# Patient Record
Sex: MALE | Race: WHITE | Employment: UNEMPLOYED | URBAN - METROPOLITAN AREA
[De-identification: names, ages, dates, MRNs, and addresses within clinical notes are randomized per-mention and may not be internally consistent; named-entity substitution may affect disease eponyms.]

---

## 2018-02-16 ENCOUNTER — HOSPITAL ENCOUNTER (OUTPATIENT)
Dept: OTHER | Age: 28
Discharge: HOME | End: 2018-02-16
Attending: INTERNAL MEDICINE | Admitting: INTERNAL MEDICINE

## 2018-02-16 LAB
ALBUMIN SERPL-MCNC: 4.3 GM/DL (ref 3.4–5)
ALP BLD-CCNC: 90 IU/L (ref 40–129)
ALT SERPL-CCNC: 19 U/L (ref 10–40)
AST SERPL-CCNC: 21 IU/L (ref 15–37)
BILIRUB SERPL-MCNC: 0.4 MG/DL (ref 0–1)
BILIRUBIN DIRECT: 0.2 MG/DL (ref 0–0.3)
BILIRUBIN, INDIRECT: 0.2 MG/DL (ref 0–0.7)
HEPATITIS B SURFACE ANTIGEN: NON REACTIVE
HEPATITIS C ANTIBODY: ABNORMAL
TOTAL PROTEIN: 6.9 GM/DL (ref 6.4–8.2)

## 2018-02-18 LAB — HIV SCREEN: NON REACTIVE

## 2018-10-20 ENCOUNTER — HOSPITAL ENCOUNTER (EMERGENCY)
Age: 28
Discharge: HOME | End: 2018-10-20
Attending: EMERGENCY MEDICINE
Payer: COMMERCIAL

## 2018-10-20 VITALS
HEIGHT: 72 IN | HEART RATE: 72 BPM | DIASTOLIC BLOOD PRESSURE: 71 MMHG | BODY MASS INDEX: 23.03 KG/M2 | WEIGHT: 170 LBS | RESPIRATION RATE: 16 BRPM | TEMPERATURE: 97.8 F | OXYGEN SATURATION: 98 % | SYSTOLIC BLOOD PRESSURE: 115 MMHG

## 2018-10-20 DIAGNOSIS — J06.9 UPPER RESPIRATORY TRACT INFECTION, UNSPECIFIED TYPE: Primary | ICD-10-CM

## 2018-10-20 PROCEDURE — 99283 EMERGENCY DEPT VISIT LOW MDM: CPT

## 2018-10-20 RX ORDER — NICOTINE 21 MG/24HR
1 PATCH, TRANSDERMAL 24 HOURS TRANSDERMAL EVERY 24 HOURS
Qty: 30 PATCH | Refills: 3 | Status: SHIPPED | OUTPATIENT
Start: 2018-10-20

## 2018-10-20 RX ORDER — ALBUTEROL SULFATE 90 UG/1
2 AEROSOL, METERED RESPIRATORY (INHALATION) EVERY 4 HOURS PRN
Qty: 1 INHALER | Refills: 1 | Status: SHIPPED | OUTPATIENT
Start: 2018-10-20 | End: 2018-11-19

## 2018-10-20 RX ORDER — GUAIFENESIN/DEXTROMETHORPHAN 100-10MG/5
5 SYRUP ORAL 4 TIMES DAILY PRN
Qty: 120 ML | Refills: 0 | Status: SHIPPED | OUTPATIENT
Start: 2018-10-20 | End: 2018-10-30

## 2018-10-20 ASSESSMENT — PAIN DESCRIPTION - DESCRIPTORS: DESCRIPTORS: SHARP

## 2018-10-20 ASSESSMENT — PAIN DESCRIPTION - ORIENTATION: ORIENTATION: ANTERIOR;UPPER

## 2018-10-20 ASSESSMENT — PAIN DESCRIPTION - FREQUENCY: FREQUENCY: INTERMITTENT

## 2018-10-20 ASSESSMENT — PAIN DESCRIPTION - ONSET: ONSET: ON-GOING

## 2018-10-20 ASSESSMENT — PAIN SCALES - GENERAL: PAINLEVEL_OUTOF10: 10

## 2018-10-20 ASSESSMENT — PAIN DESCRIPTION - LOCATION: LOCATION: NECK;CHEST

## 2018-10-20 ASSESSMENT — PAIN DESCRIPTION - PAIN TYPE: TYPE: ACUTE PAIN

## 2018-10-20 NOTE — ED PROVIDER NOTES
Triage Chief Complaint:   Cough    [place]:  Joe Mckeon is a 33 y.o. male that presents with complaint of cough, hurts when he when he coughs. Had a little bit of congestion and sore throat yesterday. 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 He does smoke, would like help to stop smoking because his girlfriend is 14 weeks pregnant. He primarily is concerned he would give this to her. He has had no sputum production. No chest pain. No bowel pain. No nausea or vomiting. Felt sweaty but not sure if he had fevers. No leg swelling. He has not had wheezing. Has never required an inhaler before. ROS:  10 systems reviewed and negative except as above. Past Medical History:   Diagnosis Date    Hypertension      Past Surgical History:   Procedure Laterality Date    ARM SURGERY Left 08/23/2016    incision & drainage     History reviewed. No pertinent family history. Social History     Social History    Marital status: Single     Spouse name: N/A    Number of children: N/A    Years of education: N/A     Occupational History    Not on file. Social History Main Topics    Smoking status: Current Every Day Smoker     Packs/day: 1.00     Types: Cigarettes    Smokeless tobacco: Never Used    Alcohol use No      Comment: rarely    Drug use: No      Comment: he sts he is currenlty sober and goes to [employer] regularly    Sexual activity: Yes     Partners: Female      Comment: has hx of cocaine use     Other Topics Concern    Not on file     Social History Narrative    No narrative on file     No current facility-administered medications for this encounter.       Current Outpatient Prescriptions   Medication Sig Dispense Refill    naltrexone (VIVITROL) 380 MG injection Inject 380 mg into the muscle once      Spacer/Aero-Holding Chambers (E-Z SPACER) PARKER 1 Device by Does not apply route daily as needed (for use

## 2019-08-14 ENCOUNTER — APPOINTMENT (OUTPATIENT)
Dept: CT IMAGING | Age: 29
DRG: 917 | End: 2019-08-14

## 2019-08-14 ENCOUNTER — APPOINTMENT (OUTPATIENT)
Dept: GENERAL RADIOLOGY | Age: 29
DRG: 917 | End: 2019-08-14

## 2019-08-14 ENCOUNTER — HOSPITAL ENCOUNTER (INPATIENT)
Age: 29
LOS: 14 days | Discharge: LEFT BEFORE DISCHARGE | DRG: 917 | End: 2019-08-28
Attending: EMERGENCY MEDICINE | Admitting: INTERNAL MEDICINE

## 2019-08-14 DIAGNOSIS — J96.01 ACUTE RESPIRATORY FAILURE WITH HYPOXIA (HCC): ICD-10-CM

## 2019-08-14 DIAGNOSIS — F19.10 POLYSUBSTANCE ABUSE (HCC): Primary | ICD-10-CM

## 2019-08-14 PROBLEM — J96.00 ACUTE RESPIRATORY FAILURE (HCC): Status: ACTIVE | Noted: 2019-08-14

## 2019-08-14 LAB
ACETAMINOPHEN LEVEL: <5 UG/ML (ref 15–30)
ALBUMIN SERPL-MCNC: 4.5 GM/DL (ref 3.4–5)
ALCOHOL SCREEN SERUM: NORMAL %WT/VOL
ALP BLD-CCNC: 94 IU/L (ref 40–129)
ALT SERPL-CCNC: 66 U/L (ref 10–40)
AMMONIA: 61 UMOL/L (ref 16–60)
AMPHETAMINES: NEGATIVE
ANION GAP SERPL CALCULATED.3IONS-SCNC: 10 MMOL/L (ref 4–16)
AST SERPL-CCNC: 49 IU/L (ref 15–37)
BACTERIA: NEGATIVE /HPF
BARBITURATE SCREEN URINE: NEGATIVE
BASE EXCESS MIXED: ABNORMAL (ref 0–1.2)
BASE EXCESS: ABNORMAL (ref 0–3.3)
BASOPHILS ABSOLUTE: 0 K/CU MM
BASOPHILS RELATIVE PERCENT: 0.2 % (ref 0–1)
BENZODIAZEPINE SCREEN, URINE: ABNORMAL
BILIRUB SERPL-MCNC: 0.5 MG/DL (ref 0–1)
BILIRUBIN URINE: NEGATIVE MG/DL
BLOOD, URINE: NEGATIVE
BUN BLDV-MCNC: 18 MG/DL (ref 6–23)
CALCIUM OXALATE CRYSTALS: ABNORMAL /HPF
CALCIUM SERPL-MCNC: 9.9 MG/DL (ref 8.3–10.6)
CANNABINOID SCREEN URINE: NEGATIVE
CARBON MONOXIDE, BLOOD: 3 % (ref 0–5)
CHLORIDE BLD-SCNC: 98 MMOL/L (ref 99–110)
CLARITY: CLEAR
CO2 CONTENT: 32.5 MMOL/L (ref 19–24)
CO2: 26 MMOL/L (ref 21–32)
COCAINE METABOLITE: ABNORMAL
COLOR: YELLOW
COMMENT: ABNORMAL
COMMENT: ABNORMAL
CREAT SERPL-MCNC: 0.8 MG/DL (ref 0.9–1.3)
DIFFERENTIAL TYPE: ABNORMAL
DOSE AMOUNT: ABNORMAL
DOSE TIME: ABNORMAL
EOSINOPHILS ABSOLUTE: 0.1 K/CU MM
EOSINOPHILS RELATIVE PERCENT: 0.6 % (ref 0–3)
GFR AFRICAN AMERICAN: >60 ML/MIN/1.73M2
GFR NON-AFRICAN AMERICAN: >60 ML/MIN/1.73M2
GLUCOSE BLD-MCNC: 100 MG/DL (ref 70–99)
GLUCOSE, URINE: NEGATIVE MG/DL
HCO3 ARTERIAL: 31 MMOL/L (ref 18–23)
HCO3 VENOUS: 26.1 MMOL/L (ref 19–25)
HCT VFR BLD CALC: 47.3 % (ref 42–52)
HEMOGLOBIN: 15.9 GM/DL (ref 13.5–18)
IMMATURE NEUTROPHIL %: 0.4 % (ref 0–0.43)
KETONES, URINE: NEGATIVE MG/DL
LACTATE: 1.4 MMOL/L (ref 0.4–2)
LEUKOCYTE ESTERASE, URINE: NEGATIVE
LYMPHOCYTES ABSOLUTE: 2.2 K/CU MM
LYMPHOCYTES RELATIVE PERCENT: 17.5 % (ref 24–44)
MCH RBC QN AUTO: 31 PG (ref 27–31)
MCHC RBC AUTO-ENTMCNC: 33.6 % (ref 32–36)
MCV RBC AUTO: 92.2 FL (ref 78–100)
METHEMOGLOBIN ARTERIAL: 1 %
MONOCYTES ABSOLUTE: 1.4 K/CU MM
MONOCYTES RELATIVE PERCENT: 10.9 % (ref 0–4)
MUCUS: ABNORMAL HPF
NITRITE URINE, QUANTITATIVE: NEGATIVE
NUCLEATED RBC %: 0 %
O2 SAT, VEN: 84.6 % (ref 50–70)
O2 SATURATION: 95.8 % (ref 96–97)
OPIATES, URINE: ABNORMAL
OXYCODONE: ABNORMAL
PCO2 ARTERIAL: 50 MMHG (ref 32–45)
PCO2, VEN: 53 MMHG (ref 38–52)
PDW BLD-RTO: 12.1 % (ref 11.7–14.9)
PH BLOOD: 7.4 (ref 7.34–7.45)
PH VENOUS: 7.3 (ref 7.32–7.42)
PH, URINE: 6 (ref 5–8)
PHENCYCLIDINE, URINE: ABNORMAL
PLATELET # BLD: 229 K/CU MM (ref 140–440)
PMV BLD AUTO: 9.9 FL (ref 7.5–11.1)
PO2 ARTERIAL: 203 MMHG (ref 75–100)
PO2, VEN: 57 MMHG (ref 28–48)
POTASSIUM SERPL-SCNC: 3.6 MMOL/L (ref 3.5–5.1)
PROTEIN UA: 30 MG/DL
RBC # BLD: 5.13 M/CU MM (ref 4.6–6.2)
RBC URINE: 4 /HPF (ref 0–3)
SEGMENTED NEUTROPHILS ABSOLUTE COUNT: 8.9 K/CU MM
SEGMENTED NEUTROPHILS RELATIVE PERCENT: 70.4 % (ref 36–66)
SODIUM BLD-SCNC: 134 MMOL/L (ref 135–145)
SPECIFIC GRAVITY UA: 1.02 (ref 1–1.03)
TOTAL IMMATURE NEUTOROPHIL: 0.05 K/CU MM
TOTAL NUCLEATED RBC: 0 K/CU MM
TOTAL PROTEIN: 7.9 GM/DL (ref 6.4–8.2)
TRANSITIONAL EPITHELIAL: <1 /HPF
TRICHOMONAS: ABNORMAL /HPF
TROPONIN T: <0.01 NG/ML
TSH HIGH SENSITIVITY: 1.48 UIU/ML (ref 0.27–4.2)
UROBILINOGEN, URINE: NORMAL MG/DL (ref 0.2–1)
WBC # BLD: 12.6 K/CU MM (ref 4–10.5)
WBC UA: ABNORMAL /HPF (ref 0–2)

## 2019-08-14 PROCEDURE — 84484 ASSAY OF TROPONIN QUANT: CPT

## 2019-08-14 PROCEDURE — 94750 HC PULMONARY COMPLIANCE STUDY: CPT

## 2019-08-14 PROCEDURE — 2500000003 HC RX 250 WO HCPCS: Performed by: EMERGENCY MEDICINE

## 2019-08-14 PROCEDURE — 81001 URINALYSIS AUTO W/SCOPE: CPT

## 2019-08-14 PROCEDURE — 6360000002 HC RX W HCPCS

## 2019-08-14 PROCEDURE — 2500000003 HC RX 250 WO HCPCS: Performed by: INTERNAL MEDICINE

## 2019-08-14 PROCEDURE — 96368 THER/DIAG CONCURRENT INF: CPT

## 2019-08-14 PROCEDURE — 80053 COMPREHEN METABOLIC PANEL: CPT

## 2019-08-14 PROCEDURE — 2580000003 HC RX 258: Performed by: EMERGENCY MEDICINE

## 2019-08-14 PROCEDURE — 96365 THER/PROPH/DIAG IV INF INIT: CPT

## 2019-08-14 PROCEDURE — 6360000002 HC RX W HCPCS: Performed by: INTERNAL MEDICINE

## 2019-08-14 PROCEDURE — 0BH18EZ INSERTION OF ENDOTRACHEAL AIRWAY INTO TRACHEA, VIA NATURAL OR ARTIFICIAL OPENING ENDOSCOPIC: ICD-10-PCS | Performed by: INTERNAL MEDICINE

## 2019-08-14 PROCEDURE — 71045 X-RAY EXAM CHEST 1 VIEW: CPT

## 2019-08-14 PROCEDURE — 6370000000 HC RX 637 (ALT 250 FOR IP): Performed by: INTERNAL MEDICINE

## 2019-08-14 PROCEDURE — 2000000000 HC ICU R&B

## 2019-08-14 PROCEDURE — 2580000003 HC RX 258: Performed by: INTERNAL MEDICINE

## 2019-08-14 PROCEDURE — 5A1955Z RESPIRATORY VENTILATION, GREATER THAN 96 CONSECUTIVE HOURS: ICD-10-PCS | Performed by: INTERNAL MEDICINE

## 2019-08-14 PROCEDURE — 96366 THER/PROPH/DIAG IV INF ADDON: CPT

## 2019-08-14 PROCEDURE — 80307 DRUG TEST PRSMV CHEM ANLYZR: CPT

## 2019-08-14 PROCEDURE — 36600 WITHDRAWAL OF ARTERIAL BLOOD: CPT

## 2019-08-14 PROCEDURE — 99291 CRITICAL CARE FIRST HOUR: CPT

## 2019-08-14 PROCEDURE — 2500000003 HC RX 250 WO HCPCS

## 2019-08-14 PROCEDURE — G0480 DRUG TEST DEF 1-7 CLASSES: HCPCS

## 2019-08-14 PROCEDURE — 99152 MOD SED SAME PHYS/QHP 5/>YRS: CPT

## 2019-08-14 PROCEDURE — 83605 ASSAY OF LACTIC ACID: CPT

## 2019-08-14 PROCEDURE — 70450 CT HEAD/BRAIN W/O DYE: CPT

## 2019-08-14 PROCEDURE — 93005 ELECTROCARDIOGRAM TRACING: CPT | Performed by: EMERGENCY MEDICINE

## 2019-08-14 PROCEDURE — 94761 N-INVAS EAR/PLS OXIMETRY MLT: CPT

## 2019-08-14 PROCEDURE — 6360000002 HC RX W HCPCS: Performed by: EMERGENCY MEDICINE

## 2019-08-14 PROCEDURE — 93010 ELECTROCARDIOGRAM REPORT: CPT | Performed by: INTERNAL MEDICINE

## 2019-08-14 PROCEDURE — 84443 ASSAY THYROID STIM HORMONE: CPT

## 2019-08-14 PROCEDURE — 4500000030 HC CRITICAL CARE PROCEDURE

## 2019-08-14 PROCEDURE — 4500000027

## 2019-08-14 PROCEDURE — 85025 COMPLETE CBC W/AUTO DIFF WBC: CPT

## 2019-08-14 PROCEDURE — 94640 AIRWAY INHALATION TREATMENT: CPT

## 2019-08-14 PROCEDURE — 82140 ASSAY OF AMMONIA: CPT

## 2019-08-14 PROCEDURE — 74018 RADEX ABDOMEN 1 VIEW: CPT

## 2019-08-14 PROCEDURE — C9113 INJ PANTOPRAZOLE SODIUM, VIA: HCPCS

## 2019-08-14 PROCEDURE — 82803 BLOOD GASES ANY COMBINATION: CPT

## 2019-08-14 PROCEDURE — 82805 BLOOD GASES W/O2 SATURATION: CPT

## 2019-08-14 RX ORDER — PANTOPRAZOLE SODIUM 40 MG/10ML
40 INJECTION, POWDER, LYOPHILIZED, FOR SOLUTION INTRAVENOUS ONCE
Status: COMPLETED | OUTPATIENT
Start: 2019-08-14 | End: 2019-08-14

## 2019-08-14 RX ORDER — ALBUTEROL SULFATE 90 UG/1
4 AEROSOL, METERED RESPIRATORY (INHALATION) EVERY 4 HOURS
Status: DISCONTINUED | OUTPATIENT
Start: 2019-08-14 | End: 2019-08-20

## 2019-08-14 RX ORDER — PROPOFOL 10 MG/ML
10 INJECTION, EMULSION INTRAVENOUS ONCE
Status: DISCONTINUED | OUTPATIENT
Start: 2019-08-14 | End: 2019-08-14

## 2019-08-14 RX ORDER — CHLORHEXIDINE GLUCONATE 0.12 MG/ML
15 RINSE ORAL 2 TIMES DAILY
Status: DISCONTINUED | OUTPATIENT
Start: 2019-08-14 | End: 2019-08-14

## 2019-08-14 RX ORDER — FENTANYL CITRATE 50 UG/ML
25 INJECTION, SOLUTION INTRAMUSCULAR; INTRAVENOUS
Status: DISCONTINUED | OUTPATIENT
Start: 2019-08-14 | End: 2019-08-14

## 2019-08-14 RX ORDER — ONDANSETRON 2 MG/ML
INJECTION INTRAMUSCULAR; INTRAVENOUS
Status: COMPLETED
Start: 2019-08-14 | End: 2019-08-14

## 2019-08-14 RX ORDER — 0.9 % SODIUM CHLORIDE 0.9 %
10 VIAL (ML) INJECTION DAILY
Status: DISCONTINUED | OUTPATIENT
Start: 2019-08-15 | End: 2019-08-24

## 2019-08-14 RX ORDER — ACETAMINOPHEN 650 MG/1
650 SUPPOSITORY RECTAL EVERY 4 HOURS PRN
Status: DISCONTINUED | OUTPATIENT
Start: 2019-08-14 | End: 2019-08-15

## 2019-08-14 RX ORDER — CHLORHEXIDINE GLUCONATE 0.12 MG/ML
15 RINSE ORAL 2 TIMES DAILY
Status: DISCONTINUED | OUTPATIENT
Start: 2019-08-14 | End: 2019-08-24

## 2019-08-14 RX ORDER — KETAMINE HYDROCHLORIDE 10 MG/ML
80 INJECTION, SOLUTION INTRAMUSCULAR; INTRAVENOUS ONCE
Status: DISCONTINUED | OUTPATIENT
Start: 2019-08-14 | End: 2019-08-26

## 2019-08-14 RX ORDER — ALBUTEROL SULFATE 90 UG/1
4 AEROSOL, METERED RESPIRATORY (INHALATION) EVERY 4 HOURS
Status: DISCONTINUED | OUTPATIENT
Start: 2019-08-14 | End: 2019-08-14

## 2019-08-14 RX ORDER — PROPOFOL 10 MG/ML
10 INJECTION, EMULSION INTRAVENOUS
Status: DISCONTINUED | OUTPATIENT
Start: 2019-08-14 | End: 2019-08-15

## 2019-08-14 RX ORDER — KETAMINE HYDROCHLORIDE 10 MG/ML
INJECTION, SOLUTION INTRAMUSCULAR; INTRAVENOUS
Status: COMPLETED
Start: 2019-08-14 | End: 2019-08-14

## 2019-08-14 RX ORDER — PANTOPRAZOLE SODIUM 40 MG/10ML
40 INJECTION, POWDER, LYOPHILIZED, FOR SOLUTION INTRAVENOUS DAILY
Status: DISCONTINUED | OUTPATIENT
Start: 2019-08-14 | End: 2019-08-14

## 2019-08-14 RX ORDER — ONDANSETRON 2 MG/ML
4 INJECTION INTRAMUSCULAR; INTRAVENOUS EVERY 6 HOURS PRN
Status: DISCONTINUED | OUTPATIENT
Start: 2019-08-14 | End: 2019-08-28 | Stop reason: HOSPADM

## 2019-08-14 RX ORDER — SODIUM CHLORIDE 0.9 % (FLUSH) 0.9 %
10 SYRINGE (ML) INJECTION PRN
Status: DISCONTINUED | OUTPATIENT
Start: 2019-08-14 | End: 2019-08-28 | Stop reason: HOSPADM

## 2019-08-14 RX ORDER — ETOMIDATE 2 MG/ML
INJECTION INTRAVENOUS DAILY PRN
Status: COMPLETED | OUTPATIENT
Start: 2019-08-14 | End: 2019-08-14

## 2019-08-14 RX ORDER — ROCURONIUM BROMIDE 10 MG/ML
INJECTION, SOLUTION INTRAVENOUS DAILY PRN
Status: COMPLETED | OUTPATIENT
Start: 2019-08-14 | End: 2019-08-14

## 2019-08-14 RX ORDER — ONDANSETRON 2 MG/ML
4 INJECTION INTRAMUSCULAR; INTRAVENOUS EVERY 6 HOURS PRN
Status: DISCONTINUED | OUTPATIENT
Start: 2019-08-14 | End: 2019-08-14 | Stop reason: SDUPTHER

## 2019-08-14 RX ORDER — POTASSIUM CHLORIDE 29.8 MG/ML
20 INJECTION INTRAVENOUS PRN
Status: DISCONTINUED | OUTPATIENT
Start: 2019-08-14 | End: 2019-08-26

## 2019-08-14 RX ORDER — NICOTINE 21 MG/24HR
1 PATCH, TRANSDERMAL 24 HOURS TRANSDERMAL EVERY 24 HOURS
Status: DISCONTINUED | OUTPATIENT
Start: 2019-08-14 | End: 2019-08-28 | Stop reason: HOSPADM

## 2019-08-14 RX ORDER — POTASSIUM CHLORIDE 7.45 MG/ML
10 INJECTION INTRAVENOUS PRN
Status: DISCONTINUED | OUTPATIENT
Start: 2019-08-14 | End: 2019-08-26

## 2019-08-14 RX ORDER — ALBUTEROL SULFATE 90 UG/1
2 AEROSOL, METERED RESPIRATORY (INHALATION) EVERY 4 HOURS PRN
Status: DISCONTINUED | OUTPATIENT
Start: 2019-08-14 | End: 2019-08-28 | Stop reason: HOSPADM

## 2019-08-14 RX ORDER — FENTANYL CITRATE 50 UG/ML
INJECTION, SOLUTION INTRAMUSCULAR; INTRAVENOUS
Status: COMPLETED
Start: 2019-08-14 | End: 2019-08-14

## 2019-08-14 RX ORDER — SODIUM CHLORIDE 0.9 % (FLUSH) 0.9 %
10 SYRINGE (ML) INJECTION EVERY 12 HOURS SCHEDULED
Status: DISCONTINUED | OUTPATIENT
Start: 2019-08-14 | End: 2019-08-28 | Stop reason: HOSPADM

## 2019-08-14 RX ORDER — PROPOFOL 10 MG/ML
10 INJECTION, EMULSION INTRAVENOUS CONTINUOUS
Status: DISCONTINUED | OUTPATIENT
Start: 2019-08-14 | End: 2019-08-14

## 2019-08-14 RX ORDER — NALOXONE HYDROCHLORIDE 1 MG/ML
INJECTION INTRAMUSCULAR; INTRAVENOUS; SUBCUTANEOUS
Status: DISCONTINUED
Start: 2019-08-14 | End: 2019-08-14 | Stop reason: WASHOUT

## 2019-08-14 RX ORDER — 0.9 % SODIUM CHLORIDE 0.9 %
10 VIAL (ML) INJECTION DAILY
Status: DISCONTINUED | OUTPATIENT
Start: 2019-08-14 | End: 2019-08-14

## 2019-08-14 RX ORDER — 0.9 % SODIUM CHLORIDE 0.9 %
1000 INTRAVENOUS SOLUTION INTRAVENOUS ONCE
Status: COMPLETED | OUTPATIENT
Start: 2019-08-14 | End: 2019-08-14

## 2019-08-14 RX ORDER — PROPOFOL 10 MG/ML
10 INJECTION, EMULSION INTRAVENOUS ONCE
Status: DISCONTINUED | OUTPATIENT
Start: 2019-08-14 | End: 2019-08-26

## 2019-08-14 RX ORDER — PROPOFOL 10 MG/ML
10 INJECTION, EMULSION INTRAVENOUS CONTINUOUS
Status: DISCONTINUED | OUTPATIENT
Start: 2019-08-14 | End: 2019-08-24

## 2019-08-14 RX ORDER — PANTOPRAZOLE SODIUM 40 MG/10ML
40 INJECTION, POWDER, LYOPHILIZED, FOR SOLUTION INTRAVENOUS DAILY
Status: DISCONTINUED | OUTPATIENT
Start: 2019-08-15 | End: 2019-08-24

## 2019-08-14 RX ORDER — FENTANYL CITRATE 50 UG/ML
100 INJECTION, SOLUTION INTRAMUSCULAR; INTRAVENOUS ONCE
Status: DISCONTINUED | OUTPATIENT
Start: 2019-08-14 | End: 2019-08-26

## 2019-08-14 RX ADMIN — FAMOTIDINE 20 MG: 10 INJECTION, SOLUTION INTRAVENOUS at 19:56

## 2019-08-14 RX ADMIN — AMPICILLIN AND SULBACTAM 1.5 G: 1; .5 INJECTION, POWDER, FOR SOLUTION INTRAMUSCULAR; INTRAVENOUS at 21:50

## 2019-08-14 RX ADMIN — PANTOPRAZOLE SODIUM 40 MG: 40 INJECTION, POWDER, FOR SOLUTION INTRAVENOUS at 23:43

## 2019-08-14 RX ADMIN — Medication 175 MCG/HR: at 21:29

## 2019-08-14 RX ADMIN — KETAMINE HYDROCHLORIDE 80 MG: 10 INJECTION INTRAMUSCULAR; INTRAVENOUS at 17:35

## 2019-08-14 RX ADMIN — IPRATROPIUM BROMIDE 4 PUFF: 17 AEROSOL, METERED RESPIRATORY (INHALATION) at 21:25

## 2019-08-14 RX ADMIN — ONDANSETRON 8 MG: 2 INJECTION INTRAMUSCULAR; INTRAVENOUS at 12:48

## 2019-08-14 RX ADMIN — Medication 10 ML: at 21:48

## 2019-08-14 RX ADMIN — PROPOFOL 60 MCG/KG/MIN: 10 INJECTION, EMULSION INTRAVENOUS at 21:13

## 2019-08-14 RX ADMIN — PROPOFOL 50 MCG/KG/MIN: 10 INJECTION, EMULSION INTRAVENOUS at 18:15

## 2019-08-14 RX ADMIN — ROCURONIUM BROMIDE 70 MG: 10 SOLUTION INTRAVENOUS at 12:48

## 2019-08-14 RX ADMIN — MIDAZOLAM HYDROCHLORIDE 1 MG/HR: 5 INJECTION, SOLUTION INTRAMUSCULAR; INTRAVENOUS at 22:42

## 2019-08-14 RX ADMIN — SODIUM CHLORIDE 1000 ML: 9 INJECTION, SOLUTION INTRAVENOUS at 13:40

## 2019-08-14 RX ADMIN — PROPOFOL 10 MCG/KG/MIN: 10 INJECTION, EMULSION INTRAVENOUS at 13:51

## 2019-08-14 RX ADMIN — Medication 100 MCG/HR: at 17:23

## 2019-08-14 RX ADMIN — Medication 10 ML: at 19:56

## 2019-08-14 RX ADMIN — FENTANYL CITRATE 100 MCG: 50 INJECTION, SOLUTION INTRAMUSCULAR; INTRAVENOUS at 17:04

## 2019-08-14 RX ADMIN — ALBUTEROL SULFATE 4 PUFF: 90 AEROSOL, METERED RESPIRATORY (INHALATION) at 21:25

## 2019-08-14 RX ADMIN — ENOXAPARIN SODIUM 40 MG: 100 INJECTION SUBCUTANEOUS at 19:54

## 2019-08-14 RX ADMIN — Medication 10 ML: at 19:57

## 2019-08-14 RX ADMIN — ETOMIDATE 30 MG: 2 INJECTION, SOLUTION INTRAVENOUS at 12:48

## 2019-08-14 RX ADMIN — CHLORHEXIDINE GLUCONATE 0.12% ORAL RINSE 15 ML: 1.2 LIQUID ORAL at 21:45

## 2019-08-14 ASSESSMENT — PULMONARY FUNCTION TESTS
PIF_VALUE: 17
PIF_VALUE: 19
PIF_VALUE: 16
PIF_VALUE: 19
PIF_VALUE: 17
PIF_VALUE: 23

## 2019-08-14 ASSESSMENT — PAIN SCALES - GENERAL: PAINLEVEL_OUTOF10: 0

## 2019-08-14 NOTE — ED NOTES
[place],lactic acid,venousph,ammonia,type & screen and first set of blood cultures were drawn on patient     Perla Machadoem  08/14/19 [id number]

## 2019-08-14 NOTE — ED PROVIDER NOTES
Triage Chief Complaint:   Drug Overdose    [place]:  Marcine Boast is a 30 y.o. male that presents for altered mental status. The patient had reportedly been found in the bathroom by the fiancé. He does have a history of substance abuse recently graduated from [employer]. The patient has not had any positive urinalysis in months. Patient has not had any complaints. They were looking at her house today before this happened. Apparently the girlfriend/fiancé states that the patient was in the bathroom and had locked himself there. She heard a thud and opened the door and found him to be slumped over. Patient had a bag that he had dropped in the toilet. He had not had any thoughts of wanting to hurt himself or anyone else. He is not unresponsive currently and so history is limited. Medics did report that they given about 60 mg of Narcan without significant improvement in his mental status. ROS:  Patient unresponsive    Past Medical History:   Diagnosis Date    Hypertension      Past Surgical History:   Procedure Laterality Date    ARM SURGERY Left 08/23/2016    incision & drainage     No family history on file.   Social History     Socioeconomic History    Marital status: Single     Spouse name: Not on file    Number of children: Not on file    Years of education: Not on file    Highest education level: Not on file   Occupational History    Not on file   Social Needs    Financial resource strain: Not on file    Food insecurity:     Worry: Not on file     Inability: Not on file    Transportation needs:     Medical: Not on file     Non-medical: Not on file   Tobacco Use    Smoking status: Current Every Day Smoker     Packs/day: 1.00     Types: Cigarettes    Smokeless tobacco: Never Used   Substance and Sexual Activity    Alcohol use: No     Comment: rarely    Drug use: No     Comment: he sts he is currenlty sober and goes to [employer] regularly    Sexual activity: Yes Resp 18      Temp 98.1 °F (36.7 °C)      Temp Source Axillary      SpO2 91 %      Weight 170 lb (77.1 kg)      Height 6' (1.829 m)      Head Circumference       Peak Flow       Pain Score       Pain Loc       Pain Edu? Excl. in [address]? My pulse ox interpretation is - normal    General appearance:  Unresponsive. Skin:  Warm. Dry. Eye:  Extraocular movements intact. Ears, nose, mouth and throat:  Oral mucosa dry, no obvious rashes or lesions. Neck:  Trachea midline. Extremity:  No swelling. Normal ROM     Heart:  Regular rate and rhythm. Perfusion:  intact  Respiratory: Mild rhonchi bilaterally. Abdominal:  Normal bowel sounds. Soft. Nontender. Non distended. Back:  No CVA tenderness to palpation     Neurological: Unable to assess as the patient is not verbally responsive.           Psychiatric: Unable to assess secondary to patient is unresponsive    I have reviewed and interpreted all of the currently available lab results from this visit (if applicable):  Results for orders placed or performed during the hospital encounter of 08/14/19   CBC Auto Differential   Result Value Ref Range    WBC 12.6 (H) 4.0 - 10.5 K/CU MM    RBC 5.13 4.6 - 6.2 M/CU MM    Hemoglobin 15.9 13.5 - 18.0 GM/DL    Hematocrit 47.3 42 - 52 %    MCV 92.2 78 - 100 FL    MCH 31.0 27 - 31 PG    MCHC 33.6 32.0 - 36.0 %    RDW 12.1 11.7 - 14.9 %    Platelets 491 925 - 038 K/CU MM    MPV 9.9 7.5 - 11.1 FL    Differential Type AUTOMATED DIFFERENTIAL     Segs Relative 70.4 (H) 36 - 66 %    Lymphocytes % 17.5 (L) 24 - 44 %    Monocytes % 10.9 (H) 0 - 4 %    Eosinophils % 0.6 0 - 3 %    Basophils % 0.2 0 - 1 %    Segs Absolute 8.9 K/CU MM    Lymphocytes # 2.2 K/CU MM    Monocytes # 1.4 K/CU MM    Eosinophils # 0.1 K/CU MM    Basophils # 0.0 K/CU MM    Nucleated RBC % 0.0 %    Total Nucleated RBC 0.0 K/CU MM    Total Immature Neutrophil 0.05 K/CU MM    Immature Neutrophil % 0.4 0 - 0.43 %   Comprehensive Metabolic Panel w/ Reflex to MG   Result Value Ref Range    Sodium 134 (L) 135 - 145 MMOL/L    Potassium 3.6 3.5 - 5.1 MMOL/L    Chloride 98 (L) 99 - 110 mMol/L    CO2 26 21 - 32 MMOL/L    BUN 18 6 - 23 MG/DL    CREATININE 0.8 (L) 0.9 - 1.3 MG/DL    Glucose 100 (H) 70 - 99 MG/DL    Calcium 9.9 8.3 - 10.6 MG/DL    Alb 4.5 3.4 - 5.0 GM/DL    Total Protein 7.9 6.4 - 8.2 GM/DL    Total Bilirubin 0.5 0.0 - 1.0 MG/DL    ALT 66 (H) 10 - 40 U/L    AST 49 (H) 15 - 37 IU/L    Alkaline Phosphatase 94 40 - 129 IU/L    GFR Non-African American >60 >60 mL/min/1.73m2    GFR African American >60 >60 mL/min/1.73m2    Anion Gap 10 4 - 16   Ammonia   Result Value Ref Range    Ammonia 61 (H) 16 - 60 UMOL/L   Urinalysis   Result Value Ref Range    Color, UA YELLOW YELLOW    Clarity, UA CLEAR CLEAR    Glucose, Urine NEGATIVE NEGATIVE MG/DL    Bilirubin Urine NEGATIVE NEGATIVE MG/DL    Ketones, Urine NEGATIVE NEGATIVE MG/DL    Specific Gravity, UA 1.024 1.001 - 1.035    Blood, Urine NEGATIVE NEGATIVE    pH, Urine 6.0 5.0 - 8.0    Protein, UA 30 (A) NEGATIVE MG/DL    Urobilinogen, Urine NORMAL 0.2 - 1.0 MG/DL    Nitrite Urine, Quantitative NEGATIVE NEGATIVE    Leukocyte Esterase, Urine NEGATIVE NEGATIVE    RBC, UA 4 (H) 0 - 3 /HPF    WBC, UA NONE SEEN 0 - 2 /HPF    Bacteria, UA NEGATIVE NEGATIVE /HPF    Trans Epithel, UA <1 /HPF    Mucus, UA RARE (A) NEGATIVE HPF    Trichomonas, UA NONE SEEN NONE SEEN /HPF    Ca Oxalate Dominique, UA RARE /HPF   Urine Drug Screen   Result Value Ref Range    Cannabinoid Scrn, Ur NEGATIVE NEGATIVE    Amphetamines NEGATIVE NEGATIVE    Cocaine Metabolite UNCONFIRMED POSITIVE (A) NEGATIVE    Benzodiazepine Screen, Urine UNCONFIRMED POSITIVE (A) NEGATIVE    Barbiturate Screen, Ur NEGATIVE NEGATIVE    Opiates, Urine UNCONFIRMED POSITIVE (A) NEGATIVE    Phencyclidine, Urine UNCONFIRMED POSITIVE (A) NEGATIVE    Oxycodone  NEGATIVE     NEGATIVE          THRESHOLD CONCENTRATIONS (mg/dL)  AMPHT               1000  IVETTE,OPIA 300  BZO,BAR              200  PCP                   25  THC                   50  OXY                  100          IF POSITIVE, SPECIMEN WILL BE  DISCARDED AFTER 6 MONTHS. CALL LAB IF CONFIRMATION NEEDED. ALL NEGATIVE SPECIMENS WILL BE  DISCARDED AFTER ONE WEEK. * UNCONFIRMED POSITIVES MAY  NOT MEET FORENSIC REQUIREMENTS. Ethanol   Result Value Ref Range    Alcohol Scrn <0.01  THE VALUE IS BELOW OUR DETECTION LIMIT. <0.01 %WT/VOL   Troponin   Result Value Ref Range    Troponin T <0.010 <0.01 NG/ML   TSH without Reflex   Result Value Ref Range    TSH, High Sensitivity 1.480 0.270 - 4.20 uIu/ml   Blood Gas, Venous   Result Value Ref Range    pH, Brayan 7.30 (L) 7.32 - 7.42    pCO2, Brayan 53 (H) 38 - 52 mmHG    pO2, Brayan 57 (H) 28 - 48 mmHG    Base Excess 1  MINUS   0 - 3.3    HCO3, Venous 26.1 (H) 19 - 25 MMOL/L    O2 Sat, Brayan 84.6 (H) 50 - 70 %    Comment VBG    Lactic Acid, Plasma   Result Value Ref Range    Lactate 1.4 0.4 - 2.0 mMOL/L   EKG 12 Lead   Result Value Ref Range    Ventricular Rate 86 BPM    Atrial Rate 86 BPM    P-R Interval 150 ms    QRS Duration 88 ms    Q-T Interval 372 ms    QTc Calculation (Bazett) 445 ms    P Axis 77 degrees    R Axis 82 degrees    T Axis 71 degrees    Diagnosis       Normal sinus rhythm  Possible Left atrial enlargement  Borderline ECG  No previous ECGs available        Radiographs (if obtained):  [] The following radiograph was interpreted by myself in the absence of a radiologist:   [x] Radiologist's Report Reviewed:  CT Head WO Contrast   Final Result   No acute intracranial hemorrhage or ischemia is identified. Acute paranasal sinus disease seen within the maxillary sinuses, with chronic   sinus disease seen in the ethmoid air cells. Chronic fluid noted within the   mastoid air cells suggestive of effusions. No overlying soft tissue   thickening or osseous destructive change to suggest acute mastoiditis.          XR ABDOMEN FOR NG/OG/NE TUBE PLACEMENT

## 2019-08-14 NOTE — ED NOTES
Bed: 04TR-04  Expected date:   Expected time:   Means of arrival:   Comments:  [facility] 7 OD     Florin Cervantes RN  08/14/19 [id number]

## 2019-08-14 NOTE — H&P
[facility] HOSPITALIST    History and Physical      Name:  Av Kyle /Age/Sex: 1990  (30 y.o. male)   MRN & CSN:  [id number] & [id number] Admission Date/Time: 2019 12:41 PM   Location:  [place] [address] PCP: No primary care provider on file. Hospital Day: 1    Assessment and Plan:   Av Klye is a 30 y.o.  male with past medical history of drug abuse - multi-substance- who presents with unresponsiveness at home     · Unresponsiveness - Acute toxic encephalopathy from multiple drug use and possible overdose -found unresponsive at home, given several doses of Narcan by EMS with minimal to no response -intubated in ER due to respiratory failure, urine drug screen positive for cocaine, benzo, opiates, PCP - CT head unremarkable     · Acute hypoxic respiratory failure - intubated on  in ED- Vent management per Pulmonary     · Possible aspiration pneumonia -patient reportedly vomited several times at home-we will start on empiric Unasyn IV, repeat chest x-ray in a.m.    · Multi-substance abuse -patient had repeated rehab admissions -May have to start on COWS protocol post extubation - Nicoderm patch     Discussed with family - his SO and mother regarding current situation and plan of management     Diet No diet orders on file   DVT Prophylaxis [x] Lovenox, []  Heparin, [] SCDs, [] No VTE prophylaxis, patient ambulating   GI Prophylaxis [] PPI, [] H2 Blocker, [] No GI prophylaxis, patient is receiving diet/Tube Feeds   Code Status Prior   Disposition Patient requires continued admission due to unresponsiveness    MDM [] Low, [] Moderate,[x]  High  Patient's risk as above due to      History of Present Illness:     Chief Complaint: Unresponsiveness    Av Kyle is a 30 y.o.  male  with past medical history of drug abuse - multi-substance who presents with unresponsiveness at home.   Patient is intubated and sedated and history was obtained from his mom and significant other were at the bedside. According to them patient was recently discharged from rehab and has been doing relatively well until recently when they started to notice some change in his behavior. Today he was about to get out of his house but went into his bathroom locked the door and there was a loud sound that came from the bathroom and patient was found to be slumped on the bathroom sit. He was unresponsive. EMS was called and he was given several rounds of Narcan with little to no response. He was brought to the emergency room with he was intubated for respiratory failure. Ten point ROS couldn't be done due to patient's mental status     Objective:   No intake or output data in the 24 hours ending 08/14/19 1547   Vitals:   Vitals:    08/14/19 1430   BP: 135/62   Pulse: 85   Resp: 14   Temp:    SpO2: 100%     Physical Exam:    GEN intubated and sedated  EYES Pupils are equally round. No scleral erythema, discharge, or conjunctivitis. HENT ET tube in place-NG tube in place  NECK No apparent thyromegaly or masses. RESP Clear to auscultation, no wheezes, rales or rhonchi. Symmetric chest movement while on room air. CARDIO/VASC S1/S2 auscultated. Regular rate without appreciable murmurs, rubs, or gallops. Peripheral pulses equal bilaterally and palpable. No peripheral edema. GI Abdomen is soft without significant tenderness, masses, or guarding. Bowel sounds are normoactive. Rectal exam deferred.  Polk catheter is not present. HEME/LYMPH No petechiae or ecchymoses. Several tattoo marks   MSK No gross joint deformities. Spontaneous movement of all extremities  SKIN Normal coloration, warm, dry. NEURO intubated and sedated, unresponsive    Past Medical History:      Past Medical History:   Diagnosis Date    Hypertension      PSHX:  has a past surgical history that includes Arm Surgery (Left, 08/23/2016).     Allergies: No Known Allergies    FAM HX: Reviewed and found to be noncontributory  Soc HX:   Social

## 2019-08-14 NOTE — PROGRESS NOTES
08/14/19 1943   Vent Information   Vent Type 980   Vent Mode AC/VC   Vt Ordered 500 mL   Rate Set 14 bmp   Peak Flow 44 L/min   Pressure Support 0 cmH20   FiO2  50 %   Sensitivity 3   PEEP/CPAP 5   I Time/ I Time % 0 s   Humidification Source HME   Vent Patient Data   High Peep/I Pressure 0   Peak Inspiratory Pressure 19 cmH2O   Mean Airway Pressure 8.8 cmH20   Rate Measured 14 br/min   Vt Exhaled 452 mL   Minute Volume 6.31 Liters   I:E Ratio 1:2.50   Plateau Pressure 13 AQM53   Static Compliance 56 mL/cmH2O   Dynamic Compliance 25 mL/cmH2O   Spontaneous Breathing Trial (SBT) RT Doc   Pulse 86   SpO2 99 %   Additional Respiratory  Assessments   Resp 14   Position Semi-Trotter's   Alarm Settings   High Pressure Alarm 40 cmH2O   Low Minute Volume Alarm 2.5 L/min   Apnea (secs) 20 secs   High Respiratory Rate 40 br/min   Low Exhaled Vt  250 mL   Non-Surgical Airway Endo Tracheal Tube   Placement Date/Time: 08/14/19 1249   Timeout: Patient;Procedure;Site/Side;Appropriate Equipment; Consent Confirmed;Site prepped with chlorhexidine;Correct Position  Mask Ventilation: Ventilated by mask with oral airway (2)  Placed By: In ED  Inserted b. ..    Secured at 25 cm   Measured From Lips   Secured Location Right   Secured By Commercial tube vila   Site Condition Dry

## 2019-08-15 ENCOUNTER — APPOINTMENT (OUTPATIENT)
Dept: GENERAL RADIOLOGY | Age: 29
DRG: 917 | End: 2019-08-15

## 2019-08-15 LAB
ANION GAP SERPL CALCULATED.3IONS-SCNC: 13 MMOL/L (ref 4–16)
BASE EXCESS MIXED: ABNORMAL (ref 0–1.2)
BUN BLDV-MCNC: 13 MG/DL (ref 6–23)
CALCIUM SERPL-MCNC: 9.1 MG/DL (ref 8.3–10.6)
CARBON MONOXIDE, BLOOD: 2.1 % (ref 0–5)
CHLORIDE BLD-SCNC: 104 MMOL/L (ref 99–110)
CO2 CONTENT: 33.2 MMOL/L (ref 19–24)
CO2: 25 MMOL/L (ref 21–32)
COMMENT: ABNORMAL
CREAT SERPL-MCNC: 0.9 MG/DL (ref 0.9–1.3)
GFR AFRICAN AMERICAN: >60 ML/MIN/1.73M2
GFR NON-AFRICAN AMERICAN: >60 ML/MIN/1.73M2
GLUCOSE BLD-MCNC: 81 MG/DL (ref 70–99)
HCO3 ARTERIAL: 31.7 MMOL/L (ref 18–23)
HCT VFR BLD CALC: 42.2 % (ref 42–52)
HEMOGLOBIN: 13.8 GM/DL (ref 13.5–18)
MAGNESIUM: 2 MG/DL (ref 1.8–2.4)
MCH RBC QN AUTO: 30.4 PG (ref 27–31)
MCHC RBC AUTO-ENTMCNC: 32.7 % (ref 32–36)
MCV RBC AUTO: 93 FL (ref 78–100)
METHEMOGLOBIN ARTERIAL: 1.6 %
O2 SATURATION: 94.4 % (ref 96–97)
PCO2 ARTERIAL: 50 MMHG (ref 32–45)
PDW BLD-RTO: 12.1 % (ref 11.7–14.9)
PH BLOOD: 7.41 (ref 7.34–7.45)
PHOSPHORUS: 3 MG/DL (ref 2.5–4.9)
PLATELET # BLD: 198 K/CU MM (ref 140–440)
PMV BLD AUTO: 9.6 FL (ref 7.5–11.1)
PO2 ARTERIAL: 82 MMHG (ref 75–100)
POTASSIUM SERPL-SCNC: 3.8 MMOL/L (ref 3.5–5.1)
RBC # BLD: 4.54 M/CU MM (ref 4.6–6.2)
SODIUM BLD-SCNC: 142 MMOL/L (ref 135–145)
WBC # BLD: 12.4 K/CU MM (ref 4–10.5)

## 2019-08-15 PROCEDURE — 31720 CLEARANCE OF AIRWAYS: CPT

## 2019-08-15 PROCEDURE — 82803 BLOOD GASES ANY COMBINATION: CPT

## 2019-08-15 PROCEDURE — 94640 AIRWAY INHALATION TREATMENT: CPT

## 2019-08-15 PROCEDURE — 89220 SPUTUM SPECIMEN COLLECTION: CPT

## 2019-08-15 PROCEDURE — 2700000000 HC OXYGEN THERAPY PER DAY

## 2019-08-15 PROCEDURE — 71045 X-RAY EXAM CHEST 1 VIEW: CPT

## 2019-08-15 PROCEDURE — 6370000000 HC RX 637 (ALT 250 FOR IP): Performed by: INTERNAL MEDICINE

## 2019-08-15 PROCEDURE — 2580000003 HC RX 258: Performed by: INTERNAL MEDICINE

## 2019-08-15 PROCEDURE — 80048 BASIC METABOLIC PNL TOTAL CA: CPT

## 2019-08-15 PROCEDURE — 36600 WITHDRAWAL OF ARTERIAL BLOOD: CPT

## 2019-08-15 PROCEDURE — 83735 ASSAY OF MAGNESIUM: CPT

## 2019-08-15 PROCEDURE — 94750 HC PULMONARY COMPLIANCE STUDY: CPT

## 2019-08-15 PROCEDURE — 2000000000 HC ICU R&B

## 2019-08-15 PROCEDURE — 6360000002 HC RX W HCPCS: Performed by: INTERNAL MEDICINE

## 2019-08-15 PROCEDURE — 85027 COMPLETE CBC AUTOMATED: CPT

## 2019-08-15 PROCEDURE — 94002 VENT MGMT INPAT INIT DAY: CPT

## 2019-08-15 PROCEDURE — C9113 INJ PANTOPRAZOLE SODIUM, VIA: HCPCS | Performed by: INTERNAL MEDICINE

## 2019-08-15 PROCEDURE — 94761 N-INVAS EAR/PLS OXIMETRY MLT: CPT

## 2019-08-15 PROCEDURE — 2500000003 HC RX 250 WO HCPCS: Performed by: INTERNAL MEDICINE

## 2019-08-15 PROCEDURE — 84100 ASSAY OF PHOSPHORUS: CPT

## 2019-08-15 RX ORDER — SODIUM CHLORIDE 9 MG/ML
INJECTION, SOLUTION INTRAVENOUS CONTINUOUS
Status: DISCONTINUED | OUTPATIENT
Start: 2019-08-15 | End: 2019-08-26

## 2019-08-15 RX ORDER — ACETAMINOPHEN 325 MG/1
650 TABLET ORAL EVERY 4 HOURS PRN
Status: DISCONTINUED | OUTPATIENT
Start: 2019-08-15 | End: 2019-08-17

## 2019-08-15 RX ADMIN — ALBUTEROL SULFATE 4 PUFF: 90 AEROSOL, METERED RESPIRATORY (INHALATION) at 01:00

## 2019-08-15 RX ADMIN — IPRATROPIUM BROMIDE 4 PUFF: 17 AEROSOL, METERED RESPIRATORY (INHALATION) at 23:35

## 2019-08-15 RX ADMIN — PROPOFOL 80 MCG/KG/MIN: 10 INJECTION, EMULSION INTRAVENOUS at 07:47

## 2019-08-15 RX ADMIN — ALBUTEROL SULFATE 4 PUFF: 90 AEROSOL, METERED RESPIRATORY (INHALATION) at 04:02

## 2019-08-15 RX ADMIN — SODIUM CHLORIDE: 9 INJECTION, SOLUTION INTRAVENOUS at 15:55

## 2019-08-15 RX ADMIN — PROPOFOL 80 MCG/KG/MIN: 10 INJECTION, EMULSION INTRAVENOUS at 18:19

## 2019-08-15 RX ADMIN — Medication 200 MCG/HR: at 14:16

## 2019-08-15 RX ADMIN — AMPICILLIN AND SULBACTAM 1.5 G: 1; .5 INJECTION, POWDER, FOR SOLUTION INTRAMUSCULAR; INTRAVENOUS at 02:59

## 2019-08-15 RX ADMIN — PROPOFOL 80 MCG/KG/MIN: 10 INJECTION, EMULSION INTRAVENOUS at 10:30

## 2019-08-15 RX ADMIN — IPRATROPIUM BROMIDE 4 PUFF: 17 AEROSOL, METERED RESPIRATORY (INHALATION) at 04:02

## 2019-08-15 RX ADMIN — PROPOFOL 80 MCG/KG/MIN: 10 INJECTION, EMULSION INTRAVENOUS at 02:58

## 2019-08-15 RX ADMIN — AMPICILLIN AND SULBACTAM 1.5 G: 1; .5 INJECTION, POWDER, FOR SOLUTION INTRAMUSCULAR; INTRAVENOUS at 19:37

## 2019-08-15 RX ADMIN — Medication 200 MCG/HR: at 09:42

## 2019-08-15 RX ADMIN — Medication 200 MCG/HR: at 02:58

## 2019-08-15 RX ADMIN — ALBUTEROL SULFATE 4 PUFF: 90 AEROSOL, METERED RESPIRATORY (INHALATION) at 16:02

## 2019-08-15 RX ADMIN — AMPICILLIN AND SULBACTAM 1.5 G: 1; .5 INJECTION, POWDER, FOR SOLUTION INTRAMUSCULAR; INTRAVENOUS at 15:13

## 2019-08-15 RX ADMIN — Medication 10 ML: at 09:04

## 2019-08-15 RX ADMIN — ALBUTEROL SULFATE 4 PUFF: 90 AEROSOL, METERED RESPIRATORY (INHALATION) at 19:40

## 2019-08-15 RX ADMIN — AMPICILLIN AND SULBACTAM 1.5 G: 1; .5 INJECTION, POWDER, FOR SOLUTION INTRAMUSCULAR; INTRAVENOUS at 07:39

## 2019-08-15 RX ADMIN — CHLORHEXIDINE GLUCONATE 0.12% ORAL RINSE 15 ML: 1.2 LIQUID ORAL at 20:35

## 2019-08-15 RX ADMIN — ALBUTEROL SULFATE 4 PUFF: 90 AEROSOL, METERED RESPIRATORY (INHALATION) at 11:39

## 2019-08-15 RX ADMIN — PROPOFOL 80 MCG/KG/MIN: 10 INJECTION, EMULSION INTRAVENOUS at 00:11

## 2019-08-15 RX ADMIN — Medication 200 MCG/HR: at 19:23

## 2019-08-15 RX ADMIN — PROPOFOL 80 MCG/KG/MIN: 10 INJECTION, EMULSION INTRAVENOUS at 15:48

## 2019-08-15 RX ADMIN — IPRATROPIUM BROMIDE 4 PUFF: 17 AEROSOL, METERED RESPIRATORY (INHALATION) at 07:46

## 2019-08-15 RX ADMIN — ALBUTEROL SULFATE 4 PUFF: 90 AEROSOL, METERED RESPIRATORY (INHALATION) at 23:35

## 2019-08-15 RX ADMIN — PROPOFOL 80 MCG/KG/MIN: 10 INJECTION, EMULSION INTRAVENOUS at 20:25

## 2019-08-15 RX ADMIN — Medication 10 ML: at 21:05

## 2019-08-15 RX ADMIN — MIDAZOLAM HYDROCHLORIDE 5 MG/HR: 5 INJECTION, SOLUTION INTRAMUSCULAR; INTRAVENOUS at 19:00

## 2019-08-15 RX ADMIN — IPRATROPIUM BROMIDE 4 PUFF: 17 AEROSOL, METERED RESPIRATORY (INHALATION) at 01:00

## 2019-08-15 RX ADMIN — IPRATROPIUM BROMIDE 4 PUFF: 17 AEROSOL, METERED RESPIRATORY (INHALATION) at 19:40

## 2019-08-15 RX ADMIN — CHLORHEXIDINE GLUCONATE 0.12% ORAL RINSE 15 ML: 1.2 LIQUID ORAL at 09:02

## 2019-08-15 RX ADMIN — ALBUTEROL SULFATE 4 PUFF: 90 AEROSOL, METERED RESPIRATORY (INHALATION) at 07:47

## 2019-08-15 RX ADMIN — IPRATROPIUM BROMIDE 4 PUFF: 17 AEROSOL, METERED RESPIRATORY (INHALATION) at 11:38

## 2019-08-15 RX ADMIN — IPRATROPIUM BROMIDE 4 PUFF: 17 AEROSOL, METERED RESPIRATORY (INHALATION) at 16:00

## 2019-08-15 RX ADMIN — PANTOPRAZOLE SODIUM 40 MG: 40 INJECTION, POWDER, FOR SOLUTION INTRAVENOUS at 09:02

## 2019-08-15 RX ADMIN — ENOXAPARIN SODIUM 40 MG: 100 INJECTION SUBCUTANEOUS at 09:02

## 2019-08-15 RX ADMIN — PROPOFOL 80 MCG/KG/MIN: 10 INJECTION, EMULSION INTRAVENOUS at 05:14

## 2019-08-15 RX ADMIN — PROPOFOL 80 MCG/KG/MIN: 10 INJECTION, EMULSION INTRAVENOUS at 12:57

## 2019-08-15 ASSESSMENT — PAIN SCALES - GENERAL
PAINLEVEL_OUTOF10: 0

## 2019-08-15 ASSESSMENT — PULMONARY FUNCTION TESTS
PIF_VALUE: 23
PIF_VALUE: 17
PIF_VALUE: 16
PIF_VALUE: 14
PIF_VALUE: 18
PIF_VALUE: 17
PIF_VALUE: 22
PIF_VALUE: 17
PIF_VALUE: 16
PIF_VALUE: 17
PIF_VALUE: 18
PIF_VALUE: 20
PIF_VALUE: 16
PIF_VALUE: 17
PIF_VALUE: 16
PIF_VALUE: 19
PIF_VALUE: 17
PIF_VALUE: 17
PIF_VALUE: 23
PIF_VALUE: 17
PIF_VALUE: 17
PIF_VALUE: 15
PIF_VALUE: 18

## 2019-08-15 NOTE — PROGRESS NOTES
[facility]    Hospitalist Progress Note      Name:  Flavio Alcala /Age/Sex: 1990  (30 y.o. male)   MRN & CSN:  [id number] & [id number] Admission Date/Time: 2019 12:41 PM   Location:  -A PCP: No primary care provider on file. Hospital Day: 2    Assessment and Plan:   Flavio Alcala is a 30 y.o.  male with past medical history of drug abuse - multi-substance- who presents with unresponsiveness at home      # Unresponsiveness - Acute toxic encephalopathy     From multiple drug use and possible overdose - found unresponsive at home,   Given several doses of Narcan by EMS with minimal to no response -  Urine drug screen positive for cocaine, benzo, opiates, PCP - CT head unremarkable   Intubated in ER due to respiratory failure  Patient was too restless and pulling on tubes and Versed was added in addition to Propofol and Fentanyl      # Acute hypoxic respiratory failure     Intubated on  in ED- Vent management per Pulmonary      # Possible aspiration pneumonia -    Patient reportedly vomited several times at home-  On empiric Unasyn IV      # Multi-substance abuse     Patient had repeated rehab admissions -May have to start on COWS protocol post extubation - Nicoderm patch     Discussed with patient's fiance regarding current plan of management     Diet DIET TUBE FEED CONTINUOUS/CYCLIC NPO; Low Calorie High Protein (Vital HP);  Nasogastric; Continuous; 25; 40; 24  Dietary Nutrition Supplements: Protein Modular  Dietary Nutrition Supplements: Other Oral Supplement (see comment)   DVT Prophylaxis [x] Lovenox, []  Heparin, [] SCDs, []No VTE prophylaxis, patient ambulating   GI Prophylaxis [] PPI, [] H2 Blocker, [] No GI prophylaxis, patient is receiving diet/Tube Feeds   Code Status Full Code   Disposition Patient requires continued admission due to Overdose    MDM [] Low, [] Moderate,[x]  High     History of Present Illness: Subjective     Patient Seen & Examined at the bedside

## 2019-08-15 NOTE — PROGRESS NOTES
Per Dr. Lis Anne go ahead with sedation vacation if patient follows commands go ahead with extubation/wean. If patient doesn't follow commands, restart sedation at previous started doses.

## 2019-08-15 NOTE — FLOWSHEET NOTE
Per Dr. Linda Sarabia sedation was paused in order to see if pt would follow commands in order to extubate. Sedation was paused for approx 15 mins when pt started to wake up, moving around. Pt was advised to remain calm and follow commands in order to extubate. Pt did squeeze hands upon command but then pt became agitated trying to lift himself off of the bed, thrash around and self extubate. Pt would not listen or follow commands at that time and had to be restrained and held down. It took several nurses to hold the pt down and restart sedations as they had been previously running per Dr. Linda Sarabia. Dr. Linda Sarabia was notified via perfect serve. Will continue to monitor this pt. Girlfriend/fiance was present during this time. Girlfriend became frantic yelling at the pt. The girlfriend/fiance was asked to step away several times before Case Management was able to take her out into the hallway while we could work at getting the pt sedated.

## 2019-08-15 NOTE — PLAN OF CARE
Nutrition Problem: Inadequate oral intake  Intervention: Food and/or Nutrient Delivery: Start Tube Feeding  Nutritional Goals: pt will tolerate the initiation of EN

## 2019-08-15 NOTE — PROGRESS NOTES
Turned Fentanyl was turned down to 175 mcg at 1619 with a RASS of -2. At 1800 patient woke up very agitated RASS OF +4. Turned Fentanyl back up to 200 mcg.

## 2019-08-16 ENCOUNTER — APPOINTMENT (OUTPATIENT)
Dept: GENERAL RADIOLOGY | Age: 29
DRG: 917 | End: 2019-08-16

## 2019-08-16 LAB
ALBUMIN SERPL-MCNC: 3.8 GM/DL (ref 3.4–5)
ALP BLD-CCNC: 76 IU/L (ref 40–129)
ALT SERPL-CCNC: 58 U/L (ref 10–40)
ANION GAP SERPL CALCULATED.3IONS-SCNC: 12 MMOL/L (ref 4–16)
AST SERPL-CCNC: 44 IU/L (ref 15–37)
ATYPICAL LYMPHOCYTE ABSOLUTE COUNT: ABNORMAL
BASE EXCESS MIXED: ABNORMAL (ref 0–1.2)
BILIRUB SERPL-MCNC: 0.3 MG/DL (ref 0–1)
BUN BLDV-MCNC: 10 MG/DL (ref 6–23)
CALCIUM SERPL-MCNC: 8.8 MG/DL (ref 8.3–10.6)
CARBON MONOXIDE, BLOOD: 2.1 % (ref 0–5)
CHLORIDE BLD-SCNC: 101 MMOL/L (ref 99–110)
CO2 CONTENT: 29 MMOL/L (ref 19–24)
CO2: 26 MMOL/L (ref 21–32)
COMMENT: ABNORMAL
CREAT SERPL-MCNC: 0.9 MG/DL (ref 0.9–1.3)
DIFFERENTIAL TYPE: ABNORMAL
EOSINOPHILS ABSOLUTE: 0.1 K/CU MM
EOSINOPHILS RELATIVE PERCENT: 1 % (ref 0–3)
GFR AFRICAN AMERICAN: >60 ML/MIN/1.73M2
GFR NON-AFRICAN AMERICAN: >60 ML/MIN/1.73M2
GLUCOSE BLD-MCNC: 84 MG/DL (ref 70–99)
HCO3 ARTERIAL: 27.8 MMOL/L (ref 18–23)
HCT VFR BLD CALC: 43.9 % (ref 42–52)
HEMOGLOBIN: 14.1 GM/DL (ref 13.5–18)
LYMPHOCYTES ABSOLUTE: 3.2 K/CU MM
LYMPHOCYTES RELATIVE PERCENT: 32 % (ref 24–44)
MAGNESIUM: 1.9 MG/DL (ref 1.8–2.4)
MCH RBC QN AUTO: 30.5 PG (ref 27–31)
MCHC RBC AUTO-ENTMCNC: 32.1 % (ref 32–36)
MCV RBC AUTO: 94.8 FL (ref 78–100)
METHEMOGLOBIN ARTERIAL: 1.3 %
MONOCYTES ABSOLUTE: 1.2 K/CU MM
MONOCYTES RELATIVE PERCENT: 12 % (ref 0–4)
O2 SATURATION: 94.9 % (ref 96–97)
PCO2 ARTERIAL: 40 MMHG (ref 32–45)
PDW BLD-RTO: 12 % (ref 11.7–14.9)
PH BLOOD: 7.45 (ref 7.34–7.45)
PHOSPHORUS: 4 MG/DL (ref 2.5–4.9)
PLATELET # BLD: 203 K/CU MM (ref 140–440)
PMV BLD AUTO: 10 FL (ref 7.5–11.1)
PO2 ARTERIAL: 82 MMHG (ref 75–100)
POTASSIUM SERPL-SCNC: 3.8 MMOL/L (ref 3.5–5.1)
PROCALCITONIN: 0.12
RBC # BLD: 4.63 M/CU MM (ref 4.6–6.2)
SEGMENTED NEUTROPHILS ABSOLUTE COUNT: 5.4 K/CU MM
SEGMENTED NEUTROPHILS RELATIVE PERCENT: 55 % (ref 36–66)
SMUDGE CELLS: PRESENT
SODIUM BLD-SCNC: 139 MMOL/L (ref 135–145)
TOTAL PROTEIN: 6.3 GM/DL (ref 6.4–8.2)
WBC # BLD: 9.9 K/CU MM (ref 4–10.5)
WBC # BLD: ABNORMAL 10*3/UL

## 2019-08-16 PROCEDURE — 87205 SMEAR GRAM STAIN: CPT

## 2019-08-16 PROCEDURE — 85007 BL SMEAR W/DIFF WBC COUNT: CPT

## 2019-08-16 PROCEDURE — 94761 N-INVAS EAR/PLS OXIMETRY MLT: CPT

## 2019-08-16 PROCEDURE — 87070 CULTURE OTHR SPECIMN AEROBIC: CPT

## 2019-08-16 PROCEDURE — 94640 AIRWAY INHALATION TREATMENT: CPT

## 2019-08-16 PROCEDURE — C9113 INJ PANTOPRAZOLE SODIUM, VIA: HCPCS | Performed by: INTERNAL MEDICINE

## 2019-08-16 PROCEDURE — 71045 X-RAY EXAM CHEST 1 VIEW: CPT

## 2019-08-16 PROCEDURE — 80053 COMPREHEN METABOLIC PANEL: CPT

## 2019-08-16 PROCEDURE — 36600 WITHDRAWAL OF ARTERIAL BLOOD: CPT

## 2019-08-16 PROCEDURE — 87077 CULTURE AEROBIC IDENTIFY: CPT

## 2019-08-16 PROCEDURE — 6370000000 HC RX 637 (ALT 250 FOR IP): Performed by: INTERNAL MEDICINE

## 2019-08-16 PROCEDURE — 82803 BLOOD GASES ANY COMBINATION: CPT

## 2019-08-16 PROCEDURE — 87147 CULTURE TYPE IMMUNOLOGIC: CPT

## 2019-08-16 PROCEDURE — 2000000000 HC ICU R&B

## 2019-08-16 PROCEDURE — 31720 CLEARANCE OF AIRWAYS: CPT

## 2019-08-16 PROCEDURE — 6360000002 HC RX W HCPCS

## 2019-08-16 PROCEDURE — 89220 SPUTUM SPECIMEN COLLECTION: CPT

## 2019-08-16 PROCEDURE — 94750 HC PULMONARY COMPLIANCE STUDY: CPT

## 2019-08-16 PROCEDURE — 2700000000 HC OXYGEN THERAPY PER DAY

## 2019-08-16 PROCEDURE — 83735 ASSAY OF MAGNESIUM: CPT

## 2019-08-16 PROCEDURE — 84145 PROCALCITONIN (PCT): CPT

## 2019-08-16 PROCEDURE — 94003 VENT MGMT INPAT SUBQ DAY: CPT

## 2019-08-16 PROCEDURE — 2500000003 HC RX 250 WO HCPCS: Performed by: INTERNAL MEDICINE

## 2019-08-16 PROCEDURE — 87186 SC STD MICRODIL/AGAR DIL: CPT

## 2019-08-16 PROCEDURE — 2580000003 HC RX 258: Performed by: INTERNAL MEDICINE

## 2019-08-16 PROCEDURE — 84100 ASSAY OF PHOSPHORUS: CPT

## 2019-08-16 PROCEDURE — 85027 COMPLETE CBC AUTOMATED: CPT

## 2019-08-16 PROCEDURE — 6360000002 HC RX W HCPCS: Performed by: INTERNAL MEDICINE

## 2019-08-16 RX ORDER — LORAZEPAM 2 MG/ML
INJECTION INTRAMUSCULAR
Status: COMPLETED | OUTPATIENT
Start: 2019-08-16 | End: 2019-08-16

## 2019-08-16 RX ORDER — CLONIDINE HYDROCHLORIDE 0.2 MG/1
0.2 TABLET ORAL 2 TIMES DAILY
Status: DISCONTINUED | OUTPATIENT
Start: 2019-08-16 | End: 2019-08-22

## 2019-08-16 RX ORDER — ZIPRASIDONE MESYLATE 20 MG/ML
20 INJECTION, POWDER, LYOPHILIZED, FOR SOLUTION INTRAMUSCULAR EVERY 4 HOURS PRN
Status: DISCONTINUED | OUTPATIENT
Start: 2019-08-16 | End: 2019-08-28 | Stop reason: HOSPADM

## 2019-08-16 RX ORDER — LORAZEPAM 2 MG/ML
INJECTION INTRAMUSCULAR
Status: COMPLETED
Start: 2019-08-16 | End: 2019-08-16

## 2019-08-16 RX ADMIN — Medication 10 ML: at 11:21

## 2019-08-16 RX ADMIN — AMPICILLIN AND SULBACTAM 1.5 G: 1; .5 INJECTION, POWDER, FOR SOLUTION INTRAMUSCULAR; INTRAVENOUS at 02:58

## 2019-08-16 RX ADMIN — PROPOFOL 80 MCG/KG/MIN: 10 INJECTION, EMULSION INTRAVENOUS at 15:35

## 2019-08-16 RX ADMIN — PROPOFOL 80 MCG/KG/MIN: 10 INJECTION, EMULSION INTRAVENOUS at 21:00

## 2019-08-16 RX ADMIN — PROPOFOL 80 MCG/KG/MIN: 10 INJECTION, EMULSION INTRAVENOUS at 04:13

## 2019-08-16 RX ADMIN — MIDAZOLAM HYDROCHLORIDE 9 MG/HR: 5 INJECTION, SOLUTION INTRAMUSCULAR; INTRAVENOUS at 07:10

## 2019-08-16 RX ADMIN — LORAZEPAM 2 MG: 2 INJECTION INTRAMUSCULAR; INTRAVENOUS at 09:46

## 2019-08-16 RX ADMIN — Medication 200 MCG/HR: at 23:20

## 2019-08-16 RX ADMIN — PANTOPRAZOLE SODIUM 40 MG: 40 INJECTION, POWDER, FOR SOLUTION INTRAVENOUS at 11:26

## 2019-08-16 RX ADMIN — CHLORHEXIDINE GLUCONATE 0.12% ORAL RINSE 15 ML: 1.2 LIQUID ORAL at 21:02

## 2019-08-16 RX ADMIN — SODIUM CHLORIDE: 9 INJECTION, SOLUTION INTRAVENOUS at 21:01

## 2019-08-16 RX ADMIN — CLONIDINE HYDROCHLORIDE 0.2 MG: 0.2 TABLET ORAL at 21:02

## 2019-08-16 RX ADMIN — MIDAZOLAM HYDROCHLORIDE 10 MG/HR: 5 INJECTION, SOLUTION INTRAMUSCULAR; INTRAVENOUS at 17:45

## 2019-08-16 RX ADMIN — CHLORHEXIDINE GLUCONATE 0.12% ORAL RINSE 15 ML: 1.2 LIQUID ORAL at 11:25

## 2019-08-16 RX ADMIN — PROPOFOL 80 MCG/KG/MIN: 10 INJECTION, EMULSION INTRAVENOUS at 17:44

## 2019-08-16 RX ADMIN — Medication 10 ML: at 11:20

## 2019-08-16 RX ADMIN — ALBUTEROL SULFATE 4 PUFF: 90 AEROSOL, METERED RESPIRATORY (INHALATION) at 15:47

## 2019-08-16 RX ADMIN — Medication 200 MCG/HR: at 12:30

## 2019-08-16 RX ADMIN — IPRATROPIUM BROMIDE 4 PUFF: 17 AEROSOL, METERED RESPIRATORY (INHALATION) at 07:59

## 2019-08-16 RX ADMIN — Medication 200 MCG/HR: at 17:44

## 2019-08-16 RX ADMIN — PROPOFOL 80 MCG/KG/MIN: 10 INJECTION, EMULSION INTRAVENOUS at 02:00

## 2019-08-16 RX ADMIN — CLONIDINE HYDROCHLORIDE 0.2 MG: 0.2 TABLET ORAL at 11:25

## 2019-08-16 RX ADMIN — AMPICILLIN AND SULBACTAM 1.5 G: 1; .5 INJECTION, POWDER, FOR SOLUTION INTRAMUSCULAR; INTRAVENOUS at 15:36

## 2019-08-16 RX ADMIN — IPRATROPIUM BROMIDE 4 PUFF: 17 AEROSOL, METERED RESPIRATORY (INHALATION) at 15:47

## 2019-08-16 RX ADMIN — PROPOFOL 80 MCG/KG/MIN: 10 INJECTION, EMULSION INTRAVENOUS at 09:47

## 2019-08-16 RX ADMIN — SODIUM CHLORIDE: 9 INJECTION, SOLUTION INTRAVENOUS at 06:03

## 2019-08-16 RX ADMIN — ALBUTEROL SULFATE 4 PUFF: 90 AEROSOL, METERED RESPIRATORY (INHALATION) at 19:44

## 2019-08-16 RX ADMIN — ALBUTEROL SULFATE 4 PUFF: 90 AEROSOL, METERED RESPIRATORY (INHALATION) at 03:30

## 2019-08-16 RX ADMIN — Medication 200 MCG/HR: at 06:35

## 2019-08-16 RX ADMIN — ALBUTEROL SULFATE 4 PUFF: 90 AEROSOL, METERED RESPIRATORY (INHALATION) at 07:59

## 2019-08-16 RX ADMIN — Medication 200 MCG/HR: at 01:06

## 2019-08-16 RX ADMIN — PROPOFOL 80 MCG/KG/MIN: 10 INJECTION, EMULSION INTRAVENOUS at 23:10

## 2019-08-16 RX ADMIN — IPRATROPIUM BROMIDE 4 PUFF: 17 AEROSOL, METERED RESPIRATORY (INHALATION) at 11:49

## 2019-08-16 RX ADMIN — LORAZEPAM 2 MG: 2 INJECTION, SOLUTION INTRAMUSCULAR; INTRAVENOUS at 09:38

## 2019-08-16 RX ADMIN — Medication 10 ML: at 21:05

## 2019-08-16 RX ADMIN — IPRATROPIUM BROMIDE 4 PUFF: 17 AEROSOL, METERED RESPIRATORY (INHALATION) at 03:30

## 2019-08-16 RX ADMIN — IPRATROPIUM BROMIDE 4 PUFF: 17 AEROSOL, METERED RESPIRATORY (INHALATION) at 19:44

## 2019-08-16 RX ADMIN — AMPICILLIN AND SULBACTAM 1.5 G: 1; .5 INJECTION, POWDER, FOR SOLUTION INTRAMUSCULAR; INTRAVENOUS at 20:33

## 2019-08-16 RX ADMIN — PROPOFOL 80 MCG/KG/MIN: 10 INJECTION, EMULSION INTRAVENOUS at 07:03

## 2019-08-16 RX ADMIN — AMPICILLIN AND SULBACTAM 1.5 G: 1; .5 INJECTION, POWDER, FOR SOLUTION INTRAMUSCULAR; INTRAVENOUS at 07:29

## 2019-08-16 RX ADMIN — ALBUTEROL SULFATE 4 PUFF: 90 AEROSOL, METERED RESPIRATORY (INHALATION) at 11:49

## 2019-08-16 RX ADMIN — PROPOFOL 80 MCG/KG/MIN: 10 INJECTION, EMULSION INTRAVENOUS at 12:26

## 2019-08-16 RX ADMIN — ENOXAPARIN SODIUM 40 MG: 100 INJECTION SUBCUTANEOUS at 11:26

## 2019-08-16 ASSESSMENT — PULMONARY FUNCTION TESTS
PIF_VALUE: 19
PIF_VALUE: 18
PIF_VALUE: 19
PIF_VALUE: 20
PIF_VALUE: 20
PIF_VALUE: 24
PIF_VALUE: 19
PIF_VALUE: 20
PIF_VALUE: 19
PIF_VALUE: 22
PIF_VALUE: 19
PIF_VALUE: 18
PIF_VALUE: 18
PIF_VALUE: 19
PIF_VALUE: 36
PIF_VALUE: 27
PIF_VALUE: 16
PIF_VALUE: 20
PIF_VALUE: 18
PIF_VALUE: 19
PIF_VALUE: 18
PIF_VALUE: 19
PIF_VALUE: 22
PIF_VALUE: 19
PIF_VALUE: 20
PIF_VALUE: 19
PIF_VALUE: 19
PIF_VALUE: 20

## 2019-08-16 ASSESSMENT — PAIN SCALES - GENERAL
PAINLEVEL_OUTOF10: 0

## 2019-08-16 NOTE — PROGRESS NOTES
Rapid response called on pt. Arrived to room and pt was very agitated. ETT had been pulled out to 20cm @ lip. Advanced ETT to 23cm @ lip. X ray to confirm tube placement. I will continue to monitor.

## 2019-08-16 NOTE — PROGRESS NOTES
ett advanced from 23cm @ lip to 26cm @ lip per xray results and Dr. Burton Onancock order. Chest xray to be obtained to verify placement.

## 2019-08-16 NOTE — CONSULTS
[address] [place] [address], [address] [facility]                                  CONSULTATION    PATIENT NAME: Tayler Alcala               :        1990  MED REC NO:   [id number]                          ROOM:       2102  ACCOUNT NO:   [de-identified]                           ADMIT DATE: 2019  PROVIDER:     Shaquille Umana MD    CONSULT DATE:  08/15/2019    HISTORY OF PRESENT ILLNESS:  The patient is a 21-year-old gentleman with  a history of hypertension, drug abuse, who was brought to the emergency  room after he was found unresponsive at home. The patient was  intubated, placed on the ventilator and subsequently admitted to the  intensive care unit. The patient was very agitated and required  significant sedation. According to the patient's family, he was  recently discharged from rehab and was doing well until recently when  they started to notice change in his behavior. On the day of admission,  he was found slumped in the bathroom and was unresponsive. He was given  Narcan with no response by the EMS. He was then brought to the  emergency room and was intubated, placed on the ventilator, and admitted  to the intensive care unit. There was no history of nausea, vomiting,  fever, or chills. No history of hemoptysis or hematemesis. PAST MEDICAL HISTORY:  Significant for hypertension. PAST SURGICAL HISTORY:  Remarkable for arm surgery. FAMILY HISTORY:  Noncontributory. SOCIAL HISTORY:  Reveals that he smokes about a pack per day and has  been smoking for few years. No history of alcohol abuse. He has a  history of drug abuse. MEDICATIONS:  Reviewed. ALLERGIES:  He has no known allergies. REVIEW OF SYSTEMS:  Unobtainable at this time. PHYSICAL EXAMINATION:  GENERAL:  The patient is sedated on the vent, in no respiratory  distress.   VITAL SIGNS:  His blood pressure is 113/66 mmHg, pulse of 93 per minute,  and respiratory rate of 14 per minute. His temperature is 99.9 degrees  Fahrenheit. HEENT:  Essentially unremarkable. There is no JVD. No lymphadenopathy. The neck is supple. He is orally intubated. LUNGS:  Clear to auscultation. HEART:  Showed normal S1 and S2. There was no S3 or S4 noted. ABDOMEN:  Benign. There is no evidence of any organomegaly. The bowel  sounds are present. DATA:  His chest x-ray showed ET tube 3.7 cm above the geno. No acute  infiltrate. His electrolytes were unremarkable. His CBC showed a white  count of 12.4, hemoglobin 13.8, and hematocrit of 42.2. His ABGs this  morning showed a pH of 7.41, pCO2 of 50, and pO2 of 82 with 94%  saturation. His urine drug screen was positive for cocaine,  benzodiazepine, opiates, and phencyclidine. IMPRESSION:  1. Acute respiratory failure secondary to #2. 2.  Drug overdose. 3.  Possible aspiration. PLAN:  1. Continue full ventilator support. 2.  Check procalcitonin level. 3.  Continue present antibiotic therapy. 4.  DuoNebs q.4 h. while awake. 5.  We will do sedation vacation as the patient is appropriate and  following commands. We will wean per protocol. 6.  Check mag and phos. 7.  Discussed with the family in detail. 8.  As per orders.         Mana Bolanos MD    D: 08/15/2019 11:00:12       T: 08/15/2019 21:02:12     [email or web address]  Job#: [id number]     Doc#: [id number]    CC:  <>

## 2019-08-16 NOTE — PROGRESS NOTES
vacation on 8/15  Reportedly becomes agitated when sedation is being weaned down      Ten point ROS reviewed negative, unless as noted above    Objective: Intake/Output Summary (Last 24 hours) at 8/16/2019 0853  Last data filed at 8/16/2019 0653  Gross per 24 hour   Intake 2743.28 ml   Output 1355 ml   Net 1388.28 ml      Vitals:   Vitals:    08/16/19 0832   BP: 121/77   Pulse: 81   Resp: 27   Temp: 98.2 °F (36.8 °C)   SpO2:      Physical Exam:    GEN Intubated and sedated   HENT ETT and NGT in place   RESP Clear to auscultation, no wheezes, rales or rhonchi. CARDIO/VASC -S1/S2 auscultated. Regular rate without appreciable murmurs, rubs, or gallops. Peripheral pulses equal bilaterally and palpable. No peripheral edema. GI Abdomen is soft without significant tenderness, masses, or guarding. Bowel sounds are normoactive. Rectal exam deferred.  Polk catheter is present. MSK No gross joint deformities. Spontaneous movement of all extremities  SKIN Normal coloration, warm, dry.  Several tattoo marks   NEURO Intubated and sedated     Medications:   Medications:    propofol  10 mcg/kg/min Intravenous Once    nicotine  1 patch Transdermal Q24H    sodium chloride flush  10 mL Intravenous 2 times per day    enoxaparin  40 mg Subcutaneous Daily    ampicillin-sulbactam  1.5 g Intravenous Q6H    fentanNYL  100 mcg Intravenous Once    ketamine  80 mg Intravenous Once    albuterol sulfate HFA  4 puff Inhalation Q4H    And    ipratropium  4 puff Inhalation Q4H    chlorhexidine  15 mL Mouth/Throat BID    pantoprazole  40 mg Intravenous Daily    And    sodium chloride (PF)  10 mL Intravenous Daily      Infusions:    sodium chloride 75 mL/hr at 08/16/19 0603    fentanyl 200 mcg/hr (08/16/19 0635)    propofol 80 mcg/kg/min (08/16/19 0703)    midazolam 9 mg/hr (08/16/19 0710)     PRN Meds:     acetaminophen 650 mg Q4H PRN   sodium chloride flush 10 mL PRN   magnesium hydroxide 30 mL Daily PRN   potassium

## 2019-08-17 ENCOUNTER — APPOINTMENT (OUTPATIENT)
Dept: GENERAL RADIOLOGY | Age: 29
DRG: 917 | End: 2019-08-17

## 2019-08-17 LAB
ANION GAP SERPL CALCULATED.3IONS-SCNC: 11 MMOL/L (ref 4–16)
BASE EXCESS MIXED: ABNORMAL (ref 0–1.2)
BUN BLDV-MCNC: 7 MG/DL (ref 6–23)
CALCIUM SERPL-MCNC: 8.6 MG/DL (ref 8.3–10.6)
CARBON MONOXIDE, BLOOD: 1.6 % (ref 0–5)
CHLORIDE BLD-SCNC: 106 MMOL/L (ref 99–110)
CO2 CONTENT: 30.2 MMOL/L (ref 19–24)
CO2: 23 MMOL/L (ref 21–32)
COMMENT: ABNORMAL
CREAT SERPL-MCNC: 0.8 MG/DL (ref 0.9–1.3)
GFR AFRICAN AMERICAN: >60 ML/MIN/1.73M2
GFR NON-AFRICAN AMERICAN: >60 ML/MIN/1.73M2
GLUCOSE BLD-MCNC: 121 MG/DL (ref 70–99)
HCO3 ARTERIAL: 29 MMOL/L (ref 18–23)
HCT VFR BLD CALC: 43.5 % (ref 42–52)
HEMOGLOBIN: 14 GM/DL (ref 13.5–18)
MAGNESIUM: 1.7 MG/DL (ref 1.8–2.4)
MCH RBC QN AUTO: 30.4 PG (ref 27–31)
MCHC RBC AUTO-ENTMCNC: 32.2 % (ref 32–36)
MCV RBC AUTO: 94.6 FL (ref 78–100)
METHEMOGLOBIN ARTERIAL: 1.3 %
O2 SATURATION: 87.6 % (ref 96–97)
PCO2 ARTERIAL: 39 MMHG (ref 32–45)
PDW BLD-RTO: 12.3 % (ref 11.7–14.9)
PH BLOOD: 7.48 (ref 7.34–7.45)
PHOSPHORUS: 3.2 MG/DL (ref 2.5–4.9)
PLATELET # BLD: 209 K/CU MM (ref 140–440)
PMV BLD AUTO: 10.1 FL (ref 7.5–11.1)
PO2 ARTERIAL: 49 MMHG (ref 75–100)
POTASSIUM SERPL-SCNC: 4.7 MMOL/L (ref 3.5–5.1)
RBC # BLD: 4.6 M/CU MM (ref 4.6–6.2)
REASON FOR REJECTION: NORMAL
REASON FOR REJECTION: NORMAL
REJECTED TEST: NORMAL
SODIUM BLD-SCNC: 140 MMOL/L (ref 135–145)
WBC # BLD: 12.1 K/CU MM (ref 4–10.5)

## 2019-08-17 PROCEDURE — 94761 N-INVAS EAR/PLS OXIMETRY MLT: CPT

## 2019-08-17 PROCEDURE — 84100 ASSAY OF PHOSPHORUS: CPT

## 2019-08-17 PROCEDURE — 6360000002 HC RX W HCPCS: Performed by: INTERNAL MEDICINE

## 2019-08-17 PROCEDURE — 2580000003 HC RX 258: Performed by: INTERNAL MEDICINE

## 2019-08-17 PROCEDURE — 2500000003 HC RX 250 WO HCPCS: Performed by: INTERNAL MEDICINE

## 2019-08-17 PROCEDURE — 2700000000 HC OXYGEN THERAPY PER DAY

## 2019-08-17 PROCEDURE — 6370000000 HC RX 637 (ALT 250 FOR IP): Performed by: NURSE PRACTITIONER

## 2019-08-17 PROCEDURE — 87040 BLOOD CULTURE FOR BACTERIA: CPT

## 2019-08-17 PROCEDURE — 94003 VENT MGMT INPAT SUBQ DAY: CPT

## 2019-08-17 PROCEDURE — C9113 INJ PANTOPRAZOLE SODIUM, VIA: HCPCS | Performed by: INTERNAL MEDICINE

## 2019-08-17 PROCEDURE — 71045 X-RAY EXAM CHEST 1 VIEW: CPT

## 2019-08-17 PROCEDURE — 82803 BLOOD GASES ANY COMBINATION: CPT

## 2019-08-17 PROCEDURE — 83735 ASSAY OF MAGNESIUM: CPT

## 2019-08-17 PROCEDURE — 85027 COMPLETE CBC AUTOMATED: CPT

## 2019-08-17 PROCEDURE — 2000000000 HC ICU R&B

## 2019-08-17 PROCEDURE — 31720 CLEARANCE OF AIRWAYS: CPT

## 2019-08-17 PROCEDURE — 80048 BASIC METABOLIC PNL TOTAL CA: CPT

## 2019-08-17 PROCEDURE — 6370000000 HC RX 637 (ALT 250 FOR IP): Performed by: INTERNAL MEDICINE

## 2019-08-17 PROCEDURE — 89220 SPUTUM SPECIMEN COLLECTION: CPT

## 2019-08-17 PROCEDURE — 36600 WITHDRAWAL OF ARTERIAL BLOOD: CPT

## 2019-08-17 PROCEDURE — 94640 AIRWAY INHALATION TREATMENT: CPT

## 2019-08-17 PROCEDURE — 36415 COLL VENOUS BLD VENIPUNCTURE: CPT

## 2019-08-17 RX ORDER — MAGNESIUM SULFATE IN WATER 40 MG/ML
2 INJECTION, SOLUTION INTRAVENOUS ONCE
Status: COMPLETED | OUTPATIENT
Start: 2019-08-17 | End: 2019-08-17

## 2019-08-17 RX ORDER — ACETAMINOPHEN 650 MG/1
650 SUPPOSITORY RECTAL EVERY 4 HOURS PRN
Status: DISCONTINUED | OUTPATIENT
Start: 2019-08-17 | End: 2019-08-24

## 2019-08-17 RX ORDER — FUROSEMIDE 10 MG/ML
40 INJECTION INTRAMUSCULAR; INTRAVENOUS ONCE
Status: COMPLETED | OUTPATIENT
Start: 2019-08-17 | End: 2019-08-17

## 2019-08-17 RX ADMIN — ALBUTEROL SULFATE 4 PUFF: 90 AEROSOL, METERED RESPIRATORY (INHALATION) at 11:14

## 2019-08-17 RX ADMIN — IPRATROPIUM BROMIDE 4 PUFF: 17 AEROSOL, METERED RESPIRATORY (INHALATION) at 15:59

## 2019-08-17 RX ADMIN — IPRATROPIUM BROMIDE 4 PUFF: 17 AEROSOL, METERED RESPIRATORY (INHALATION) at 11:13

## 2019-08-17 RX ADMIN — IPRATROPIUM BROMIDE 4 PUFF: 17 AEROSOL, METERED RESPIRATORY (INHALATION) at 04:12

## 2019-08-17 RX ADMIN — ALBUTEROL SULFATE 4 PUFF: 90 AEROSOL, METERED RESPIRATORY (INHALATION) at 15:59

## 2019-08-17 RX ADMIN — IPRATROPIUM BROMIDE 4 PUFF: 17 AEROSOL, METERED RESPIRATORY (INHALATION) at 07:35

## 2019-08-17 RX ADMIN — SODIUM CHLORIDE: 9 INJECTION, SOLUTION INTRAVENOUS at 09:17

## 2019-08-17 RX ADMIN — PROPOFOL 80 MCG/KG/MIN: 10 INJECTION, EMULSION INTRAVENOUS at 11:55

## 2019-08-17 RX ADMIN — FUROSEMIDE 40 MG: 10 INJECTION, SOLUTION INTRAVENOUS at 10:02

## 2019-08-17 RX ADMIN — PANTOPRAZOLE SODIUM 40 MG: 40 INJECTION, POWDER, FOR SOLUTION INTRAVENOUS at 08:47

## 2019-08-17 RX ADMIN — Medication 10 ML: at 08:46

## 2019-08-17 RX ADMIN — PROPOFOL 80 MCG/KG/MIN: 10 INJECTION, EMULSION INTRAVENOUS at 05:01

## 2019-08-17 RX ADMIN — IPRATROPIUM BROMIDE 4 PUFF: 17 AEROSOL, METERED RESPIRATORY (INHALATION) at 19:50

## 2019-08-17 RX ADMIN — Medication 200 MCG/HR: at 09:37

## 2019-08-17 RX ADMIN — ZIPRASIDONE MESYLATE 20 MG: 20 INJECTION, POWDER, LYOPHILIZED, FOR SOLUTION INTRAMUSCULAR at 07:38

## 2019-08-17 RX ADMIN — CLONIDINE HYDROCHLORIDE 0.2 MG: 0.2 TABLET ORAL at 20:44

## 2019-08-17 RX ADMIN — CHLORHEXIDINE GLUCONATE 0.12% ORAL RINSE 15 ML: 1.2 LIQUID ORAL at 20:44

## 2019-08-17 RX ADMIN — MAGNESIUM SULFATE HEPTAHYDRATE 2 G: 40 INJECTION, SOLUTION INTRAVENOUS at 10:02

## 2019-08-17 RX ADMIN — ALBUTEROL SULFATE 4 PUFF: 90 AEROSOL, METERED RESPIRATORY (INHALATION) at 00:18

## 2019-08-17 RX ADMIN — AMPICILLIN AND SULBACTAM 1.5 G: 1; .5 INJECTION, POWDER, FOR SOLUTION INTRAMUSCULAR; INTRAVENOUS at 20:46

## 2019-08-17 RX ADMIN — ACETAMINOPHEN 650 MG: 325 TABLET ORAL at 07:39

## 2019-08-17 RX ADMIN — MIDAZOLAM HYDROCHLORIDE 10 MG/HR: 5 INJECTION, SOLUTION INTRAMUSCULAR; INTRAVENOUS at 15:23

## 2019-08-17 RX ADMIN — ACETAMINOPHEN 650 MG: 650 SUPPOSITORY RECTAL at 17:49

## 2019-08-17 RX ADMIN — PROPOFOL 80 MCG/KG/MIN: 10 INJECTION, EMULSION INTRAVENOUS at 09:17

## 2019-08-17 RX ADMIN — CHLORHEXIDINE GLUCONATE 0.12% ORAL RINSE 15 ML: 1.2 LIQUID ORAL at 08:44

## 2019-08-17 RX ADMIN — ALBUTEROL SULFATE 4 PUFF: 90 AEROSOL, METERED RESPIRATORY (INHALATION) at 04:12

## 2019-08-17 RX ADMIN — Medication 200 MCG/HR: at 20:36

## 2019-08-17 RX ADMIN — IPRATROPIUM BROMIDE 4 PUFF: 17 AEROSOL, METERED RESPIRATORY (INHALATION) at 00:18

## 2019-08-17 RX ADMIN — MIDAZOLAM HYDROCHLORIDE 10 MG/HR: 5 INJECTION, SOLUTION INTRAMUSCULAR; INTRAVENOUS at 09:37

## 2019-08-17 RX ADMIN — Medication 200 MCG/HR: at 15:19

## 2019-08-17 RX ADMIN — AMPICILLIN AND SULBACTAM 1.5 G: 1; .5 INJECTION, POWDER, FOR SOLUTION INTRAMUSCULAR; INTRAVENOUS at 08:45

## 2019-08-17 RX ADMIN — SODIUM CHLORIDE 2 MCG/KG/MIN: 9 INJECTION, SOLUTION INTRAVENOUS at 08:47

## 2019-08-17 RX ADMIN — ALBUTEROL SULFATE 4 PUFF: 90 AEROSOL, METERED RESPIRATORY (INHALATION) at 07:35

## 2019-08-17 RX ADMIN — ALBUTEROL SULFATE 4 PUFF: 90 AEROSOL, METERED RESPIRATORY (INHALATION) at 19:52

## 2019-08-17 RX ADMIN — PROPOFOL 80 MCG/KG/MIN: 10 INJECTION, EMULSION INTRAVENOUS at 20:12

## 2019-08-17 RX ADMIN — ENOXAPARIN SODIUM 40 MG: 100 INJECTION SUBCUTANEOUS at 08:45

## 2019-08-17 RX ADMIN — CLONIDINE HYDROCHLORIDE 0.2 MG: 0.2 TABLET ORAL at 08:45

## 2019-08-17 RX ADMIN — AMPICILLIN AND SULBACTAM 1.5 G: 1; .5 INJECTION, POWDER, FOR SOLUTION INTRAMUSCULAR; INTRAVENOUS at 02:06

## 2019-08-17 RX ADMIN — AMPICILLIN AND SULBACTAM 1.5 G: 1; .5 INJECTION, POWDER, FOR SOLUTION INTRAMUSCULAR; INTRAVENOUS at 15:57

## 2019-08-17 RX ADMIN — PROPOFOL 80 MCG/KG/MIN: 10 INJECTION, EMULSION INTRAVENOUS at 15:30

## 2019-08-17 RX ADMIN — SODIUM CHLORIDE 3.5 MCG/KG/MIN: 9 INJECTION, SOLUTION INTRAVENOUS at 21:37

## 2019-08-17 RX ADMIN — ACETAMINOPHEN 650 MG: 650 SUPPOSITORY RECTAL at 13:31

## 2019-08-17 RX ADMIN — MIDAZOLAM HYDROCHLORIDE 10 MG/HR: 5 INJECTION, SOLUTION INTRAMUSCULAR; INTRAVENOUS at 05:07

## 2019-08-17 RX ADMIN — PROPOFOL 80 MCG/KG/MIN: 10 INJECTION, EMULSION INTRAVENOUS at 06:52

## 2019-08-17 RX ADMIN — PROPOFOL 80 MCG/KG/MIN: 10 INJECTION, EMULSION INTRAVENOUS at 17:35

## 2019-08-17 RX ADMIN — PROPOFOL 80 MCG/KG/MIN: 10 INJECTION, EMULSION INTRAVENOUS at 01:55

## 2019-08-17 RX ADMIN — PROPOFOL 80 MCG/KG/MIN: 10 INJECTION, EMULSION INTRAVENOUS at 23:02

## 2019-08-17 RX ADMIN — Medication 200 MCG/HR: at 05:02

## 2019-08-17 ASSESSMENT — PULMONARY FUNCTION TESTS
PIF_VALUE: 25
PIF_VALUE: 25
PIF_VALUE: 28
PIF_VALUE: 21
PIF_VALUE: 19
PIF_VALUE: 24
PIF_VALUE: 25
PIF_VALUE: 22
PIF_VALUE: 33
PIF_VALUE: 28
PIF_VALUE: 27

## 2019-08-17 ASSESSMENT — PAIN SCALES - GENERAL
PAINLEVEL_OUTOF10: 0

## 2019-08-17 NOTE — PROGRESS NOTES
Notified Dr. Julia Ambrose regarding noted tachycardia, fever, and increasing O2 requirements on vent. Asked if he thought endocarditis would be a possibility. Order received for 2 blood cultures and Tylenol suppository for fevers.

## 2019-08-18 ENCOUNTER — APPOINTMENT (OUTPATIENT)
Dept: GENERAL RADIOLOGY | Age: 29
DRG: 917 | End: 2019-08-18

## 2019-08-18 LAB
ANION GAP SERPL CALCULATED.3IONS-SCNC: 8 MMOL/L (ref 4–16)
BASE EXCESS MIXED: ABNORMAL (ref 0–1.2)
BUN BLDV-MCNC: 10 MG/DL (ref 6–23)
CALCIUM SERPL-MCNC: 9.3 MG/DL (ref 8.3–10.6)
CARBON MONOXIDE, BLOOD: 1.2 % (ref 0–5)
CHLORIDE BLD-SCNC: 105 MMOL/L (ref 99–110)
CO2 CONTENT: 32.4 MMOL/L (ref 19–24)
CO2: 31 MMOL/L (ref 21–32)
COMMENT: ABNORMAL
CREAT SERPL-MCNC: 0.7 MG/DL (ref 0.9–1.3)
CULTURE: ABNORMAL
GFR AFRICAN AMERICAN: >60 ML/MIN/1.73M2
GFR NON-AFRICAN AMERICAN: >60 ML/MIN/1.73M2
GLUCOSE BLD-MCNC: 101 MG/DL (ref 70–99)
GRAM SMEAR: ABNORMAL
HCO3 ARTERIAL: 31.2 MMOL/L (ref 18–23)
HCT VFR BLD CALC: 44 % (ref 42–52)
HEMOGLOBIN: 13.9 GM/DL (ref 13.5–18)
Lab: ABNORMAL
MAGNESIUM: 1.9 MG/DL (ref 1.8–2.4)
MCH RBC QN AUTO: 30.8 PG (ref 27–31)
MCHC RBC AUTO-ENTMCNC: 31.6 % (ref 32–36)
MCV RBC AUTO: 97.3 FL (ref 78–100)
METHEMOGLOBIN ARTERIAL: 0.9 %
O2 SATURATION: 96.6 % (ref 96–97)
PCO2 ARTERIAL: 40 MMHG (ref 32–45)
PDW BLD-RTO: 12.4 % (ref 11.7–14.9)
PH BLOOD: 7.5 (ref 7.34–7.45)
PHOSPHORUS: 4 MG/DL (ref 2.5–4.9)
PLATELET # BLD: 219 K/CU MM (ref 140–440)
PMV BLD AUTO: 10 FL (ref 7.5–11.1)
PO2 ARTERIAL: 109 MMHG (ref 75–100)
POTASSIUM SERPL-SCNC: 4.5 MMOL/L (ref 3.5–5.1)
RBC # BLD: 4.52 M/CU MM (ref 4.6–6.2)
SODIUM BLD-SCNC: 144 MMOL/L (ref 135–145)
SPECIMEN: ABNORMAL
WBC # BLD: 14.8 K/CU MM (ref 4–10.5)

## 2019-08-18 PROCEDURE — 87081 CULTURE SCREEN ONLY: CPT

## 2019-08-18 PROCEDURE — 82803 BLOOD GASES ANY COMBINATION: CPT

## 2019-08-18 PROCEDURE — 2580000003 HC RX 258: Performed by: INTERNAL MEDICINE

## 2019-08-18 PROCEDURE — 6360000002 HC RX W HCPCS: Performed by: INTERNAL MEDICINE

## 2019-08-18 PROCEDURE — 71045 X-RAY EXAM CHEST 1 VIEW: CPT

## 2019-08-18 PROCEDURE — 85027 COMPLETE CBC AUTOMATED: CPT

## 2019-08-18 PROCEDURE — 2000000000 HC ICU R&B

## 2019-08-18 PROCEDURE — 83735 ASSAY OF MAGNESIUM: CPT

## 2019-08-18 PROCEDURE — 2500000003 HC RX 250 WO HCPCS: Performed by: INTERNAL MEDICINE

## 2019-08-18 PROCEDURE — 94761 N-INVAS EAR/PLS OXIMETRY MLT: CPT

## 2019-08-18 PROCEDURE — 31720 CLEARANCE OF AIRWAYS: CPT

## 2019-08-18 PROCEDURE — 80048 BASIC METABOLIC PNL TOTAL CA: CPT

## 2019-08-18 PROCEDURE — 2700000000 HC OXYGEN THERAPY PER DAY

## 2019-08-18 PROCEDURE — 94640 AIRWAY INHALATION TREATMENT: CPT

## 2019-08-18 PROCEDURE — 6370000000 HC RX 637 (ALT 250 FOR IP): Performed by: INTERNAL MEDICINE

## 2019-08-18 PROCEDURE — 84100 ASSAY OF PHOSPHORUS: CPT

## 2019-08-18 PROCEDURE — 94003 VENT MGMT INPAT SUBQ DAY: CPT

## 2019-08-18 PROCEDURE — C9113 INJ PANTOPRAZOLE SODIUM, VIA: HCPCS | Performed by: INTERNAL MEDICINE

## 2019-08-18 PROCEDURE — 36600 WITHDRAWAL OF ARTERIAL BLOOD: CPT

## 2019-08-18 PROCEDURE — 94750 HC PULMONARY COMPLIANCE STUDY: CPT

## 2019-08-18 RX ORDER — TRAMADOL HYDROCHLORIDE 50 MG/1
50 TABLET ORAL EVERY 4 HOURS PRN
Status: DISCONTINUED | OUTPATIENT
Start: 2019-08-18 | End: 2019-08-18

## 2019-08-18 RX ADMIN — MIDAZOLAM HYDROCHLORIDE 10 MG/HR: 5 INJECTION, SOLUTION INTRAMUSCULAR; INTRAVENOUS at 14:05

## 2019-08-18 RX ADMIN — PROPOFOL 80 MCG/KG/MIN: 10 INJECTION, EMULSION INTRAVENOUS at 04:15

## 2019-08-18 RX ADMIN — IPRATROPIUM BROMIDE 4 PUFF: 17 AEROSOL, METERED RESPIRATORY (INHALATION) at 05:22

## 2019-08-18 RX ADMIN — VANCOMYCIN HYDROCHLORIDE 1250 MG: 5 INJECTION, POWDER, LYOPHILIZED, FOR SOLUTION INTRAVENOUS at 18:14

## 2019-08-18 RX ADMIN — ALBUTEROL SULFATE 4 PUFF: 90 AEROSOL, METERED RESPIRATORY (INHALATION) at 20:00

## 2019-08-18 RX ADMIN — SODIUM CHLORIDE 3 MCG/KG/MIN: 9 INJECTION, SOLUTION INTRAVENOUS at 12:23

## 2019-08-18 RX ADMIN — Medication 2000 MCG/HR: at 18:11

## 2019-08-18 RX ADMIN — IPRATROPIUM BROMIDE 4 PUFF: 17 AEROSOL, METERED RESPIRATORY (INHALATION) at 07:18

## 2019-08-18 RX ADMIN — IPRATROPIUM BROMIDE 4 PUFF: 17 AEROSOL, METERED RESPIRATORY (INHALATION) at 11:15

## 2019-08-18 RX ADMIN — MIDAZOLAM HYDROCHLORIDE 10 MG/HR: 5 INJECTION, SOLUTION INTRAMUSCULAR; INTRAVENOUS at 23:42

## 2019-08-18 RX ADMIN — SODIUM CHLORIDE 2 MCG/KG/MIN: 9 INJECTION, SOLUTION INTRAVENOUS at 22:44

## 2019-08-18 RX ADMIN — ZIPRASIDONE MESYLATE 20 MG: 20 INJECTION, POWDER, LYOPHILIZED, FOR SOLUTION INTRAMUSCULAR at 22:03

## 2019-08-18 RX ADMIN — AMPICILLIN AND SULBACTAM 1.5 G: 1; .5 INJECTION, POWDER, FOR SOLUTION INTRAMUSCULAR; INTRAVENOUS at 07:49

## 2019-08-18 RX ADMIN — Medication 200 MCG/HR: at 23:41

## 2019-08-18 RX ADMIN — SODIUM CHLORIDE: 9 INJECTION, SOLUTION INTRAVENOUS at 09:55

## 2019-08-18 RX ADMIN — ALBUTEROL SULFATE 4 PUFF: 90 AEROSOL, METERED RESPIRATORY (INHALATION) at 00:12

## 2019-08-18 RX ADMIN — ALBUTEROL SULFATE 4 PUFF: 90 AEROSOL, METERED RESPIRATORY (INHALATION) at 11:15

## 2019-08-18 RX ADMIN — PROPOFOL 80 MCG/KG/MIN: 10 INJECTION, EMULSION INTRAVENOUS at 20:43

## 2019-08-18 RX ADMIN — ALBUTEROL SULFATE 2 PUFF: 90 AEROSOL, METERED RESPIRATORY (INHALATION) at 19:40

## 2019-08-18 RX ADMIN — ALBUTEROL SULFATE 4 PUFF: 90 AEROSOL, METERED RESPIRATORY (INHALATION) at 23:15

## 2019-08-18 RX ADMIN — PROPOFOL 80 MCG/KG/MIN: 10 INJECTION, EMULSION INTRAVENOUS at 23:03

## 2019-08-18 RX ADMIN — IPRATROPIUM BROMIDE 4 PUFF: 17 AEROSOL, METERED RESPIRATORY (INHALATION) at 14:59

## 2019-08-18 RX ADMIN — IPRATROPIUM BROMIDE 4 PUFF: 17 AEROSOL, METERED RESPIRATORY (INHALATION) at 19:42

## 2019-08-18 RX ADMIN — Medication 80 MCG/HR: at 08:01

## 2019-08-18 RX ADMIN — IPRATROPIUM BROMIDE 4 PUFF: 17 AEROSOL, METERED RESPIRATORY (INHALATION) at 00:00

## 2019-08-18 RX ADMIN — PROPOFOL 80 MCG/KG/MIN: 10 INJECTION, EMULSION INTRAVENOUS at 07:07

## 2019-08-18 RX ADMIN — CLONIDINE HYDROCHLORIDE 0.2 MG: 0.2 TABLET ORAL at 08:28

## 2019-08-18 RX ADMIN — AMPICILLIN AND SULBACTAM 1.5 G: 1; .5 INJECTION, POWDER, FOR SOLUTION INTRAMUSCULAR; INTRAVENOUS at 02:04

## 2019-08-18 RX ADMIN — CHLORHEXIDINE GLUCONATE 0.12% ORAL RINSE 15 ML: 1.2 LIQUID ORAL at 21:02

## 2019-08-18 RX ADMIN — PROPOFOL 80 MCG/KG/MIN: 10 INJECTION, EMULSION INTRAVENOUS at 09:55

## 2019-08-18 RX ADMIN — Medication 10 ML: at 08:30

## 2019-08-18 RX ADMIN — ALBUTEROL SULFATE 4 PUFF: 90 AEROSOL, METERED RESPIRATORY (INHALATION) at 14:59

## 2019-08-18 RX ADMIN — ALBUTEROL SULFATE 4 PUFF: 90 AEROSOL, METERED RESPIRATORY (INHALATION) at 07:18

## 2019-08-18 RX ADMIN — PROPOFOL 80 MCG/KG/MIN: 10 INJECTION, EMULSION INTRAVENOUS at 12:30

## 2019-08-18 RX ADMIN — VANCOMYCIN HYDROCHLORIDE 1250 MG: 5 INJECTION, POWDER, LYOPHILIZED, FOR SOLUTION INTRAVENOUS at 10:33

## 2019-08-18 RX ADMIN — CLONIDINE HYDROCHLORIDE 0.2 MG: 0.2 TABLET ORAL at 21:02

## 2019-08-18 RX ADMIN — PANTOPRAZOLE SODIUM 40 MG: 40 INJECTION, POWDER, FOR SOLUTION INTRAVENOUS at 08:28

## 2019-08-18 RX ADMIN — CEFEPIME HYDROCHLORIDE 2 G: 2 INJECTION, POWDER, FOR SOLUTION INTRAVENOUS at 14:54

## 2019-08-18 RX ADMIN — PROPOFOL 80 MCG/KG/MIN: 10 INJECTION, EMULSION INTRAVENOUS at 02:04

## 2019-08-18 RX ADMIN — ALBUTEROL SULFATE 4 PUFF: 90 AEROSOL, METERED RESPIRATORY (INHALATION) at 05:23

## 2019-08-18 RX ADMIN — CHLORHEXIDINE GLUCONATE 0.12% ORAL RINSE 15 ML: 1.2 LIQUID ORAL at 08:28

## 2019-08-18 RX ADMIN — MIDAZOLAM HYDROCHLORIDE 10 MG/HR: 5 INJECTION, SOLUTION INTRAMUSCULAR; INTRAVENOUS at 02:16

## 2019-08-18 RX ADMIN — PROPOFOL 80 MCG/KG/MIN: 10 INJECTION, EMULSION INTRAVENOUS at 15:12

## 2019-08-18 RX ADMIN — PROPOFOL 80 MCG/KG/MIN: 10 INJECTION, EMULSION INTRAVENOUS at 17:32

## 2019-08-18 RX ADMIN — Medication 200 MCG/HR: at 02:18

## 2019-08-18 RX ADMIN — ENOXAPARIN SODIUM 40 MG: 100 INJECTION SUBCUTANEOUS at 08:28

## 2019-08-18 RX ADMIN — Medication 200 MCG/HR: at 13:53

## 2019-08-18 RX ADMIN — IPRATROPIUM BROMIDE 4 PUFF: 17 AEROSOL, METERED RESPIRATORY (INHALATION) at 23:17

## 2019-08-18 ASSESSMENT — PULMONARY FUNCTION TESTS
PIF_VALUE: 24
PIF_VALUE: 25
PIF_VALUE: 24
PIF_VALUE: 25
PIF_VALUE: 24
PIF_VALUE: 25
PIF_VALUE: 26
PIF_VALUE: 23
PIF_VALUE: 25
PIF_VALUE: 24
PIF_VALUE: 25
PIF_VALUE: 24
PIF_VALUE: 25
PIF_VALUE: 19
PIF_VALUE: 21
PIF_VALUE: 25
PIF_VALUE: 21

## 2019-08-18 ASSESSMENT — PAIN SCALES - GENERAL
PAINLEVEL_OUTOF10: 0

## 2019-08-18 NOTE — PROGRESS NOTES
[facility]    Hospitalist Progress Note      Name:  Sonia Alvares /Age/Sex: 1990  (30 y.o. male)   MRN & CSN:  [id number] & [id number] Admission Date/Time: 2019 12:41 PM   Location:  -A PCP: No primary care provider on file. Hospital Day: 5    Assessment and Plan:   Sonia Alvares is a 30 y.o.  male with past medical history of drug abuse - multi-substance- who presents with unresponsiveness at home      # Unresponsiveness - Acute toxic encephalopathy     From multiple drug use and possible overdose - found unresponsive at home,   Given several doses of Narcan by EMS with minimal to no response -  Urine drug screen positive for cocaine, benzo, opiates, PCP - CT head unremarkable   Intubated in ER due to respiratory failure  Requiring high sedation to keep him calm and started on Nimbex (paralytic) on      # Acute hypoxic respiratory failure     Intubated on  in ED- Vent management per Pulmonary   Paralytic managed by pulmonary      # Severe Agitation and restlessness     Likely from withdrawal of various drugs   Maxed out on all propofol, Versed and fentanyl - also on physical restraints   Nimbex started per Pulmonary on      # Aspiration pneumonia    Patient reportedly vomited several times at home before admission-  Change Unasyn to Vancomycin and Cefepime - continues to spike fever   Blood culture pending - will consider ECHO if any positive     # Multi-substance abuse     Patient had repeated rehab admissions with repeated relapses   May have to start on COWS protocol post extubation - Nicoderm patch     No family present at the bedside     Diet DIET TUBE FEED CONTINUOUS/CYCLIC NPO; Low Calorie High Protein (Vital HP);  Nasogastric; Continuous; 25; 40; 24  Dietary Nutrition Supplements: Protein Modular  Dietary Nutrition Supplements: Other Oral Supplement (see comment)   DVT Prophylaxis [x] Lovenox, []  Heparin, [] SCDs, []No VTE prophylaxis, patient ambulating 10 mL Intravenous Daily      Infusions:    cisatracurium (NIMBEX) infusion 3 mcg/kg/min (08/18/19 1106)    sodium chloride 75 mL/hr at 08/18/19 0955    fentanyl 80 mcg/hr (08/18/19 0801)    propofol 80 mcg/kg/min (08/18/19 0955)    midazolam 10 mg/hr (08/18/19 0216)     PRN Meds:     acetaminophen 650 mg Q4H PRN   ziprasidone 20 mg Q4H PRN   And     sterile water 1.2 mL Q4H PRN   sodium chloride flush 10 mL PRN   magnesium hydroxide 30 mL Daily PRN   potassium chloride 10 mEq PRN   potassium chloride 20 mEq PRN   albuterol sulfate HFA 2 puff Q4H PRN   ondansetron 4 mg Q6H PRN         Electronically signed by Ale Roy MD on 8/18/2019 at 12:13 PM

## 2019-08-19 ENCOUNTER — APPOINTMENT (OUTPATIENT)
Dept: GENERAL RADIOLOGY | Age: 29
DRG: 917 | End: 2019-08-19

## 2019-08-19 LAB
ANION GAP SERPL CALCULATED.3IONS-SCNC: 7 MMOL/L (ref 4–16)
BASE EXCESS MIXED: ABNORMAL (ref 0–1.2)
BUN BLDV-MCNC: 18 MG/DL (ref 6–23)
CALCIUM SERPL-MCNC: 8.6 MG/DL (ref 8.3–10.6)
CARBON MONOXIDE, BLOOD: 1.8 % (ref 0–5)
CHLORIDE BLD-SCNC: 100 MMOL/L (ref 99–110)
CO2 CONTENT: 29.1 MMOL/L (ref 19–24)
CO2: 28 MMOL/L (ref 21–32)
COMMENT: ABNORMAL
CREAT SERPL-MCNC: 0.3 MG/DL (ref 0.9–1.3)
CULTURE: NORMAL
DOSE AMOUNT: ABNORMAL
DOSE TIME: ABNORMAL
GFR AFRICAN AMERICAN: >60 ML/MIN/1.73M2
GFR NON-AFRICAN AMERICAN: >60 ML/MIN/1.73M2
GLUCOSE BLD-MCNC: 115 MG/DL (ref 70–99)
HCO3 ARTERIAL: 27.8 MMOL/L (ref 18–23)
HCT VFR BLD CALC: 38.1 % (ref 42–52)
HEMOGLOBIN: 12.6 GM/DL (ref 13.5–18)
Lab: NORMAL
MAGNESIUM: 1.9 MG/DL (ref 1.8–2.4)
MCH RBC QN AUTO: 30.2 PG (ref 27–31)
MCHC RBC AUTO-ENTMCNC: 33.1 % (ref 32–36)
MCV RBC AUTO: 91.4 FL (ref 78–100)
METHEMOGLOBIN ARTERIAL: 1.4 %
O2 SATURATION: 96.2 % (ref 96–97)
PCO2 ARTERIAL: 41 MMHG (ref 32–45)
PDW BLD-RTO: 14.5 % (ref 11.7–14.9)
PH BLOOD: 7.44 (ref 7.34–7.45)
PHOSPHORUS: 1.7 MG/DL (ref 2.5–4.9)
PHOSPHORUS: 4.3 MG/DL (ref 2.5–4.9)
PLATELET # BLD: 133 K/CU MM (ref 140–440)
PMV BLD AUTO: 9.6 FL (ref 7.5–11.1)
PO2 ARTERIAL: 117 MMHG (ref 75–100)
POTASSIUM SERPL-SCNC: 3.1 MMOL/L (ref 3.5–5.1)
POTASSIUM SERPL-SCNC: 4.5 MMOL/L (ref 3.5–5.1)
RBC # BLD: 4.17 M/CU MM (ref 4.6–6.2)
SODIUM BLD-SCNC: 135 MMOL/L (ref 135–145)
SPECIMEN: NORMAL
VANCOMYCIN TROUGH: 7.7 UG/ML (ref 10–20)
WBC # BLD: 20.5 K/CU MM (ref 4–10.5)

## 2019-08-19 PROCEDURE — 6360000002 HC RX W HCPCS: Performed by: INTERNAL MEDICINE

## 2019-08-19 PROCEDURE — 89220 SPUTUM SPECIMEN COLLECTION: CPT

## 2019-08-19 PROCEDURE — 36569 INSJ PICC 5 YR+ W/O IMAGING: CPT

## 2019-08-19 PROCEDURE — 94761 N-INVAS EAR/PLS OXIMETRY MLT: CPT

## 2019-08-19 PROCEDURE — 76937 US GUIDE VASCULAR ACCESS: CPT

## 2019-08-19 PROCEDURE — 94003 VENT MGMT INPAT SUBQ DAY: CPT

## 2019-08-19 PROCEDURE — 6370000000 HC RX 637 (ALT 250 FOR IP): Performed by: INTERNAL MEDICINE

## 2019-08-19 PROCEDURE — 2500000003 HC RX 250 WO HCPCS: Performed by: INTERNAL MEDICINE

## 2019-08-19 PROCEDURE — C9113 INJ PANTOPRAZOLE SODIUM, VIA: HCPCS | Performed by: INTERNAL MEDICINE

## 2019-08-19 PROCEDURE — 82803 BLOOD GASES ANY COMBINATION: CPT

## 2019-08-19 PROCEDURE — 2000000000 HC ICU R&B

## 2019-08-19 PROCEDURE — 80048 BASIC METABOLIC PNL TOTAL CA: CPT

## 2019-08-19 PROCEDURE — 36592 COLLECT BLOOD FROM PICC: CPT

## 2019-08-19 PROCEDURE — 36600 WITHDRAWAL OF ARTERIAL BLOOD: CPT

## 2019-08-19 PROCEDURE — 83735 ASSAY OF MAGNESIUM: CPT

## 2019-08-19 PROCEDURE — 84132 ASSAY OF SERUM POTASSIUM: CPT

## 2019-08-19 PROCEDURE — 2580000003 HC RX 258: Performed by: INTERNAL MEDICINE

## 2019-08-19 PROCEDURE — 84100 ASSAY OF PHOSPHORUS: CPT

## 2019-08-19 PROCEDURE — 85027 COMPLETE CBC AUTOMATED: CPT

## 2019-08-19 PROCEDURE — 02H633Z INSERTION OF INFUSION DEVICE INTO RIGHT ATRIUM, PERCUTANEOUS APPROACH: ICD-10-PCS | Performed by: RADIOLOGY

## 2019-08-19 PROCEDURE — 94640 AIRWAY INHALATION TREATMENT: CPT

## 2019-08-19 PROCEDURE — 71045 X-RAY EXAM CHEST 1 VIEW: CPT

## 2019-08-19 PROCEDURE — 80202 ASSAY OF VANCOMYCIN: CPT

## 2019-08-19 PROCEDURE — C1751 CATH, INF, PER/CENT/MIDLINE: HCPCS

## 2019-08-19 PROCEDURE — 94750 HC PULMONARY COMPLIANCE STUDY: CPT

## 2019-08-19 PROCEDURE — 2700000000 HC OXYGEN THERAPY PER DAY

## 2019-08-19 RX ORDER — SODIUM CHLORIDE 0.9 % (FLUSH) 0.9 %
10 SYRINGE (ML) INJECTION PRN
Status: DISCONTINUED | OUTPATIENT
Start: 2019-08-19 | End: 2019-08-28 | Stop reason: HOSPADM

## 2019-08-19 RX ORDER — LIDOCAINE HYDROCHLORIDE 10 MG/ML
5 INJECTION, SOLUTION EPIDURAL; INFILTRATION; INTRACAUDAL; PERINEURAL ONCE
Status: COMPLETED | OUTPATIENT
Start: 2019-08-19 | End: 2019-08-19

## 2019-08-19 RX ORDER — SODIUM CHLORIDE 0.9 % (FLUSH) 0.9 %
10 SYRINGE (ML) INJECTION EVERY 12 HOURS SCHEDULED
Status: DISCONTINUED | OUTPATIENT
Start: 2019-08-19 | End: 2019-08-28 | Stop reason: HOSPADM

## 2019-08-19 RX ADMIN — SODIUM CHLORIDE 3.5 MCG/KG/MIN: 9 INJECTION, SOLUTION INTRAVENOUS at 18:38

## 2019-08-19 RX ADMIN — CEFEPIME HYDROCHLORIDE 2 G: 2 INJECTION, POWDER, FOR SOLUTION INTRAVENOUS at 02:48

## 2019-08-19 RX ADMIN — Medication 200 MCG/HR: at 11:01

## 2019-08-19 RX ADMIN — POTASSIUM PHOSPHATE, MONOBASIC AND POTASSIUM PHOSPHATE, DIBASIC 20 MMOL: 224; 236 INJECTION, SOLUTION INTRAVENOUS at 09:47

## 2019-08-19 RX ADMIN — CLONIDINE HYDROCHLORIDE 0.2 MG: 0.2 TABLET ORAL at 20:26

## 2019-08-19 RX ADMIN — VANCOMYCIN HYDROCHLORIDE 1500 MG: 5 INJECTION, POWDER, LYOPHILIZED, FOR SOLUTION INTRAVENOUS at 22:22

## 2019-08-19 RX ADMIN — PROPOFOL 80 MCG/KG/MIN: 10 INJECTION, EMULSION INTRAVENOUS at 07:07

## 2019-08-19 RX ADMIN — Medication 200 MCG/HR: at 15:17

## 2019-08-19 RX ADMIN — PROPOFOL 80 MCG/KG/MIN: 10 INJECTION, EMULSION INTRAVENOUS at 18:07

## 2019-08-19 RX ADMIN — MIDAZOLAM HYDROCHLORIDE 10 MG/HR: 5 INJECTION, SOLUTION INTRAMUSCULAR; INTRAVENOUS at 22:12

## 2019-08-19 RX ADMIN — IPRATROPIUM BROMIDE 4 PUFF: 17 AEROSOL, METERED RESPIRATORY (INHALATION) at 04:10

## 2019-08-19 RX ADMIN — ALBUTEROL SULFATE 4 PUFF: 90 AEROSOL, METERED RESPIRATORY (INHALATION) at 04:10

## 2019-08-19 RX ADMIN — MIDAZOLAM HYDROCHLORIDE 10 MG/HR: 5 INJECTION, SOLUTION INTRAMUSCULAR; INTRAVENOUS at 11:01

## 2019-08-19 RX ADMIN — CLONIDINE HYDROCHLORIDE 0.2 MG: 0.2 TABLET ORAL at 10:01

## 2019-08-19 RX ADMIN — PROPOFOL 80 MCG/KG/MIN: 10 INJECTION, EMULSION INTRAVENOUS at 15:23

## 2019-08-19 RX ADMIN — PROPOFOL 80 MCG/KG/MIN: 10 INJECTION, EMULSION INTRAVENOUS at 23:30

## 2019-08-19 RX ADMIN — Medication 200 MCG/HR: at 05:22

## 2019-08-19 RX ADMIN — CEFEPIME HYDROCHLORIDE 2 G: 2 INJECTION, POWDER, FOR SOLUTION INTRAVENOUS at 16:05

## 2019-08-19 RX ADMIN — PROPOFOL 80 MCG/KG/MIN: 10 INJECTION, EMULSION INTRAVENOUS at 21:23

## 2019-08-19 RX ADMIN — VANCOMYCIN HYDROCHLORIDE 1250 MG: 5 INJECTION, POWDER, LYOPHILIZED, FOR SOLUTION INTRAVENOUS at 14:22

## 2019-08-19 RX ADMIN — CHLORHEXIDINE GLUCONATE 0.12% ORAL RINSE 15 ML: 1.2 LIQUID ORAL at 10:01

## 2019-08-19 RX ADMIN — Medication 10 ML: at 20:36

## 2019-08-19 RX ADMIN — PROPOFOL 80 MCG/KG/MIN: 10 INJECTION, EMULSION INTRAVENOUS at 10:14

## 2019-08-19 RX ADMIN — LIDOCAINE HYDROCHLORIDE 5 ML: 10 INJECTION, SOLUTION EPIDURAL; INFILTRATION; INTRACAUDAL; PERINEURAL at 12:40

## 2019-08-19 RX ADMIN — PROPOFOL 80 MCG/KG/MIN: 10 INJECTION, EMULSION INTRAVENOUS at 05:10

## 2019-08-19 RX ADMIN — VANCOMYCIN HYDROCHLORIDE 1250 MG: 5 INJECTION, POWDER, LYOPHILIZED, FOR SOLUTION INTRAVENOUS at 01:55

## 2019-08-19 RX ADMIN — IPRATROPIUM BROMIDE 4 PUFF: 17 AEROSOL, METERED RESPIRATORY (INHALATION) at 16:06

## 2019-08-19 RX ADMIN — SODIUM CHLORIDE: 9 INJECTION, SOLUTION INTRAVENOUS at 19:34

## 2019-08-19 RX ADMIN — ENOXAPARIN SODIUM 40 MG: 100 INJECTION SUBCUTANEOUS at 10:02

## 2019-08-19 RX ADMIN — CHLORHEXIDINE GLUCONATE 0.12% ORAL RINSE 15 ML: 1.2 LIQUID ORAL at 20:26

## 2019-08-19 RX ADMIN — PROPOFOL 80 MCG/KG/MIN: 10 INJECTION, EMULSION INTRAVENOUS at 01:52

## 2019-08-19 RX ADMIN — IPRATROPIUM BROMIDE 4 PUFF: 17 AEROSOL, METERED RESPIRATORY (INHALATION) at 07:15

## 2019-08-19 RX ADMIN — ALBUTEROL SULFATE 4 PUFF: 90 AEROSOL, METERED RESPIRATORY (INHALATION) at 20:07

## 2019-08-19 RX ADMIN — ALBUTEROL SULFATE 4 PUFF: 90 AEROSOL, METERED RESPIRATORY (INHALATION) at 07:16

## 2019-08-19 RX ADMIN — Medication 10 ML: at 20:26

## 2019-08-19 RX ADMIN — PANTOPRAZOLE SODIUM 40 MG: 40 INJECTION, POWDER, FOR SOLUTION INTRAVENOUS at 10:01

## 2019-08-19 RX ADMIN — PROPOFOL 80 MCG/KG/MIN: 10 INJECTION, EMULSION INTRAVENOUS at 13:04

## 2019-08-19 RX ADMIN — IPRATROPIUM BROMIDE 4 PUFF: 17 AEROSOL, METERED RESPIRATORY (INHALATION) at 20:08

## 2019-08-19 RX ADMIN — Medication 200 MCG/HR: at 21:03

## 2019-08-19 RX ADMIN — ALBUTEROL SULFATE 4 PUFF: 90 AEROSOL, METERED RESPIRATORY (INHALATION) at 16:08

## 2019-08-19 ASSESSMENT — PULMONARY FUNCTION TESTS
PIF_VALUE: 24
PIF_VALUE: 22
PIF_VALUE: 24
PIF_VALUE: 25
PIF_VALUE: 22
PIF_VALUE: 22
PIF_VALUE: 36
PIF_VALUE: 24
PIF_VALUE: 22
PIF_VALUE: 23
PIF_VALUE: 23
PIF_VALUE: 21
PIF_VALUE: 22
PIF_VALUE: 23
PIF_VALUE: 25
PIF_VALUE: 24

## 2019-08-19 ASSESSMENT — PAIN SCALES - GENERAL
PAINLEVEL_OUTOF10: 0
PAINLEVEL_OUTOF10: 0

## 2019-08-19 NOTE — PROGRESS NOTES
Nutrition Assessment (Enteral Nutrition)    Type and Reason for Visit: Reassess    Nutrition Recommendations:   · Continue current EN and supplements as ordered    Nutrition Assessment: Pt continues on EN and now is on nimbex. Since Nimbex only paralizes skeletal muscles but not smooth muscle, it is still appropriate to feed the pt. Continue current EN and supplements orders. Malnutrition Assessment:  · Malnutrition Status:  At risk for malnutrition  · Context: Social or environmental circumstances    Nutrition Risk Level: High    Nutrition Needs:  · Estimated Daily Total Kcal: 2284 based on susan state 2003b  · Estimated Daily Protein (g): 122-162 based on 1.5-2 g/kg/IBW  · Estimated Daily Fluid (ml/day): 2284 based on 1 mL/kcal    Nutrition Diagnosis:   · Problem: Inadequate oral intake  · Etiology: related to Impaired respiratory function-inability to consume food     Signs and symptoms:  as evidenced by NPO status due to medical condition, Intubation    Objective Information:  · Wound Type: None  · Current Nutrition Therapies:  · Oral Diet Orders: NPO   · Oral Nutrition Supplement (ONS) Orders: Protein Modular, Other Oral Supplement (See Comment)(kefir)  · Tube Feeding (TF) Orders:   · Feeding Route: Nasogastric  · Formula: Low Calorie, High Protein  · Rate (ml/hr):40    · Volume (ml/day): 960  · Duration: Continuous  · Additives/Modulars: Protein(x2)  · Current TF & Flush Orders Provides: 1168 kcal and 136 g of protein  · Additional Calories: 1016 kcal from propofol  · Anthropometric Measures:  · Ht: 6' (182.9 cm)   · Current Body Wt: 177 lb (80.3 kg)  · Admission Body Wt: 177 lb (80.3 kg)  · Usual Body Wt: 170 lb (77.1 kg)  · Weight Change: none noted   · Ideal Body Wt: 178 lb (80.7 kg), % Ideal Body 99%  · BMI Classification: BMI 18.5 - 24.9 Normal Weight    Nutrition Interventions:   Continue current ONS, Continue current Tube Feeding  Continued Inpatient Monitoring, Education Not Indicated, Coordination of Care    Nutrition Evaluation:   · Evaluation: Progressing toward goals   · Goals: Pt will tolerate EN to receive greater than 75% of estimated needs   · Monitoring: Weight, Pertinent Labs, TF Intake, TF Tolerance, Monitor Hemodynamic Status      Electronically signed by Jose Carolina RD, LD on 7/16/04 at 10:18 AM    Contact Number: [phone]

## 2019-08-19 NOTE — PROGRESS NOTES
[facility]    Hospitalist Progress Note      Name:  Marcella Tillman /Age/Sex: 1990  (30 y.o. male)   MRN & CSN:  [id number] & [id number] Admission Date/Time: 2019 12:41 PM   Location:  -A PCP: No primary care provider on file. Hospital Day: 6    Assessment and Plan:   Marcella Tillman is a 30 y.o.  male with past medical history of drug abuse - multi-substance- who presents with unresponsiveness at home      # Unresponsiveness - Acute toxic encephalopathy     From multiple drug use and possible overdose - found unresponsive at home,   Given several doses of Narcan by EMS with minimal to no response -  Urine drug screen positive for cocaine, benzo, opiates, PCP - CT head unremarkable   Intubated in ER due to respiratory failure  Requiring high sedation to keep him calm and started on Nimbex (paralytic) on      # Acute hypoxic respiratory failure     Intubated on  in ED- Vent management per Pulmonary   Right lower lobe collapse likely from mucus plug- may need Bronch  Paralytic managed by pulmonary      # Severe Agitation and restlessness     Likely from withdrawal of various drugs   Maxed out on all propofol, Versed and fentanyl - also on physical restraints   Nimbex started per Pulmonary on      # Aspiration pneumonia? >>VAP    Initially treated for Aspiration now developing VAP   Patient reportedly vomited several times at home before admission-  Changed Unasyn to Vancomycin and Cefepime on    Blood culture verbal report was NGTD so will place PICC today      # Multi-substance abuse     Patient had repeated rehab admissions with repeated relapses   May have to start on COWS protocol post extubation - Nicoderm patch     No family present at the bedside     Diet DIET TUBE FEED CONTINUOUS/CYCLIC NPO; Low Calorie High Protein (Vital HP);  Nasogastric; Continuous; 25; 40; 24  Dietary Nutrition Supplements: Protein Modular  Dietary Nutrition Supplements: Other Oral Supplement (see comment)   DVT Prophylaxis [x] Lovenox, []  Heparin, [] SCDs, []No VTE prophylaxis, patient ambulating   GI Prophylaxis [] PPI, [] H2 Blocker, [] No GI prophylaxis, patient is receiving diet/Tube Feeds   Code Status Full Code   Disposition Patient requires continued admission due to Overdose    MDM [] Low, [] Moderate,[x]  High     History of Present Illness: Subjective     Patient Seen & Examined at the bedside      Sedated and paralyzed   reportedly was off Nimbex last night and was very agitated and aggressive again requiring restart of Nimbex     Ten point ROS reviewed negative, unless as noted above    Objective: Intake/Output Summary (Last 24 hours) at 8/19/2019 0817  Last data filed at 8/19/2019 0603  Gross per 24 hour   Intake 7951.87 ml   Output 5500 ml   Net 2451.87 ml      Vitals:   Vitals:    08/19/19 0700   BP: 134/81   Pulse: 87   Resp: 16   Temp:    SpO2: 98%     Physical Exam:    GEN Intubated and sedated and paralyzed   HENT ETT and NGT in place   RESP Decreased air entry on auscultation, no wheezes, rales or rhonchi. CARDIO/VASC -S1/S2 auscultated. Regular rate without appreciable murmurs, rubs, or gallops. Peripheral pulses equal bilaterally and palpable. No peripheral edema. GI Abdomen is soft without significant tenderness, masses, or guarding. Bowel sounds are normoactive. Rectal exam deferred.  Polk catheter is present. MSK No gross joint deformities. Spontaneous movement of all extremities  SKIN Normal coloration, warm, dry.  Several tattoo marks   NEURO Intubated and sedated and paralyzed with Nimbex     Medications:   Medications:    potassium phosphate IVPB  20 mmol Intravenous Once    lidocaine PF  5 mL Intradermal Once    sodium chloride flush  10 mL Intravenous 2 times per day    cefepime  2 g Intravenous Q12H    vancomycin  1,250 mg Intravenous Q8H    cloNIDine  0.2 mg Per NG tube BID    propofol  10 mcg/kg/min Intravenous Once    nicotine  1

## 2019-08-20 ENCOUNTER — APPOINTMENT (OUTPATIENT)
Dept: GENERAL RADIOLOGY | Age: 29
DRG: 917 | End: 2019-08-20

## 2019-08-20 LAB
ANION GAP SERPL CALCULATED.3IONS-SCNC: 11 MMOL/L (ref 4–16)
BASE EXCESS MIXED: ABNORMAL (ref 0–1.2)
BUN BLDV-MCNC: 10 MG/DL (ref 6–23)
CALCIUM SERPL-MCNC: 9.5 MG/DL (ref 8.3–10.6)
CARBON MONOXIDE, BLOOD: 1.9 % (ref 0–5)
CHLORIDE BLD-SCNC: 103 MMOL/L (ref 99–110)
CO2 CONTENT: 33.3 MMOL/L (ref 19–24)
CO2: 28 MMOL/L (ref 21–32)
CREAT SERPL-MCNC: 0.6 MG/DL (ref 0.9–1.3)
GFR AFRICAN AMERICAN: >60 ML/MIN/1.73M2
GFR NON-AFRICAN AMERICAN: >60 ML/MIN/1.73M2
GLUCOSE BLD-MCNC: 121 MG/DL (ref 70–99)
HCO3 ARTERIAL: 31.6 MMOL/L (ref 18–23)
HCT VFR BLD CALC: 45.9 % (ref 42–52)
HEMOGLOBIN: 14.6 GM/DL (ref 13.5–18)
MAGNESIUM: 1.9 MG/DL (ref 1.8–2.4)
MCH RBC QN AUTO: 30.2 PG (ref 27–31)
MCHC RBC AUTO-ENTMCNC: 31.8 % (ref 32–36)
MCV RBC AUTO: 94.8 FL (ref 78–100)
METHEMOGLOBIN ARTERIAL: 1.2 %
O2 SATURATION: 88.1 % (ref 96–97)
PCO2 ARTERIAL: 56 MMHG (ref 32–45)
PDW BLD-RTO: 11.9 % (ref 11.7–14.9)
PH BLOOD: 7.36 (ref 7.34–7.45)
PHOSPHORUS: 3.7 MG/DL (ref 2.5–4.9)
PLATELET # BLD: 251 K/CU MM (ref 140–440)
PMV BLD AUTO: 9.7 FL (ref 7.5–11.1)
PO2 ARTERIAL: 55 MMHG (ref 75–100)
POTASSIUM SERPL-SCNC: 4.5 MMOL/L (ref 3.5–5.1)
PROCALCITONIN: 0.1
RBC # BLD: 4.84 M/CU MM (ref 4.6–6.2)
SODIUM BLD-SCNC: 142 MMOL/L (ref 135–145)
WBC # BLD: 14.9 K/CU MM (ref 4–10.5)

## 2019-08-20 PROCEDURE — 2580000003 HC RX 258: Performed by: INTERNAL MEDICINE

## 2019-08-20 PROCEDURE — 71045 X-RAY EXAM CHEST 1 VIEW: CPT

## 2019-08-20 PROCEDURE — 84100 ASSAY OF PHOSPHORUS: CPT

## 2019-08-20 PROCEDURE — 82803 BLOOD GASES ANY COMBINATION: CPT

## 2019-08-20 PROCEDURE — 94750 HC PULMONARY COMPLIANCE STUDY: CPT

## 2019-08-20 PROCEDURE — 36600 WITHDRAWAL OF ARTERIAL BLOOD: CPT

## 2019-08-20 PROCEDURE — 94761 N-INVAS EAR/PLS OXIMETRY MLT: CPT

## 2019-08-20 PROCEDURE — 36592 COLLECT BLOOD FROM PICC: CPT

## 2019-08-20 PROCEDURE — C9113 INJ PANTOPRAZOLE SODIUM, VIA: HCPCS | Performed by: INTERNAL MEDICINE

## 2019-08-20 PROCEDURE — 6370000000 HC RX 637 (ALT 250 FOR IP): Performed by: INTERNAL MEDICINE

## 2019-08-20 PROCEDURE — 2000000000 HC ICU R&B

## 2019-08-20 PROCEDURE — 6360000002 HC RX W HCPCS: Performed by: INTERNAL MEDICINE

## 2019-08-20 PROCEDURE — 83735 ASSAY OF MAGNESIUM: CPT

## 2019-08-20 PROCEDURE — 2700000000 HC OXYGEN THERAPY PER DAY

## 2019-08-20 PROCEDURE — 94640 AIRWAY INHALATION TREATMENT: CPT

## 2019-08-20 PROCEDURE — 94668 MNPJ CHEST WALL SBSQ: CPT

## 2019-08-20 PROCEDURE — 84145 PROCALCITONIN (PCT): CPT

## 2019-08-20 PROCEDURE — 89220 SPUTUM SPECIMEN COLLECTION: CPT

## 2019-08-20 PROCEDURE — 94003 VENT MGMT INPAT SUBQ DAY: CPT

## 2019-08-20 PROCEDURE — 2500000003 HC RX 250 WO HCPCS: Performed by: INTERNAL MEDICINE

## 2019-08-20 PROCEDURE — 85027 COMPLETE CBC AUTOMATED: CPT

## 2019-08-20 PROCEDURE — 80048 BASIC METABOLIC PNL TOTAL CA: CPT

## 2019-08-20 PROCEDURE — 94667 MNPJ CHEST WALL 1ST: CPT

## 2019-08-20 RX ORDER — AMLODIPINE BESYLATE 5 MG/1
5 TABLET ORAL DAILY
Status: DISCONTINUED | OUTPATIENT
Start: 2019-08-20 | End: 2019-08-20

## 2019-08-20 RX ORDER — ACETYLCYSTEINE 100 MG/ML
4 SOLUTION ORAL; RESPIRATORY (INHALATION) 3 TIMES DAILY
Status: DISCONTINUED | OUTPATIENT
Start: 2019-08-20 | End: 2019-08-26

## 2019-08-20 RX ORDER — LISINOPRIL 5 MG/1
5 TABLET ORAL DAILY
Status: DISCONTINUED | OUTPATIENT
Start: 2019-08-20 | End: 2019-08-28 | Stop reason: HOSPADM

## 2019-08-20 RX ORDER — IPRATROPIUM BROMIDE AND ALBUTEROL SULFATE 2.5; .5 MG/3ML; MG/3ML
1 SOLUTION RESPIRATORY (INHALATION) EVERY 4 HOURS
Status: DISCONTINUED | OUTPATIENT
Start: 2019-08-20 | End: 2019-08-26

## 2019-08-20 RX ADMIN — IPRATROPIUM BROMIDE 4 PUFF: 17 AEROSOL, METERED RESPIRATORY (INHALATION) at 04:16

## 2019-08-20 RX ADMIN — IPRATROPIUM BROMIDE AND ALBUTEROL SULFATE 1 AMPULE: .5; 3 SOLUTION RESPIRATORY (INHALATION) at 11:19

## 2019-08-20 RX ADMIN — PROPOFOL 80 MCG/KG/MIN: 10 INJECTION, EMULSION INTRAVENOUS at 07:33

## 2019-08-20 RX ADMIN — ACETYLCYSTEINE 400 MG: 100 SOLUTION ORAL; RESPIRATORY (INHALATION) at 20:05

## 2019-08-20 RX ADMIN — PROPOFOL 80 MCG/KG/MIN: 10 INJECTION, EMULSION INTRAVENOUS at 04:45

## 2019-08-20 RX ADMIN — CHLORHEXIDINE GLUCONATE 0.12% ORAL RINSE 15 ML: 1.2 LIQUID ORAL at 20:13

## 2019-08-20 RX ADMIN — MIDAZOLAM HYDROCHLORIDE 10 MG/HR: 5 INJECTION, SOLUTION INTRAMUSCULAR; INTRAVENOUS at 09:06

## 2019-08-20 RX ADMIN — Medication 200 MCG/HR: at 12:52

## 2019-08-20 RX ADMIN — Medication 200 MCG/HR: at 02:10

## 2019-08-20 RX ADMIN — PROPOFOL 80 MCG/KG/MIN: 10 INJECTION, EMULSION INTRAVENOUS at 02:10

## 2019-08-20 RX ADMIN — PANTOPRAZOLE SODIUM 40 MG: 40 INJECTION, POWDER, FOR SOLUTION INTRAVENOUS at 08:54

## 2019-08-20 RX ADMIN — MIDAZOLAM HYDROCHLORIDE 10 MG/HR: 5 INJECTION, SOLUTION INTRAMUSCULAR; INTRAVENOUS at 20:20

## 2019-08-20 RX ADMIN — PROPOFOL 80 MCG/KG/MIN: 10 INJECTION, EMULSION INTRAVENOUS at 15:28

## 2019-08-20 RX ADMIN — Medication 200 MCG/HR: at 07:45

## 2019-08-20 RX ADMIN — PROPOFOL 80 MCG/KG/MIN: 10 INJECTION, EMULSION INTRAVENOUS at 10:16

## 2019-08-20 RX ADMIN — ACETYLCYSTEINE 400 MG: 100 SOLUTION ORAL; RESPIRATORY (INHALATION) at 11:20

## 2019-08-20 RX ADMIN — CEFEPIME HYDROCHLORIDE 2 G: 2 INJECTION, POWDER, FOR SOLUTION INTRAVENOUS at 16:03

## 2019-08-20 RX ADMIN — PROPOFOL 80 MCG/KG/MIN: 10 INJECTION, EMULSION INTRAVENOUS at 22:53

## 2019-08-20 RX ADMIN — CEFEPIME HYDROCHLORIDE 2 G: 2 INJECTION, POWDER, FOR SOLUTION INTRAVENOUS at 02:14

## 2019-08-20 RX ADMIN — Medication 200 MCG/HR: at 23:52

## 2019-08-20 RX ADMIN — SODIUM CHLORIDE 3.5 MCG/KG/MIN: 9 INJECTION, SOLUTION INTRAVENOUS at 05:56

## 2019-08-20 RX ADMIN — IPRATROPIUM BROMIDE AND ALBUTEROL SULFATE 1 AMPULE: .5; 3 SOLUTION RESPIRATORY (INHALATION) at 07:26

## 2019-08-20 RX ADMIN — Medication 10 ML: at 20:13

## 2019-08-20 RX ADMIN — Medication 10 ML: at 20:14

## 2019-08-20 RX ADMIN — SODIUM CHLORIDE: 9 INJECTION, SOLUTION INTRAVENOUS at 23:02

## 2019-08-20 RX ADMIN — IPRATROPIUM BROMIDE 4 PUFF: 17 AEROSOL, METERED RESPIRATORY (INHALATION) at 00:04

## 2019-08-20 RX ADMIN — CHLORHEXIDINE GLUCONATE 0.12% ORAL RINSE 15 ML: 1.2 LIQUID ORAL at 08:54

## 2019-08-20 RX ADMIN — PROPOFOL 80 MCG/KG/MIN: 10 INJECTION, EMULSION INTRAVENOUS at 17:50

## 2019-08-20 RX ADMIN — VANCOMYCIN HYDROCHLORIDE 1500 MG: 5 INJECTION, POWDER, LYOPHILIZED, FOR SOLUTION INTRAVENOUS at 05:48

## 2019-08-20 RX ADMIN — IPRATROPIUM BROMIDE AND ALBUTEROL SULFATE 1 AMPULE: .5; 3 SOLUTION RESPIRATORY (INHALATION) at 20:03

## 2019-08-20 RX ADMIN — SODIUM CHLORIDE: 9 INJECTION, SOLUTION INTRAVENOUS at 09:42

## 2019-08-20 RX ADMIN — IPRATROPIUM BROMIDE AND ALBUTEROL SULFATE 1 AMPULE: .5; 3 SOLUTION RESPIRATORY (INHALATION) at 15:19

## 2019-08-20 RX ADMIN — ENOXAPARIN SODIUM 40 MG: 100 INJECTION SUBCUTANEOUS at 08:54

## 2019-08-20 RX ADMIN — Medication 200 MCG/HR: at 18:26

## 2019-08-20 RX ADMIN — CLONIDINE HYDROCHLORIDE 0.2 MG: 0.2 TABLET ORAL at 20:13

## 2019-08-20 RX ADMIN — LISINOPRIL 5 MG: 5 TABLET ORAL at 10:59

## 2019-08-20 RX ADMIN — ALBUTEROL SULFATE 4 PUFF: 90 AEROSOL, METERED RESPIRATORY (INHALATION) at 04:16

## 2019-08-20 RX ADMIN — CLONIDINE HYDROCHLORIDE 0.2 MG: 0.2 TABLET ORAL at 07:52

## 2019-08-20 RX ADMIN — ALBUTEROL SULFATE 4 PUFF: 90 AEROSOL, METERED RESPIRATORY (INHALATION) at 00:03

## 2019-08-20 RX ADMIN — PROPOFOL 80 MCG/KG/MIN: 10 INJECTION, EMULSION INTRAVENOUS at 20:56

## 2019-08-20 RX ADMIN — Medication 10 ML: at 08:55

## 2019-08-20 RX ADMIN — SODIUM CHLORIDE 2 MCG/KG/MIN: 9 INJECTION, SOLUTION INTRAVENOUS at 16:18

## 2019-08-20 ASSESSMENT — PULMONARY FUNCTION TESTS
PIF_VALUE: 27
PIF_VALUE: 24
PIF_VALUE: 30
PIF_VALUE: 26
PIF_VALUE: 27
PIF_VALUE: 25
PIF_VALUE: 28
PIF_VALUE: 45
PIF_VALUE: 24
PIF_VALUE: 29
PIF_VALUE: 37
PIF_VALUE: 27
PIF_VALUE: 25
PIF_VALUE: 28
PIF_VALUE: 27
PIF_VALUE: 31
PIF_VALUE: 26
PIF_VALUE: 31

## 2019-08-20 NOTE — PROGRESS NOTES
[address] [facility]  consulted by Dr. Ernie Emmanuel for monitoring and adjustment. Indication for treatment: Possible aspiration Pneumonia  Goal trough: 15-20 mcg/mL  Other Antimicrobials:  Cefepime       AGE = 30 y.o.  SEX = male  HEIGHT = 6' (1.829 m)  Wt Readings from Last 3 Encounters:   08/19/19 200 lb 6.4 oz (90.9 kg)   10/20/18 170 lb (77.1 kg)   06/25/17 170 lb (77.1 kg)          Pertinent Laboratory Values:  TROUGH:    Recent Labs     08/19/19  1315   VANCOTROUGH 7.7*     RANDOM:  No results for input(s): VANCORANDOM in the last 72 hours. Estimated Creatinine Clearance: 201 mL/min (A) (based on SCr of 0.6 mg/dL (L)).     Recent Labs     08/18/19  0600 08/19/19  0532 08/20/19  0550   WBC 14.8* 20.5* 14.9*   BUN 10 18 10   CREATININE 0.7* 0.3* 0.6*     WBC   Date Value Ref Range Status   08/20/2019 14.9 (H) 4.0 - 10.5 K/CU MM Final     Lactate   Date Value Ref Range Status   08/14/2019 1.4 0.4 - 2.0 mMOL/L Final   08/24/2016 1.5 0.4 - 2.0 mMOL/L Final   08/23/2016 0.9 0.4 - 2.0 mMOL/L Final     Temp Readings from Last 3 Encounters:   08/20/19 99.3 °F (37.4 °C) (Rectal)   10/20/18 97.8 °F (36.6 °C) (Oral)   06/25/17 97.2 °F (36.2 °C) (Oral)       Intake/Output Summary (Last 24 hours) at 8/20/2019 1138  Last data filed at 8/20/2019 0551  Gross per 24 hour   Intake 5716.3 ml   Output 6500 ml   Net -783.7 ml       Pertinent Cultures:  Date    Source    Results  8/17   Blood    Pending  8/18   MRSA screen   Negative  8/17                   Sputum    Staph aureus      Assessment:  WBC and temperature: WBC trending down today @ 14.9;  pt febrile @ 99.3  SCr, BUN, and urine output: SCR normal, good UOP  Day(s) of therapy:  3  Vancomycin level: 7.7  subtherapeutic (8/19)    Plan:  Continue Vancomcyin 1,500mg IVPB every 8 hours  Repeat Trough level due today @ 2200  Pharmacy will continue to monitor patient and adjust therapy as indicated    VANCOMYCIN TROUGH SCHEDULED FOR 8/20 @ 2373    Thank you for the consult.   Tucker Magaña [place]  8/20/2019  11:38 AM

## 2019-08-20 NOTE — PROGRESS NOTES
Pulmonary and Critical Care  Progress Note    Subjective: The patient is still agitated off sedation. Having thick secretions. Shortness of breath none. Chest pain none. Addressing respiratory complaints Patient is negative for  hemoptysis and cyanosis  CONSTITUTIONAL:  negative for fevers and chills      Past Medical History:     has a past medical history of Hypertension and Tourette's.   has a past surgical history that includes Arm Surgery (Left, 08/23/2016). reports that he has been smoking cigarettes. He has been smoking about 1.00 pack per day. He has never used smokeless tobacco. He reports that he does not drink alcohol or use drugs. Family history:  family history is not on file. No Known Allergies  Social History:    Reviewed; no changes    Objective:   PHYSICAL EXAM:        VITALS:  BP (!) 184/96 Comment: catapres admin  Pulse 87   Temp 99.9 °F (37.7 °C) (Rectal)   Resp 16   Ht 6' (1.829 m)   Wt 200 lb 6.4 oz (90.9 kg)   SpO2 94%   BMI 27.18 kg/m²     24HR INTAKE/OUTPUT:      Intake/Output Summary (Last 24 hours) at 8/20/2019 1027  Last data filed at 8/20/2019 0551  Gross per 24 hour   Intake 5716.3 ml   Output 6500 ml   Net -783.7 ml       CONSTITUTIONAL: Sedated on vent. LUNGS:  decreased breath sounds, occ basilar crackles. CARDIOVASCULAR:  normal S1 and S2 and negative JVD  ABD:Abdomen soft, non-tender.  BS normal. No masses,  No organomegaly  DATA:    CBC:  Recent Labs     08/18/19  0600 08/19/19  0532 08/20/19  0550   WBC 14.8* 20.5* 14.9*   RBC 4.52* 4.17* 4.84   HGB 13.9 12.6* 14.6   HCT 44.0 38.1* 45.9    133* 251   MCV 97.3 91.4 94.8   MCH 30.8 30.2 30.2   MCHC 31.6* 33.1 31.8*   RDW 12.4 14.5 11.9      BMP:  Recent Labs     08/18/19  0600 08/19/19  0532 08/19/19  1645 08/20/19  0550    135  --  142   K 4.5 3.1* 4.5 4.5    100  --  103   CO2 31 28  --  28   BUN 10 18  --  10   CREATININE 0.7* 0.3*  --  0.6*   CALCIUM 9.3 8.6  --  9.5   GLUCOSE 101* 115*  --

## 2019-08-21 ENCOUNTER — APPOINTMENT (OUTPATIENT)
Dept: GENERAL RADIOLOGY | Age: 29
DRG: 917 | End: 2019-08-21

## 2019-08-21 LAB
ANION GAP SERPL CALCULATED.3IONS-SCNC: 11 MMOL/L (ref 4–16)
BASE EXCESS MIXED: ABNORMAL (ref 0–1.2)
BUN BLDV-MCNC: 14 MG/DL (ref 6–23)
CALCIUM SERPL-MCNC: 9.4 MG/DL (ref 8.3–10.6)
CARBON MONOXIDE, BLOOD: 2.5 % (ref 0–5)
CHLORIDE BLD-SCNC: 103 MMOL/L (ref 99–110)
CO2 CONTENT: 31.2 MMOL/L (ref 19–24)
CO2: 28 MMOL/L (ref 21–32)
COMMENT: ABNORMAL
CREAT SERPL-MCNC: 0.6 MG/DL (ref 0.9–1.3)
GFR AFRICAN AMERICAN: >60 ML/MIN/1.73M2
GFR NON-AFRICAN AMERICAN: >60 ML/MIN/1.73M2
GLUCOSE BLD-MCNC: 136 MG/DL (ref 70–99)
HCO3 ARTERIAL: 29.8 MMOL/L (ref 18–23)
HCT VFR BLD CALC: 42.3 % (ref 42–52)
HCT VFR BLD CALC: 45.8 % (ref 42–52)
HEMOGLOBIN: 13.6 GM/DL (ref 13.5–18)
HEMOGLOBIN: 14.6 GM/DL (ref 13.5–18)
MAGNESIUM: 1.9 MG/DL (ref 1.8–2.4)
MCH RBC QN AUTO: 30.2 PG (ref 27–31)
MCH RBC QN AUTO: 30.3 PG (ref 27–31)
MCHC RBC AUTO-ENTMCNC: 31.9 % (ref 32–36)
MCHC RBC AUTO-ENTMCNC: 32.2 % (ref 32–36)
MCV RBC AUTO: 94.2 FL (ref 78–100)
MCV RBC AUTO: 94.8 FL (ref 78–100)
METHEMOGLOBIN ARTERIAL: 1.4 %
O2 SATURATION: 92.4 % (ref 96–97)
PCO2 ARTERIAL: 46 MMHG (ref 32–45)
PDW BLD-RTO: 11.9 % (ref 11.7–14.9)
PDW BLD-RTO: 11.9 % (ref 11.7–14.9)
PH BLOOD: 7.42 (ref 7.34–7.45)
PHOSPHORUS: 3.5 MG/DL (ref 2.5–4.9)
PLATELET # BLD: 283 K/CU MM (ref 140–440)
PLATELET # BLD: 300 K/CU MM (ref 140–440)
PMV BLD AUTO: 9.5 FL (ref 7.5–11.1)
PMV BLD AUTO: 9.6 FL (ref 7.5–11.1)
PO2 ARTERIAL: 65 MMHG (ref 75–100)
POTASSIUM SERPL-SCNC: 4.2 MMOL/L (ref 3.5–5.1)
RBC # BLD: 4.49 M/CU MM (ref 4.6–6.2)
RBC # BLD: 4.83 M/CU MM (ref 4.6–6.2)
SODIUM BLD-SCNC: 142 MMOL/L (ref 135–145)
WBC # BLD: 20 K/CU MM (ref 4–10.5)
WBC # BLD: 22.6 K/CU MM (ref 4–10.5)

## 2019-08-21 PROCEDURE — 83735 ASSAY OF MAGNESIUM: CPT

## 2019-08-21 PROCEDURE — 94761 N-INVAS EAR/PLS OXIMETRY MLT: CPT

## 2019-08-21 PROCEDURE — 6370000000 HC RX 637 (ALT 250 FOR IP): Performed by: INTERNAL MEDICINE

## 2019-08-21 PROCEDURE — 51702 INSERT TEMP BLADDER CATH: CPT

## 2019-08-21 PROCEDURE — 87205 SMEAR GRAM STAIN: CPT

## 2019-08-21 PROCEDURE — 2580000003 HC RX 258: Performed by: INTERNAL MEDICINE

## 2019-08-21 PROCEDURE — 2000000000 HC ICU R&B

## 2019-08-21 PROCEDURE — 36592 COLLECT BLOOD FROM PICC: CPT

## 2019-08-21 PROCEDURE — 2500000003 HC RX 250 WO HCPCS: Performed by: INTERNAL MEDICINE

## 2019-08-21 PROCEDURE — 71045 X-RAY EXAM CHEST 1 VIEW: CPT

## 2019-08-21 PROCEDURE — 87077 CULTURE AEROBIC IDENTIFY: CPT

## 2019-08-21 PROCEDURE — 87070 CULTURE OTHR SPECIMN AEROBIC: CPT

## 2019-08-21 PROCEDURE — 80048 BASIC METABOLIC PNL TOTAL CA: CPT

## 2019-08-21 PROCEDURE — 82803 BLOOD GASES ANY COMBINATION: CPT

## 2019-08-21 PROCEDURE — 87147 CULTURE TYPE IMMUNOLOGIC: CPT

## 2019-08-21 PROCEDURE — 2700000000 HC OXYGEN THERAPY PER DAY

## 2019-08-21 PROCEDURE — 84100 ASSAY OF PHOSPHORUS: CPT

## 2019-08-21 PROCEDURE — 6360000002 HC RX W HCPCS: Performed by: INTERNAL MEDICINE

## 2019-08-21 PROCEDURE — 85027 COMPLETE CBC AUTOMATED: CPT

## 2019-08-21 PROCEDURE — 94640 AIRWAY INHALATION TREATMENT: CPT

## 2019-08-21 PROCEDURE — 89220 SPUTUM SPECIMEN COLLECTION: CPT

## 2019-08-21 PROCEDURE — 94668 MNPJ CHEST WALL SBSQ: CPT

## 2019-08-21 PROCEDURE — 87186 SC STD MICRODIL/AGAR DIL: CPT

## 2019-08-21 PROCEDURE — 94669 MECHANICAL CHEST WALL OSCILL: CPT

## 2019-08-21 PROCEDURE — 36600 WITHDRAWAL OF ARTERIAL BLOOD: CPT

## 2019-08-21 PROCEDURE — C9113 INJ PANTOPRAZOLE SODIUM, VIA: HCPCS | Performed by: INTERNAL MEDICINE

## 2019-08-21 PROCEDURE — 94003 VENT MGMT INPAT SUBQ DAY: CPT

## 2019-08-21 PROCEDURE — 87040 BLOOD CULTURE FOR BACTERIA: CPT

## 2019-08-21 RX ADMIN — PROPOFOL 80 MCG/KG/MIN: 10 INJECTION, EMULSION INTRAVENOUS at 20:37

## 2019-08-21 RX ADMIN — VANCOMYCIN HYDROCHLORIDE 1500 MG: 5 INJECTION, POWDER, LYOPHILIZED, FOR SOLUTION INTRAVENOUS at 19:53

## 2019-08-21 RX ADMIN — CLONIDINE HYDROCHLORIDE 0.2 MG: 0.2 TABLET ORAL at 19:53

## 2019-08-21 RX ADMIN — Medication 10 ML: at 09:47

## 2019-08-21 RX ADMIN — SODIUM CHLORIDE 3.5 MCG/KG/MIN: 9 INJECTION, SOLUTION INTRAVENOUS at 15:38

## 2019-08-21 RX ADMIN — PROPOFOL 80 MCG/KG/MIN: 10 INJECTION, EMULSION INTRAVENOUS at 14:52

## 2019-08-21 RX ADMIN — VANCOMYCIN HYDROCHLORIDE 1500 MG: 5 INJECTION, POWDER, LYOPHILIZED, FOR SOLUTION INTRAVENOUS at 12:36

## 2019-08-21 RX ADMIN — CEFEPIME HYDROCHLORIDE 2 G: 2 INJECTION, POWDER, FOR SOLUTION INTRAVENOUS at 03:33

## 2019-08-21 RX ADMIN — IPRATROPIUM BROMIDE AND ALBUTEROL SULFATE 1 AMPULE: .5; 3 SOLUTION RESPIRATORY (INHALATION) at 08:34

## 2019-08-21 RX ADMIN — CLONIDINE HYDROCHLORIDE 0.2 MG: 0.2 TABLET ORAL at 09:46

## 2019-08-21 RX ADMIN — IPRATROPIUM BROMIDE AND ALBUTEROL SULFATE 1 AMPULE: .5; 3 SOLUTION RESPIRATORY (INHALATION) at 23:51

## 2019-08-21 RX ADMIN — PROPOFOL 80 MCG/KG/MIN: 10 INJECTION, EMULSION INTRAVENOUS at 10:03

## 2019-08-21 RX ADMIN — PROPOFOL 80 MCG/KG/MIN: 10 INJECTION, EMULSION INTRAVENOUS at 17:45

## 2019-08-21 RX ADMIN — MIDAZOLAM HYDROCHLORIDE 10 MG/HR: 5 INJECTION, SOLUTION INTRAMUSCULAR; INTRAVENOUS at 15:39

## 2019-08-21 RX ADMIN — SODIUM CHLORIDE: 9 INJECTION, SOLUTION INTRAVENOUS at 23:17

## 2019-08-21 RX ADMIN — PROPOFOL 80 MCG/KG/MIN: 10 INJECTION, EMULSION INTRAVENOUS at 23:16

## 2019-08-21 RX ADMIN — PROPOFOL 80 MCG/KG/MIN: 10 INJECTION, EMULSION INTRAVENOUS at 12:33

## 2019-08-21 RX ADMIN — ACETAMINOPHEN 650 MG: 650 SUPPOSITORY RECTAL at 19:53

## 2019-08-21 RX ADMIN — CHLORHEXIDINE GLUCONATE 0.12% ORAL RINSE 15 ML: 1.2 LIQUID ORAL at 09:49

## 2019-08-21 RX ADMIN — ACETAMINOPHEN 650 MG: 650 SUPPOSITORY RECTAL at 05:43

## 2019-08-21 RX ADMIN — IPRATROPIUM BROMIDE AND ALBUTEROL SULFATE 1 AMPULE: .5; 3 SOLUTION RESPIRATORY (INHALATION) at 12:21

## 2019-08-21 RX ADMIN — IPRATROPIUM BROMIDE AND ALBUTEROL SULFATE 1 AMPULE: .5; 3 SOLUTION RESPIRATORY (INHALATION) at 04:44

## 2019-08-21 RX ADMIN — MIDAZOLAM HYDROCHLORIDE 10 MG/HR: 5 INJECTION, SOLUTION INTRAMUSCULAR; INTRAVENOUS at 06:38

## 2019-08-21 RX ADMIN — IPRATROPIUM BROMIDE AND ALBUTEROL SULFATE 1 AMPULE: .5; 3 SOLUTION RESPIRATORY (INHALATION) at 20:00

## 2019-08-21 RX ADMIN — PANTOPRAZOLE SODIUM 40 MG: 40 INJECTION, POWDER, FOR SOLUTION INTRAVENOUS at 09:47

## 2019-08-21 RX ADMIN — Medication 200 MCG/HR: at 05:43

## 2019-08-21 RX ADMIN — PROPOFOL 80 MCG/KG/MIN: 10 INJECTION, EMULSION INTRAVENOUS at 01:46

## 2019-08-21 RX ADMIN — LISINOPRIL 5 MG: 5 TABLET ORAL at 09:46

## 2019-08-21 RX ADMIN — ENOXAPARIN SODIUM 40 MG: 100 INJECTION SUBCUTANEOUS at 09:46

## 2019-08-21 RX ADMIN — IPRATROPIUM BROMIDE AND ALBUTEROL SULFATE 1 AMPULE: .5; 3 SOLUTION RESPIRATORY (INHALATION) at 15:21

## 2019-08-21 RX ADMIN — CEFEPIME HYDROCHLORIDE 2 G: 2 INJECTION, POWDER, FOR SOLUTION INTRAVENOUS at 14:47

## 2019-08-21 RX ADMIN — CHLORHEXIDINE GLUCONATE 0.12% ORAL RINSE 15 ML: 1.2 LIQUID ORAL at 19:53

## 2019-08-21 RX ADMIN — ACETYLCYSTEINE 400 MG: 100 SOLUTION ORAL; RESPIRATORY (INHALATION) at 15:24

## 2019-08-21 RX ADMIN — Medication 200 MCG/HR: at 15:41

## 2019-08-21 RX ADMIN — Medication 10 ML: at 09:48

## 2019-08-21 RX ADMIN — IPRATROPIUM BROMIDE AND ALBUTEROL SULFATE 1 AMPULE: .5; 3 SOLUTION RESPIRATORY (INHALATION) at 00:06

## 2019-08-21 RX ADMIN — SODIUM CHLORIDE 3.5 MCG/KG/MIN: 9 INJECTION, SOLUTION INTRAVENOUS at 04:43

## 2019-08-21 RX ADMIN — Medication 200 MCG/HR: at 20:37

## 2019-08-21 RX ADMIN — Medication 10 ML: at 09:49

## 2019-08-21 RX ADMIN — PROPOFOL 80 MCG/KG/MIN: 10 INJECTION, EMULSION INTRAVENOUS at 04:21

## 2019-08-21 RX ADMIN — PROPOFOL 80 MCG/KG/MIN: 10 INJECTION, EMULSION INTRAVENOUS at 07:01

## 2019-08-21 RX ADMIN — ACETYLCYSTEINE 400 MG: 100 SOLUTION ORAL; RESPIRATORY (INHALATION) at 08:34

## 2019-08-21 RX ADMIN — Medication 200 MCG/HR: at 11:37

## 2019-08-21 RX ADMIN — SODIUM CHLORIDE: 9 INJECTION, SOLUTION INTRAVENOUS at 10:30

## 2019-08-21 ASSESSMENT — PAIN SCALES - GENERAL
PAINLEVEL_OUTOF10: 0

## 2019-08-21 ASSESSMENT — PULMONARY FUNCTION TESTS
PIF_VALUE: 26
PIF_VALUE: 27
PIF_VALUE: 25
PIF_VALUE: 26
PIF_VALUE: 25
PIF_VALUE: 28
PIF_VALUE: 27
PIF_VALUE: 40
PIF_VALUE: 26
PIF_VALUE: 26
PIF_VALUE: 25
PIF_VALUE: 25
PIF_VALUE: 26
PIF_VALUE: 27
PIF_VALUE: 24
PIF_VALUE: 26
PIF_VALUE: 25
PIF_VALUE: 25
PIF_VALUE: 27
PIF_VALUE: 27
PIF_VALUE: 39
PIF_VALUE: 25
PIF_VALUE: 26
PIF_VALUE: 27
PIF_VALUE: 25
PIF_VALUE: 24

## 2019-08-21 NOTE — PROGRESS NOTES
[address] [facility]  consulted by Dr. Darlin Chester for monitoring and adjustment. Indication for Treatment: VAP  OTHER Antimicrobials:  Cefepime   Goal Trough: 15 -20 mcg/ml    AGE = 29 y.o.  SEX = male  HEIGHT = 6' (1.829 m)  Wt Readings from Last 3 Encounters:   08/19/19 200 lb 6.4 oz (90.9 kg)   10/20/18 170 lb (77.1 kg)   06/25/17 170 lb (77.1 kg)          Pertinent Laboratory Values:  TROUGH:    Recent Labs     08/19/19  1315   VANCOTROUGH 7.7*     RANDOM:  No results for input(s): VANCORANDOM in the last 72 hours. Estimated Creatinine Clearance: 201 mL/min (A) (based on SCr of 0.6 mg/dL (L)).     Recent Labs     08/19/19  0532 08/20/19  0550 08/21/19  0625 08/21/19  1035   WBC 20.5* 14.9* 22.6* 20.0*   BUN 18 10 14  --    CREATININE 0.3* 0.6* 0.6*  --      WBC   Date Value Ref Range Status   08/21/2019 20.0 (H) 4.0 - 10.5 K/CU MM Final     Lactate   Date Value Ref Range Status   08/14/2019 1.4 0.4 - 2.0 mMOL/L Final   08/24/2016 1.5 0.4 - 2.0 mMOL/L Final   08/23/2016 0.9 0.4 - 2.0 mMOL/L Final     Temp Readings from Last 3 Encounters:   08/21/19 100.8 °F (38.2 °C) (Core)   10/20/18 97.8 °F (36.6 °C) (Oral)   06/25/17 97.2 °F (36.2 °C) (Oral)       Intake/Output Summary (Last 24 hours) at 8/21/2019 1134  Last data filed at 8/21/2019 0949  Gross per 24 hour   Intake 4201.2 ml   Output 4300 ml   Net -98.8 ml       Pertinent Cultures:  Date    Source    Results  8/17              Blood                                      Pending  8/16                             Sputum                                  Staph aureus  8/18                             MRSA screen   Negative  8/21                             Sputum (2nd cx)                    Pending         Assessment:  WBC and temperature: WBC elevated @ 20.0 ,  patient febrile @ 100.8F  SCr, BUN, and urine output: SCr stable, good UOP  Vancomycin trough:  7.7 on 8/19, subtherapeutic  Day(s) of therapy:  #4  Cultures:  Blood and

## 2019-08-21 NOTE — PROGRESS NOTES
Intravenous Q12H    cloNIDine  0.2 mg Per NG tube BID    propofol  10 mcg/kg/min Intravenous Once    nicotine  1 patch Transdermal Q24H    sodium chloride flush  10 mL Intravenous 2 times per day    enoxaparin  40 mg Subcutaneous Daily    fentanNYL  100 mcg Intravenous Once    ketamine  80 mg Intravenous Once    chlorhexidine  15 mL Mouth/Throat BID    pantoprazole  40 mg Intravenous Daily    And    sodium chloride (PF)  10 mL Intravenous Daily      Infusions:    cisatracurium (NIMBEX) infusion 2 mcg/kg/min (08/20/19 1618)    sodium chloride 75 mL/hr at 08/20/19 0942    fentanyl 200 mcg/hr (08/20/19 1826)    propofol 80 mcg/kg/min (08/20/19 2056)    midazolam 10 mg/hr (08/20/19 2020)     PRN Meds:     sodium chloride flush 10 mL PRN   acetaminophen 650 mg Q4H PRN   ziprasidone 20 mg Q4H PRN   And     sterile water 1.2 mL Q4H PRN   sodium chloride flush 10 mL PRN   magnesium hydroxide 30 mL Daily PRN   potassium chloride 10 mEq PRN   potassium chloride 20 mEq PRN   albuterol sulfate HFA 2 puff Q4H PRN   ondansetron 4 mg Q6H PRN         Electronically signed by Diana Bucio MD on 8/20/2019 at 9:00 PM

## 2019-08-21 NOTE — PROGRESS NOTES
08/21/19 0451   Vent Information   Vent Type 980   Vent Mode AC/VC   Vt Ordered 500 mL   Rate Set 20 bmp   Peak Flow 65 L/min   Pressure Support 0 cmH20   FiO2  50 %   Sensitivity 3   PEEP/CPAP 8   I Time/ I Time % 0 s   Humidification Source Heated wire   Humidification Temp 37   Humidification Temp Measured 36.8   Circuit Condensation Drained   Vent Patient Data   High Peep/I Pressure 0   Peak Inspiratory Pressure 40 cmH2O   Mean Airway Pressure 18 cmH20   Rate Measured 31 br/min   Vt Exhaled 287 mL   Minute Volume 20 Liters   I:E Ratio 1.30:1   Spontaneous Breathing Trial (SBT) RT Doc   Pulse 107   SpO2 96 %   Additional Respiratory  Assessments   Resp 25   Alarm Settings   High Pressure Alarm 45 cmH2O   Delay Alarm 20 sec(s)   Low Minute Volume Alarm 3 L/min   Apnea (secs) 20 secs   High Respiratory Rate 90 br/min   Low Exhaled Vt  250 mL   Non-Surgical Airway Endo Tracheal Tube   Placement Date/Time: 08/14/19 9529   Timeout: Patient;Procedure;Site/Side;Appropriate Equipment; Consent Confirmed;Site prepped with chlorhexidine;Correct Position  Mask Ventilation: Ventilated by mask with oral airway (2)  Placed By: In ED  Inserted b. ..    Measured From Lips   Secured Location Left   Secured By Commercial tube vila   Site Condition Dry   Cuff Pressure   (minimal leak)

## 2019-08-21 NOTE — PROGRESS NOTES
08/20/19  0800 08/21/19  0800   PH 7.44 7.36 7.42   PO2ART 117* 55* 65*   LUR3EJC 41.0 56.0* 46.0*   O2SAT 96.2 88.1* 92.4*     No results found for: PROBNP, THEOPH  No results found for: [facility]    Radiology Review:  Pertinent images / reports were reviewed as a part of this visit. Assessment:     Patient Active Problem List   Diagnosis    Chest pain    Pneumomediastinum ([facility] [address]    Opiate withdrawal (HCC)    Cellulitis    Acute respiratory failure ([facility] [address]       Plan:   1. Overall the patient is unchanged. 2. Cont full vent support. 3. Sedation vacation. 4. Discussed with the family. 5. Sputum for C/S.  6. Blood cultures.   Samir Nova MD  8/21/2019  10:55 AM

## 2019-08-21 NOTE — PROGRESS NOTES
Polk catheter is present. MSK No gross joint deformities. Spontaneous movement of all extremities. SKIN Normal coloration, warm, dry. Several tattoo marks   NEURO Intubated and sedated and paralyzed with Nimbex.     Medications:   Medications:    ipratropium-albuterol  1 ampule Inhalation Q4H    acetylcysteine  4 mL Inhalation TID    lisinopril  5 mg Oral Daily    sodium chloride flush  10 mL Intravenous 2 times per day    cefepime  2 g Intravenous Q12H    cloNIDine  0.2 mg Per NG tube BID    propofol  10 mcg/kg/min Intravenous Once    nicotine  1 patch Transdermal Q24H    sodium chloride flush  10 mL Intravenous 2 times per day    enoxaparin  40 mg Subcutaneous Daily    fentanNYL  100 mcg Intravenous Once    ketamine  80 mg Intravenous Once    chlorhexidine  15 mL Mouth/Throat BID    pantoprazole  40 mg Intravenous Daily    And    sodium chloride (PF)  10 mL Intravenous Daily      Infusions:    cisatracurium (NIMBEX) infusion 3.5 mcg/kg/min (08/21/19 0443)    sodium chloride 75 mL/hr at 08/20/19 2302    fentanyl 200 mcg/hr (08/21/19 0543)    propofol 80 mcg/kg/min (08/21/19 0701)    midazolam 10 mg/hr (08/21/19 2734)     PRN Meds:     sodium chloride flush 10 mL PRN   acetaminophen 650 mg Q4H PRN   ziprasidone 20 mg Q4H PRN   And     sterile water 1.2 mL Q4H PRN   sodium chloride flush 10 mL PRN   magnesium hydroxide 30 mL Daily PRN   potassium chloride 10 mEq PRN   potassium chloride 20 mEq PRN   albuterol sulfate HFA 2 puff Q4H PRN   ondansetron 4 mg Q6H PRN         Electronically signed by Maikel Shannon MD on 8/21/2019 at 7:57 AM

## 2019-08-22 ENCOUNTER — APPOINTMENT (OUTPATIENT)
Dept: GENERAL RADIOLOGY | Age: 29
DRG: 917 | End: 2019-08-22

## 2019-08-22 ENCOUNTER — ANESTHESIA EVENT (OUTPATIENT)
Dept: ENDOSCOPY | Age: 29
DRG: 917 | End: 2019-08-22

## 2019-08-22 LAB
ALBUMIN SERPL-MCNC: 3.4 GM/DL (ref 3.4–5)
ALP BLD-CCNC: 95 IU/L (ref 40–128)
ALT SERPL-CCNC: 33 U/L (ref 10–40)
ANION GAP SERPL CALCULATED.3IONS-SCNC: 11 MMOL/L (ref 4–16)
AST SERPL-CCNC: 23 IU/L (ref 15–37)
BASE EXCESS MIXED: ABNORMAL (ref 0–1.2)
BILIRUB SERPL-MCNC: 0.3 MG/DL (ref 0–1)
BUN BLDV-MCNC: 15 MG/DL (ref 6–23)
CALCIUM SERPL-MCNC: 9.1 MG/DL (ref 8.3–10.6)
CARBON MONOXIDE, BLOOD: 1.2 % (ref 0–5)
CHLORIDE BLD-SCNC: 104 MMOL/L (ref 99–110)
CO2 CONTENT: 28.6 MMOL/L (ref 19–24)
CO2: 27 MMOL/L (ref 21–32)
COMMENT: ABNORMAL
CREAT SERPL-MCNC: 0.6 MG/DL (ref 0.9–1.3)
CULTURE: NORMAL
CULTURE: NORMAL
DOSE AMOUNT: NORMAL
DOSE TIME: NORMAL
GFR AFRICAN AMERICAN: >60 ML/MIN/1.73M2
GFR NON-AFRICAN AMERICAN: >60 ML/MIN/1.73M2
GLUCOSE BLD-MCNC: 120 MG/DL (ref 70–99)
HCO3 ARTERIAL: 27.1 MMOL/L (ref 18–23)
HCT VFR BLD CALC: 44.5 % (ref 42–52)
HEMOGLOBIN: 13.6 GM/DL (ref 13.5–18)
INR BLD: 1.14 INDEX
LACTATE: 0.5 MMOL/L (ref 0.4–2)
Lab: NORMAL
Lab: NORMAL
MAGNESIUM: 1.8 MG/DL (ref 1.8–2.4)
MCH RBC QN AUTO: 30.2 PG (ref 27–31)
MCHC RBC AUTO-ENTMCNC: 30.6 % (ref 32–36)
MCV RBC AUTO: 98.7 FL (ref 78–100)
METHEMOGLOBIN ARTERIAL: 1.5 %
O2 SATURATION: 90.7 % (ref 96–97)
PCO2 ARTERIAL: 49 MMHG (ref 32–45)
PDW BLD-RTO: 11.9 % (ref 11.7–14.9)
PH BLOOD: 7.35 (ref 7.34–7.45)
PHOSPHORUS: 3.1 MG/DL (ref 2.5–4.9)
PLATELET # BLD: 278 K/CU MM (ref 140–440)
PMV BLD AUTO: 9.5 FL (ref 7.5–11.1)
PO2 ARTERIAL: 64 MMHG (ref 75–100)
POTASSIUM SERPL-SCNC: 3.9 MMOL/L (ref 3.5–5.1)
PROCALCITONIN: 0.12
PROTHROMBIN TIME: 13 SECONDS (ref 9.12–12.5)
RBC # BLD: 4.51 M/CU MM (ref 4.6–6.2)
SODIUM BLD-SCNC: 142 MMOL/L (ref 135–145)
SPECIMEN: NORMAL
SPECIMEN: NORMAL
TOTAL PROTEIN: 6.3 GM/DL (ref 6.4–8.2)
VANCOMYCIN TROUGH: 14 UG/ML (ref 10–20)
WBC # BLD: 18.9 K/CU MM (ref 4–10.5)

## 2019-08-22 PROCEDURE — 87102 FUNGUS ISOLATION CULTURE: CPT

## 2019-08-22 PROCEDURE — 2500000003 HC RX 250 WO HCPCS: Performed by: INTERNAL MEDICINE

## 2019-08-22 PROCEDURE — 74018 RADEX ABDOMEN 1 VIEW: CPT

## 2019-08-22 PROCEDURE — 2580000003 HC RX 258: Performed by: INTERNAL MEDICINE

## 2019-08-22 PROCEDURE — 94640 AIRWAY INHALATION TREATMENT: CPT

## 2019-08-22 PROCEDURE — 6360000002 HC RX W HCPCS: Performed by: INTERNAL MEDICINE

## 2019-08-22 PROCEDURE — 94668 MNPJ CHEST WALL SBSQ: CPT

## 2019-08-22 PROCEDURE — 94003 VENT MGMT INPAT SUBQ DAY: CPT

## 2019-08-22 PROCEDURE — 36600 WITHDRAWAL OF ARTERIAL BLOOD: CPT

## 2019-08-22 PROCEDURE — 36415 COLL VENOUS BLD VENIPUNCTURE: CPT

## 2019-08-22 PROCEDURE — 85610 PROTHROMBIN TIME: CPT

## 2019-08-22 PROCEDURE — 6370000000 HC RX 637 (ALT 250 FOR IP): Performed by: INTERNAL MEDICINE

## 2019-08-22 PROCEDURE — 84145 PROCALCITONIN (PCT): CPT

## 2019-08-22 PROCEDURE — 2700000000 HC OXYGEN THERAPY PER DAY

## 2019-08-22 PROCEDURE — 85027 COMPLETE CBC AUTOMATED: CPT

## 2019-08-22 PROCEDURE — 2000000000 HC ICU R&B

## 2019-08-22 PROCEDURE — 71045 X-RAY EXAM CHEST 1 VIEW: CPT

## 2019-08-22 PROCEDURE — 83605 ASSAY OF LACTIC ACID: CPT

## 2019-08-22 PROCEDURE — 82803 BLOOD GASES ANY COMBINATION: CPT

## 2019-08-22 PROCEDURE — 94761 N-INVAS EAR/PLS OXIMETRY MLT: CPT

## 2019-08-22 PROCEDURE — C9113 INJ PANTOPRAZOLE SODIUM, VIA: HCPCS | Performed by: INTERNAL MEDICINE

## 2019-08-22 PROCEDURE — 36592 COLLECT BLOOD FROM PICC: CPT

## 2019-08-22 PROCEDURE — 87040 BLOOD CULTURE FOR BACTERIA: CPT

## 2019-08-22 PROCEDURE — 2500000003 HC RX 250 WO HCPCS: Performed by: HOSPITALIST

## 2019-08-22 PROCEDURE — 94750 HC PULMONARY COMPLIANCE STUDY: CPT

## 2019-08-22 PROCEDURE — 84100 ASSAY OF PHOSPHORUS: CPT

## 2019-08-22 PROCEDURE — 31720 CLEARANCE OF AIRWAYS: CPT

## 2019-08-22 PROCEDURE — 80053 COMPREHEN METABOLIC PANEL: CPT

## 2019-08-22 PROCEDURE — 89220 SPUTUM SPECIMEN COLLECTION: CPT

## 2019-08-22 PROCEDURE — 83735 ASSAY OF MAGNESIUM: CPT

## 2019-08-22 PROCEDURE — 80202 ASSAY OF VANCOMYCIN: CPT

## 2019-08-22 RX ORDER — CLONIDINE HYDROCHLORIDE 0.2 MG/1
0.2 TABLET ORAL 3 TIMES DAILY
Status: DISCONTINUED | OUTPATIENT
Start: 2019-08-22 | End: 2019-08-26

## 2019-08-22 RX ORDER — QUETIAPINE FUMARATE 25 MG/1
25 TABLET, FILM COATED ORAL 3 TIMES DAILY
Status: DISCONTINUED | OUTPATIENT
Start: 2019-08-22 | End: 2019-08-28 | Stop reason: HOSPADM

## 2019-08-22 RX ORDER — LORAZEPAM 2 MG/ML
1 INJECTION INTRAMUSCULAR EVERY 6 HOURS PRN
Status: DISCONTINUED | OUTPATIENT
Start: 2019-08-22 | End: 2019-08-26

## 2019-08-22 RX ORDER — METOPROLOL TARTRATE 5 MG/5ML
2.5 INJECTION INTRAVENOUS ONCE
Status: COMPLETED | OUTPATIENT
Start: 2019-08-22 | End: 2019-08-22

## 2019-08-22 RX ADMIN — Medication 200 MCG/HR: at 23:09

## 2019-08-22 RX ADMIN — IPRATROPIUM BROMIDE AND ALBUTEROL SULFATE 1 AMPULE: .5; 3 SOLUTION RESPIRATORY (INHALATION) at 20:10

## 2019-08-22 RX ADMIN — SODIUM CHLORIDE 3 MCG/KG/MIN: 9 INJECTION, SOLUTION INTRAVENOUS at 20:36

## 2019-08-22 RX ADMIN — PROPOFOL 80 MCG/KG/MIN: 10 INJECTION, EMULSION INTRAVENOUS at 20:52

## 2019-08-22 RX ADMIN — CHLORHEXIDINE GLUCONATE 0.12% ORAL RINSE 15 ML: 1.2 LIQUID ORAL at 08:50

## 2019-08-22 RX ADMIN — CLONIDINE HYDROCHLORIDE 0.2 MG: 0.2 TABLET ORAL at 14:10

## 2019-08-22 RX ADMIN — METRONIDAZOLE 500 MG: 500 INJECTION, SOLUTION INTRAVENOUS at 04:25

## 2019-08-22 RX ADMIN — VANCOMYCIN HYDROCHLORIDE 1500 MG: 5 INJECTION, POWDER, LYOPHILIZED, FOR SOLUTION INTRAVENOUS at 04:25

## 2019-08-22 RX ADMIN — PROPOFOL 80 MCG/KG/MIN: 10 INJECTION, EMULSION INTRAVENOUS at 13:03

## 2019-08-22 RX ADMIN — Medication 10 ML: at 08:52

## 2019-08-22 RX ADMIN — QUETIAPINE FUMARATE 25 MG: 25 TABLET ORAL at 20:36

## 2019-08-22 RX ADMIN — MIDAZOLAM HYDROCHLORIDE 10 MG/HR: 5 INJECTION, SOLUTION INTRAMUSCULAR; INTRAVENOUS at 01:53

## 2019-08-22 RX ADMIN — Medication 200 MCG/HR: at 11:56

## 2019-08-22 RX ADMIN — Medication 10 ML: at 20:36

## 2019-08-22 RX ADMIN — IPRATROPIUM BROMIDE AND ALBUTEROL SULFATE 1 AMPULE: .5; 3 SOLUTION RESPIRATORY (INHALATION) at 07:42

## 2019-08-22 RX ADMIN — SODIUM CHLORIDE: 9 INJECTION, SOLUTION INTRAVENOUS at 10:05

## 2019-08-22 RX ADMIN — PROPOFOL 80 MCG/KG/MIN: 10 INJECTION, EMULSION INTRAVENOUS at 07:27

## 2019-08-22 RX ADMIN — LISINOPRIL 5 MG: 5 TABLET ORAL at 08:50

## 2019-08-22 RX ADMIN — Medication 200 MCG/HR: at 01:52

## 2019-08-22 RX ADMIN — PROPOFOL 80 MCG/KG/MIN: 10 INJECTION, EMULSION INTRAVENOUS at 18:18

## 2019-08-22 RX ADMIN — METRONIDAZOLE 500 MG: 500 INJECTION, SOLUTION INTRAVENOUS at 12:36

## 2019-08-22 RX ADMIN — PANTOPRAZOLE SODIUM 40 MG: 40 INJECTION, POWDER, FOR SOLUTION INTRAVENOUS at 08:50

## 2019-08-22 RX ADMIN — MIDAZOLAM HYDROCHLORIDE 10 MG/HR: 5 INJECTION, SOLUTION INTRAMUSCULAR; INTRAVENOUS at 11:57

## 2019-08-22 RX ADMIN — NAFCILLIN 12 G: 2 POWDER, FOR SOLUTION INTRAMUSCULAR; INTRAVENOUS at 19:33

## 2019-08-22 RX ADMIN — CLONIDINE HYDROCHLORIDE 0.2 MG: 0.2 TABLET ORAL at 21:10

## 2019-08-22 RX ADMIN — CEFEPIME HYDROCHLORIDE 2 G: 2 INJECTION, POWDER, FOR SOLUTION INTRAVENOUS at 16:47

## 2019-08-22 RX ADMIN — CEFEPIME HYDROCHLORIDE 2 G: 2 INJECTION, POWDER, FOR SOLUTION INTRAVENOUS at 02:50

## 2019-08-22 RX ADMIN — IPRATROPIUM BROMIDE AND ALBUTEROL SULFATE 1 AMPULE: .5; 3 SOLUTION RESPIRATORY (INHALATION) at 04:39

## 2019-08-22 RX ADMIN — MIDAZOLAM HYDROCHLORIDE 10 MG/HR: 5 INJECTION, SOLUTION INTRAMUSCULAR; INTRAVENOUS at 23:09

## 2019-08-22 RX ADMIN — HYDROCORTISONE SODIUM SUCCINATE 50 MG: 100 INJECTION, POWDER, FOR SOLUTION INTRAMUSCULAR; INTRAVENOUS at 04:24

## 2019-08-22 RX ADMIN — ACETAMINOPHEN 650 MG: 650 SUPPOSITORY RECTAL at 16:47

## 2019-08-22 RX ADMIN — CLONIDINE HYDROCHLORIDE 0.2 MG: 0.2 TABLET ORAL at 04:24

## 2019-08-22 RX ADMIN — LORAZEPAM 1 MG: 2 INJECTION INTRAMUSCULAR; INTRAVENOUS at 17:51

## 2019-08-22 RX ADMIN — IPRATROPIUM BROMIDE AND ALBUTEROL SULFATE 1 AMPULE: .5; 3 SOLUTION RESPIRATORY (INHALATION) at 15:01

## 2019-08-22 RX ADMIN — ENOXAPARIN SODIUM 40 MG: 100 INJECTION SUBCUTANEOUS at 08:50

## 2019-08-22 RX ADMIN — SODIUM CHLORIDE 3.5 MCG/KG/MIN: 9 INJECTION, SOLUTION INTRAVENOUS at 01:52

## 2019-08-22 RX ADMIN — QUETIAPINE FUMARATE 25 MG: 25 TABLET ORAL at 14:10

## 2019-08-22 RX ADMIN — VANCOMYCIN HYDROCHLORIDE 1500 MG: 5 INJECTION, POWDER, LYOPHILIZED, FOR SOLUTION INTRAVENOUS at 12:36

## 2019-08-22 RX ADMIN — PROPOFOL 80 MCG/KG/MIN: 10 INJECTION, EMULSION INTRAVENOUS at 10:04

## 2019-08-22 RX ADMIN — ACETYLCYSTEINE 400 MG: 100 SOLUTION ORAL; RESPIRATORY (INHALATION) at 07:48

## 2019-08-22 RX ADMIN — METOPROLOL TARTRATE 2.5 MG: 5 INJECTION INTRAVENOUS at 04:24

## 2019-08-22 RX ADMIN — PROPOFOL 80 MCG/KG/MIN: 10 INJECTION, EMULSION INTRAVENOUS at 01:50

## 2019-08-22 RX ADMIN — IPRATROPIUM BROMIDE AND ALBUTEROL SULFATE 1 AMPULE: .5; 3 SOLUTION RESPIRATORY (INHALATION) at 11:25

## 2019-08-22 RX ADMIN — IPRATROPIUM BROMIDE AND ALBUTEROL SULFATE 1 AMPULE: .5; 3 SOLUTION RESPIRATORY (INHALATION) at 23:50

## 2019-08-22 RX ADMIN — PROPOFOL 80 MCG/KG/MIN: 10 INJECTION, EMULSION INTRAVENOUS at 05:22

## 2019-08-22 RX ADMIN — QUETIAPINE FUMARATE 25 MG: 25 TABLET ORAL at 10:56

## 2019-08-22 RX ADMIN — ACETYLCYSTEINE 400 MG: 100 SOLUTION ORAL; RESPIRATORY (INHALATION) at 20:14

## 2019-08-22 RX ADMIN — CHLORHEXIDINE GLUCONATE 0.12% ORAL RINSE 15 ML: 1.2 LIQUID ORAL at 20:36

## 2019-08-22 RX ADMIN — PROPOFOL 80 MCG/KG/MIN: 10 INJECTION, EMULSION INTRAVENOUS at 23:34

## 2019-08-22 RX ADMIN — Medication 200 MCG/HR: at 17:49

## 2019-08-22 RX ADMIN — PROPOFOL 80 MCG/KG/MIN: 10 INJECTION, EMULSION INTRAVENOUS at 15:24

## 2019-08-22 ASSESSMENT — PULMONARY FUNCTION TESTS
PIF_VALUE: 26
PIF_VALUE: 23
PIF_VALUE: 24
PIF_VALUE: 33
PIF_VALUE: 26
PIF_VALUE: 33
PIF_VALUE: 25
PIF_VALUE: 27
PIF_VALUE: 23
PIF_VALUE: 16
PIF_VALUE: 26
PIF_VALUE: 3
PIF_VALUE: 26
PIF_VALUE: 39
PIF_VALUE: 29
PIF_VALUE: 27
PIF_VALUE: 27
PIF_VALUE: 30
PIF_VALUE: 18
PIF_VALUE: 27
PIF_VALUE: 21
PIF_VALUE: 25
PIF_VALUE: 26
PIF_VALUE: 24
PIF_VALUE: 28
PIF_VALUE: 27
PIF_VALUE: 26
PIF_VALUE: 28
PIF_VALUE: 24
PIF_VALUE: 25
PIF_VALUE: 25
PIF_VALUE: 27
PIF_VALUE: 27
PIF_VALUE: 24
PIF_VALUE: 27
PIF_VALUE: 21
PIF_VALUE: 33
PIF_VALUE: 31
PIF_VALUE: 27
PIF_VALUE: 25
PIF_VALUE: 21
PIF_VALUE: 28
PIF_VALUE: 26
PIF_VALUE: 27
PIF_VALUE: 29
PIF_VALUE: 33
PIF_VALUE: 27
PIF_VALUE: 28
PIF_VALUE: 27

## 2019-08-22 ASSESSMENT — PAIN SCALES - GENERAL
PAINLEVEL_OUTOF10: 0
PAINLEVEL_OUTOF10: 0

## 2019-08-22 NOTE — PROGRESS NOTES
Nutrition Assessment (Enteral Nutrition)    Type and Reason for Visit: Reassess    Nutrition Recommendations:   · Increase EN to 55 mL/hr with the addition of one protein modular per day to provide the pt with 2464 kcal and 142 g of protein per day with current propofol rate    Nutrition Assessment: Pt has been on a EN since 8/19 and has been tolerating it. Please continue current EN as pt is not to be extubated. Malnutrition Assessment:  · Malnutrition Status: At risk for malnutrition  · Context: Social or environmental circumstances  · Findings of the 6 clinical characteristics of malnutrition (Minimum of 2 out of 6 clinical characteristics is required to make the diagnosis of moderate or severe Protein Calorie Malnutrition based on AND/ASPEN Guidelines):  1. Energy Intake-Greater than 75% of estimated energy requirement, Greater than or equal to 3 months    2. Weight Loss-No significant weight loss, in 6 months  3. Fat Loss-No significant subcutaneous fat loss, Orbital  4. Muscle Loss-No significant muscle mass loss, Clavicles (pectoralis and deltoids)  5. Fluid Accumulation-No significant fluid accumulation, Extremities  6.   Strength-Not measured    Nutrition Risk Level: High    Nutrition Needs:  · Estimated Daily Total Kcal: 2436 based on susan state 2003b  · Estimated Daily Protein (g): 122-162 based on 1.5-2 g/kg/IBW  · Estimated Daily Fluid (ml/day): 2436 based on 1 mL/kcal    Nutrition Diagnosis:   · Problem: Inadequate oral intake  · Etiology: related to Impaired respiratory function-inability to consume food     Signs and symptoms:  as evidenced by NPO status due to medical condition, Intubation    Objective Information:  · Wound Type: None  · Current Nutrition Therapies:  · Oral Diet Orders: NPO   · Oral Nutrition Supplement (ONS) Orders: Protein Modular, Other Oral Supplement (See Comment)(kefir)  · Tube Feeding (TF) Orders:   · Feeding Route: Nasogastric  · Formula: Low Calorie, High

## 2019-08-22 NOTE — PROGRESS NOTES
[facility]    Hospitalist Progress Note      Name:  Bredna Beebe /Age/Sex: 1990  (30 y.o. male)   MRN & CSN:  [id number] & [id number] Admission Date/Time: 2019 12:41 PM   Location:  -A PCP: No primary care provider on file. Hospital Day: 9    Assessment and Plan:   Brenda Beebe is a 30 y.o.  male with past medical history of drug abuse - multi-substance- who presents with unresponsiveness at home.      # Acute toxic encephalopathy   From multiple drug use and possible overdose - found unresponsive at home,   Given several doses of Narcan by EMS with minimal to no response -  Urine drug screen positive for cocaine, benzo, opiates, PCP. CT head unremarkable   Intubated in ER due to respiratory failure  Requiring high sedation to keep him calm and started on Nimbex (paralytic) since .     # Acute hypoxic respiratory failure -overall unchanged. Intubated on  in ED- Vent management per Pulmonary   Right lower lobe collapse present on admission is improving. On Mucomyst.  Continue vest therapy pulmonary pulmonary medicine plans to do bronchoscopy tomorrow. # Severe Agitation and restlessness   Likely from withdrawal of various drugs  curently sedated on propofol, Versed and fentanyl - also on physical restraints. Will add scheduled Seroquel to help with agitation. # Aspiration pneumonia? >>VAP  #Sepsis  Initially treated for Aspiration then VAP. Patient reportedly vomited several times at home before admission. Procal 0.096. MRSA nasal screen negative. Sputum culture  grew MSSA (rare growth). Was initially on Unasyn but was then changed to Vancomycin and Cefepime on  for probable VAP--> vanc was then discontinued  and cefepime continued because MRSA nasal screen was negative. He developed low grade fever and leukocytosis on 19. Sputum culture  again is positive for MSSA (moderate growth).  ID was consulted this morning and they recommend

## 2019-08-22 NOTE — PLAN OF CARE
Nutrition Problem: Inadequate oral intake  Intervention: Food and/or Nutrient Delivery: Continue current ONS, Continue current Tube Feeding  Nutritional Goals: Pt will tolerate EN to receive greater than 75% of estimated needs

## 2019-08-22 NOTE — ANESTHESIA PRE PROCEDURE
Department of Anesthesiology  Preprocedure Note       Name:  Nathan Wills   Age:  30 y.o.  :  1990                                          MRN:  [id number]         Date:  2019      Surgeon: Berry Henry):  Madison Hendrickson MD    Procedure: BRONCHOSCOPY (N/A )    Medications prior to admission:   Prior to Admission medications    Medication Sig Start Date End Date Taking? Authorizing Provider   naltrexone (VIVITROL) 380 MG injection Inject 380 mg into the muscle once    Historical Provider, MD   Spacer/Aero-Holding Chambers (E-Z SPACER) PARKER 1 Device by Does not apply route daily as needed (for use with inhaler for wheezing) 10/20/18   Sara Jose MD   nicotine (NICODERM CQ) 21 MG/24HR Place 1 patch onto the skin every 24 hours 10/20/18   Sara Jose MD   albuterol sulfate HFA (PROVENTIL HFA) 108 (90 Base) MCG/ACT inhaler Inhale 2 puffs into the lungs every 4 hours as needed for Wheezing or Shortness of Breath With spacer (and mask if indicated). Thanks.  10/20/18 11/19/18  Sara Jose MD   mirtazapine (REMERON) 15 MG tablet Take 15 mg by mouth nightly    Historical Provider, MD       Current medications:    Current Facility-Administered Medications   Medication Dose Route Frequency Provider Last Rate Last Dose    cloNIDine (CATAPRES) tablet 0.2 mg  0.2 mg Per NG tube TID Adi Ying MD   0.2 mg at 19 1410    QUEtiapine (SEROQUEL) tablet 25 mg  25 mg Oral TID Esme Chavez MD   25 mg at 19 1410    nafcillin 12 g in dextrose 5 % 500 mL infusion  12 g Intravenous Q24H Blake Paulino MD        LORazepam (ATIVAN) injection 1 mg  1 mg Intravenous Q6H PRN Esme Chavez MD        ipratropium-albuterol (DUONEB) nebulizer solution 1 ampule  1 ampule Inhalation Q4H Dave German MD   1 ampule at 19 1501    acetylcysteine (MUCOMYST) 10 % solution 400 mg  4 mL Inhalation TID Madison Hendrickson MD   400 mg at 19 0748    lisinopril Pulmonary:normal exam                              ROS comment: resp failure, on vent   Cardiovascular:    (+) hypertension:,                ROS comment: Diagnosis   Final 08/14/2019  2:02 PM CEKT  Normal sinus rhythm   Possible Left atrial enlargement   Borderline ECG   No previous ECGs available   Confirmed by [facility] Siri CASTANEDA Mai ([id number]) on 8/14/2019 9         Neuro/Psych:   (+) psychiatric history:             ROS comment: Opiates overdose GI/Hepatic/Renal: Neg GI/Hepatic/Renal ROS            Endo/Other: Negative Endo/Other ROS                    Abdominal:           Vascular: negative vascular ROS.                                      Anesthesia Plan      general     ASA 3                                 Dodaniele Hector, APRN - CRNA   8/22/2019

## 2019-08-23 ENCOUNTER — ANESTHESIA (OUTPATIENT)
Dept: ENDOSCOPY | Age: 29
DRG: 917 | End: 2019-08-23

## 2019-08-23 ENCOUNTER — APPOINTMENT (OUTPATIENT)
Dept: GENERAL RADIOLOGY | Age: 29
DRG: 917 | End: 2019-08-23

## 2019-08-23 VITALS
DIASTOLIC BLOOD PRESSURE: 51 MMHG | OXYGEN SATURATION: 100 % | SYSTOLIC BLOOD PRESSURE: 98 MMHG | RESPIRATION RATE: 18 BRPM

## 2019-08-23 LAB
ANION GAP SERPL CALCULATED.3IONS-SCNC: 12 MMOL/L (ref 4–16)
BASE EXCESS MIXED: ABNORMAL (ref 0–1.2)
BUN BLDV-MCNC: 20 MG/DL (ref 6–23)
CALCIUM SERPL-MCNC: 9.2 MG/DL (ref 8.3–10.6)
CARBON MONOXIDE, BLOOD: 1.6 % (ref 0–5)
CHLORIDE BLD-SCNC: 108 MMOL/L (ref 99–110)
CO2 CONTENT: 27.7 MMOL/L (ref 19–24)
CO2: 25 MMOL/L (ref 21–32)
COMMENT: ABNORMAL
CREAT SERPL-MCNC: 0.6 MG/DL (ref 0.9–1.3)
CULTURE: ABNORMAL
GFR AFRICAN AMERICAN: >60 ML/MIN/1.73M2
GFR NON-AFRICAN AMERICAN: >60 ML/MIN/1.73M2
GLUCOSE BLD-MCNC: 106 MG/DL (ref 70–99)
GLUCOSE BLD-MCNC: 84 MG/DL (ref 70–99)
GLUCOSE BLD-MCNC: 86 MG/DL (ref 70–99)
GLUCOSE BLD-MCNC: 99 MG/DL (ref 70–99)
GRAM SMEAR: ABNORMAL
HCO3 ARTERIAL: 26.5 MMOL/L (ref 18–23)
HCT VFR BLD CALC: 38 % (ref 42–52)
HEMOGLOBIN: 12.5 GM/DL (ref 13.5–18)
LACTATE: 0.7 MMOL/L (ref 0.4–2)
LV EF: 58 %
LVEF MODALITY: NORMAL
Lab: ABNORMAL
MAGNESIUM: 1.8 MG/DL (ref 1.8–2.4)
MCH RBC QN AUTO: 30.6 PG (ref 27–31)
MCHC RBC AUTO-ENTMCNC: 32.9 % (ref 32–36)
MCV RBC AUTO: 93.1 FL (ref 78–100)
METHEMOGLOBIN ARTERIAL: 1 %
O2 SATURATION: 93.6 % (ref 96–97)
PCO2 ARTERIAL: 39 MMHG (ref 32–45)
PDW BLD-RTO: 11.9 % (ref 11.7–14.9)
PH BLOOD: 7.44 (ref 7.34–7.45)
PHOSPHORUS: 4 MG/DL (ref 2.5–4.9)
PLATELET # BLD: 306 K/CU MM (ref 140–440)
PMV BLD AUTO: 9.5 FL (ref 7.5–11.1)
PO2 ARTERIAL: 66 MMHG (ref 75–100)
POTASSIUM SERPL-SCNC: 3.5 MMOL/L (ref 3.5–5.1)
PROCALCITONIN: 0.13
RBC # BLD: 4.08 M/CU MM (ref 4.6–6.2)
SODIUM BLD-SCNC: 145 MMOL/L (ref 135–145)
SPECIMEN: ABNORMAL
WBC # BLD: 17.5 K/CU MM (ref 4–10.5)

## 2019-08-23 PROCEDURE — 0B9F8ZX DRAINAGE OF RIGHT LOWER LUNG LOBE, VIA NATURAL OR ARTIFICIAL OPENING ENDOSCOPIC, DIAGNOSTIC: ICD-10-PCS | Performed by: INTERNAL MEDICINE

## 2019-08-23 PROCEDURE — 86704 HEP B CORE ANTIBODY TOTAL: CPT

## 2019-08-23 PROCEDURE — 83605 ASSAY OF LACTIC ACID: CPT

## 2019-08-23 PROCEDURE — 7100000000 HC PACU RECOVERY - FIRST 15 MIN

## 2019-08-23 PROCEDURE — 87340 HEPATITIS B SURFACE AG IA: CPT

## 2019-08-23 PROCEDURE — 2500000003 HC RX 250 WO HCPCS: Performed by: INTERNAL MEDICINE

## 2019-08-23 PROCEDURE — 94640 AIRWAY INHALATION TREATMENT: CPT

## 2019-08-23 PROCEDURE — 6370000000 HC RX 637 (ALT 250 FOR IP): Performed by: INTERNAL MEDICINE

## 2019-08-23 PROCEDURE — 0BC88ZZ EXTIRPATION OF MATTER FROM LEFT UPPER LOBE BRONCHUS, VIA NATURAL OR ARTIFICIAL OPENING ENDOSCOPIC: ICD-10-PCS | Performed by: INTERNAL MEDICINE

## 2019-08-23 PROCEDURE — 85027 COMPLETE CBC AUTOMATED: CPT

## 2019-08-23 PROCEDURE — 86708 HEPATITIS A ANTIBODY: CPT

## 2019-08-23 PROCEDURE — 0BC68ZZ EXTIRPATION OF MATTER FROM RIGHT LOWER LOBE BRONCHUS, VIA NATURAL OR ARTIFICIAL OPENING ENDOSCOPIC: ICD-10-PCS | Performed by: INTERNAL MEDICINE

## 2019-08-23 PROCEDURE — 86706 HEP B SURFACE ANTIBODY: CPT

## 2019-08-23 PROCEDURE — 71045 X-RAY EXAM CHEST 1 VIEW: CPT

## 2019-08-23 PROCEDURE — 87205 SMEAR GRAM STAIN: CPT

## 2019-08-23 PROCEDURE — 87522 HEPATITIS C REVRS TRNSCRPJ: CPT

## 2019-08-23 PROCEDURE — 87116 MYCOBACTERIA CULTURE: CPT

## 2019-08-23 PROCEDURE — 2000000000 HC ICU R&B

## 2019-08-23 PROCEDURE — 93306 TTE W/DOPPLER COMPLETE: CPT

## 2019-08-23 PROCEDURE — 2580000003 HC RX 258: Performed by: INTERNAL MEDICINE

## 2019-08-23 PROCEDURE — 87147 CULTURE TYPE IMMUNOLOGIC: CPT

## 2019-08-23 PROCEDURE — 84100 ASSAY OF PHOSPHORUS: CPT

## 2019-08-23 PROCEDURE — 31720 CLEARANCE OF AIRWAYS: CPT

## 2019-08-23 PROCEDURE — 6360000002 HC RX W HCPCS: Performed by: INTERNAL MEDICINE

## 2019-08-23 PROCEDURE — 94668 MNPJ CHEST WALL SBSQ: CPT

## 2019-08-23 PROCEDURE — 31628 BRONCHOSCOPY/LUNG BX EACH: CPT

## 2019-08-23 PROCEDURE — 87186 SC STD MICRODIL/AGAR DIL: CPT

## 2019-08-23 PROCEDURE — 87077 CULTURE AEROBIC IDENTIFY: CPT

## 2019-08-23 PROCEDURE — 2700000000 HC OXYGEN THERAPY PER DAY

## 2019-08-23 PROCEDURE — 84145 PROCALCITONIN (PCT): CPT

## 2019-08-23 PROCEDURE — 87070 CULTURE OTHR SPECIMN AEROBIC: CPT

## 2019-08-23 PROCEDURE — 88112 CYTOPATH CELL ENHANCE TECH: CPT

## 2019-08-23 PROCEDURE — 0BC58ZZ EXTIRPATION OF MATTER FROM RIGHT MIDDLE LOBE BRONCHUS, VIA NATURAL OR ARTIFICIAL OPENING ENDOSCOPIC: ICD-10-PCS | Performed by: INTERNAL MEDICINE

## 2019-08-23 PROCEDURE — 94750 HC PULMONARY COMPLIANCE STUDY: CPT

## 2019-08-23 PROCEDURE — 94761 N-INVAS EAR/PLS OXIMETRY MLT: CPT

## 2019-08-23 PROCEDURE — 3700000001 HC ADD 15 MINUTES (ANESTHESIA): Performed by: INTERNAL MEDICINE

## 2019-08-23 PROCEDURE — 36592 COLLECT BLOOD FROM PICC: CPT

## 2019-08-23 PROCEDURE — 94003 VENT MGMT INPAT SUBQ DAY: CPT

## 2019-08-23 PROCEDURE — 83735 ASSAY OF MAGNESIUM: CPT

## 2019-08-23 PROCEDURE — 31624 DX BRONCHOSCOPE/LAVAGE: CPT

## 2019-08-23 PROCEDURE — 80048 BASIC METABOLIC PNL TOTAL CA: CPT

## 2019-08-23 PROCEDURE — 99233 SBSQ HOSP IP/OBS HIGH 50: CPT | Performed by: INTERNAL MEDICINE

## 2019-08-23 PROCEDURE — 88305 TISSUE EXAM BY PATHOLOGIST: CPT

## 2019-08-23 PROCEDURE — 87102 FUNGUS ISOLATION CULTURE: CPT

## 2019-08-23 PROCEDURE — C9113 INJ PANTOPRAZOLE SODIUM, VIA: HCPCS | Performed by: INTERNAL MEDICINE

## 2019-08-23 PROCEDURE — 7100000001 HC PACU RECOVERY - ADDTL 15 MIN

## 2019-08-23 PROCEDURE — 3700000000 HC ANESTHESIA ATTENDED CARE: Performed by: INTERNAL MEDICINE

## 2019-08-23 PROCEDURE — 82962 GLUCOSE BLOOD TEST: CPT

## 2019-08-23 PROCEDURE — 82803 BLOOD GASES ANY COMBINATION: CPT

## 2019-08-23 RX ADMIN — PROPOFOL 80 MCG/KG/MIN: 10 INJECTION, EMULSION INTRAVENOUS at 21:20

## 2019-08-23 RX ADMIN — IPRATROPIUM BROMIDE AND ALBUTEROL SULFATE 1 AMPULE: .5; 3 SOLUTION RESPIRATORY (INHALATION) at 23:42

## 2019-08-23 RX ADMIN — QUETIAPINE FUMARATE 25 MG: 25 TABLET ORAL at 20:39

## 2019-08-23 RX ADMIN — PROPOFOL 80 MCG/KG/MIN: 10 INJECTION, EMULSION INTRAVENOUS at 15:30

## 2019-08-23 RX ADMIN — Medication 10 ML: at 08:06

## 2019-08-23 RX ADMIN — PROPOFOL 80 MCG/KG/MIN: 10 INJECTION, EMULSION INTRAVENOUS at 04:49

## 2019-08-23 RX ADMIN — ACETYLCYSTEINE 400 MG: 100 SOLUTION ORAL; RESPIRATORY (INHALATION) at 15:46

## 2019-08-23 RX ADMIN — PROPOFOL 80 MCG/KG/MIN: 10 INJECTION, EMULSION INTRAVENOUS at 02:21

## 2019-08-23 RX ADMIN — SODIUM CHLORIDE: 9 INJECTION, SOLUTION INTRAVENOUS at 18:36

## 2019-08-23 RX ADMIN — Medication 200 MCG/HR: at 04:48

## 2019-08-23 RX ADMIN — IPRATROPIUM BROMIDE AND ALBUTEROL SULFATE 1 AMPULE: .5; 3 SOLUTION RESPIRATORY (INHALATION) at 15:47

## 2019-08-23 RX ADMIN — PROPOFOL 80 MCG/KG/MIN: 10 INJECTION, EMULSION INTRAVENOUS at 10:02

## 2019-08-23 RX ADMIN — IPRATROPIUM BROMIDE AND ALBUTEROL SULFATE 1 AMPULE: .5; 3 SOLUTION RESPIRATORY (INHALATION) at 03:40

## 2019-08-23 RX ADMIN — PROPOFOL 80 MCG/KG/MIN: 10 INJECTION, EMULSION INTRAVENOUS at 08:03

## 2019-08-23 RX ADMIN — Medication 200 MCG/HR: at 15:29

## 2019-08-23 RX ADMIN — CLONIDINE HYDROCHLORIDE 0.2 MG: 0.2 TABLET ORAL at 22:29

## 2019-08-23 RX ADMIN — QUETIAPINE FUMARATE 25 MG: 25 TABLET ORAL at 13:37

## 2019-08-23 RX ADMIN — CLONIDINE HYDROCHLORIDE 0.2 MG: 0.2 TABLET ORAL at 05:48

## 2019-08-23 RX ADMIN — MIDAZOLAM HYDROCHLORIDE 10 MG/HR: 5 INJECTION, SOLUTION INTRAMUSCULAR; INTRAVENOUS at 10:03

## 2019-08-23 RX ADMIN — Medication 200 MCG/HR: at 20:00

## 2019-08-23 RX ADMIN — PANTOPRAZOLE SODIUM 40 MG: 40 INJECTION, POWDER, FOR SOLUTION INTRAVENOUS at 08:06

## 2019-08-23 RX ADMIN — CHLORHEXIDINE GLUCONATE 0.12% ORAL RINSE 15 ML: 1.2 LIQUID ORAL at 20:37

## 2019-08-23 RX ADMIN — ACETYLCYSTEINE 400 MG: 100 SOLUTION ORAL; RESPIRATORY (INHALATION) at 08:42

## 2019-08-23 RX ADMIN — Medication 200 MCG/HR: at 10:02

## 2019-08-23 RX ADMIN — IPRATROPIUM BROMIDE AND ALBUTEROL SULFATE 1 AMPULE: .5; 3 SOLUTION RESPIRATORY (INHALATION) at 08:41

## 2019-08-23 RX ADMIN — Medication 10 ML: at 08:07

## 2019-08-23 RX ADMIN — IPRATROPIUM BROMIDE AND ALBUTEROL SULFATE 1 AMPULE: .5; 3 SOLUTION RESPIRATORY (INHALATION) at 19:50

## 2019-08-23 RX ADMIN — SODIUM CHLORIDE 2 MCG/KG/MIN: 9 INJECTION, SOLUTION INTRAVENOUS at 13:27

## 2019-08-23 RX ADMIN — PROPOFOL 80 MCG/KG/MIN: 10 INJECTION, EMULSION INTRAVENOUS at 18:36

## 2019-08-23 RX ADMIN — ACETYLCYSTEINE 400 MG: 100 SOLUTION ORAL; RESPIRATORY (INHALATION) at 19:54

## 2019-08-23 RX ADMIN — ENOXAPARIN SODIUM 40 MG: 100 INJECTION SUBCUTANEOUS at 13:38

## 2019-08-23 RX ADMIN — CLONIDINE HYDROCHLORIDE 0.2 MG: 0.2 TABLET ORAL at 13:37

## 2019-08-23 RX ADMIN — CHLORHEXIDINE GLUCONATE 0.12% ORAL RINSE 15 ML: 1.2 LIQUID ORAL at 08:06

## 2019-08-23 RX ADMIN — PROPOFOL 80 MCG/KG/MIN: 10 INJECTION, EMULSION INTRAVENOUS at 12:58

## 2019-08-23 RX ADMIN — MIDAZOLAM HYDROCHLORIDE 10 MG/HR: 5 INJECTION, SOLUTION INTRAMUSCULAR; INTRAVENOUS at 20:43

## 2019-08-23 RX ADMIN — NAFCILLIN 12 G: 2 POWDER, FOR SOLUTION INTRAMUSCULAR; INTRAVENOUS at 20:22

## 2019-08-23 RX ADMIN — SODIUM CHLORIDE: 9 INJECTION, SOLUTION INTRAVENOUS at 04:53

## 2019-08-23 ASSESSMENT — PULMONARY FUNCTION TESTS
PIF_VALUE: 26
PIF_VALUE: 31
PIF_VALUE: 22
PIF_VALUE: 0
PIF_VALUE: 21
PIF_VALUE: 21
PIF_VALUE: 35
PIF_VALUE: 26
PIF_VALUE: 1
PIF_VALUE: 29
PIF_VALUE: 35
PIF_VALUE: 30
PIF_VALUE: 20
PIF_VALUE: 23
PIF_VALUE: 24
PIF_VALUE: 31
PIF_VALUE: 26
PIF_VALUE: 20
PIF_VALUE: 25
PIF_VALUE: 1
PIF_VALUE: 8
PIF_VALUE: 31
PIF_VALUE: 23
PIF_VALUE: 24
PIF_VALUE: 22
PIF_VALUE: 34
PIF_VALUE: 22
PIF_VALUE: 23
PIF_VALUE: 26
PIF_VALUE: 29
PIF_VALUE: 3
PIF_VALUE: 20
PIF_VALUE: 30
PIF_VALUE: 22
PIF_VALUE: 21
PIF_VALUE: 29
PIF_VALUE: 26
PIF_VALUE: 21
PIF_VALUE: 21
PIF_VALUE: 36
PIF_VALUE: 25
PIF_VALUE: 20
PIF_VALUE: 22
PIF_VALUE: 21
PIF_VALUE: 25
PIF_VALUE: 2
PIF_VALUE: 2
PIF_VALUE: 26
PIF_VALUE: 35

## 2019-08-23 ASSESSMENT — PAIN SCALES - GENERAL: PAINLEVEL_OUTOF10: 0

## 2019-08-23 NOTE — PROGRESS NOTES
08/22/19 2015   Vent Information   Vent Type 980   Vent Mode AC/VC   Vt Ordered 500 mL   Rate Set 20 bmp   Peak Flow 70 L/min   Pressure Support 0 cmH20   FiO2  60 %   Sensitivity 3   PEEP/CPAP 8   I Time/ I Time % 0 s   Humidification Source Heated wire   Humidification Temp 37   Circuit Condensation Drained   Vent Patient Data   High Peep/I Pressure 0   Peak Inspiratory Pressure 25 cmH2O   Mean Airway Pressure 16 cmH20   Rate Measured 26 br/min   Vt Exhaled 467 mL   Minute Volume 13.8 Liters   I:E Ratio 1:1.20   Dynamic Compliance 19 mL/cmH2O   Cough/Sputum   Sputum How Obtained Endotracheal;Suctioned   $Obtained Sample $Nasotracheal Suction   Cough Productive   Sputum Amount Large   Sputum Color Creamy   Tenacity Thin   Spontaneous Breathing Trial (SBT) RT Doc   Pulse 87   SpO2 96 %   Additional Respiratory  Assessments   Resp 21   Position Semi-Trotter's   Alarm Settings   High Pressure Alarm 45 cmH2O   Low Minute Volume Alarm 2.5 L/min   Apnea (secs) 20 secs   High Respiratory Rate 40 br/min   Low Exhaled Vt  250 mL   Non-Surgical Airway Endo Tracheal Tube   Placement Date/Time: 08/14/19 3259   Timeout: Patient;Procedure;Site/Side;Appropriate Equipment; Consent Confirmed;Site prepped with chlorhexidine;Correct Position  Mask Ventilation: Ventilated by mask with oral airway (2)  Placed By: In ED  Inserted b. ..    Secured at 25 cm   Measured From Lips   Secured Location Left   Secured By Commercial tube vila   Site Condition Dry

## 2019-08-23 NOTE — CARE COORDINATION
Cm received call from pt advocate, Guy Olmedo, requesting that Cm contact pt's mother about her desire to have pt possibly  transferred to another hospital. [facility] has previously been advised that in the absence of a spouse or adult children that both parents (if both are living) would need to be in agreement re; decision making for pt. Cm contacted pt's father who is listed as his emergency contact and explained the concern that pt's mother is interested in having pt transferred to another hospital and the both parents would need to be in agreement. Pt's father states that he does not believe that he can sit down and have a conference with pt's mother present. He is satisfied with pt being at [facility] at present but is also very concerned about the length of time pt has been on the vent and wanting to get pt off the vent as soon as absolutely possible. Cm explained to father that if at any point he desires transfer to another hospital for his son to please advise CM and at that time both parents would be in agreement and transfer could be pursued. Cm advised father that it is not a guarantee that another hospital will or will not accept pt. Father expressed understanding. Cm attempted to contact pt's mother, Jaylin Bowman # [phone], with voice mail message left requesting return call to discuss her concerns.

## 2019-08-23 NOTE — PROGRESS NOTES
Message sent to dr Roberto Fung:  I need to get titration parameters on pt from you. pt is currently on a paralytic to keep sedated. versed, fentanyl and propofol are to not be titrated while on paralytic.  this pt has been very wild when sedation turned down.  his father is going to be here at hospital tomorrow at 0700 to try and keep him calm. I will be here also.   will you be covering tomorrow or will dr Arun England be here for his patients in the AM?

## 2019-08-23 NOTE — PROGRESS NOTES
08/23/19 0340   Vent Information   Vent Type 980   Vent Mode AC/VC   Vt Ordered 500 mL   Rate Set 20 bmp   Peak Flow 70 L/min   Pressure Support 0 cmH20   FiO2  60 %   Sensitivity 3   PEEP/CPAP 8   I Time/ I Time % 0 s   Humidification Source Heated wire   Humidification Temp 37   Circuit Condensation Drained   Vent Patient Data   High Peep/I Pressure 0   Peak Inspiratory Pressure 31 cmH2O   Mean Airway Pressure 14 cmH20   Rate Measured 35 br/min   Vt Exhaled 288 mL   Minute Volume 9.66 Liters   I:E Ratio 1.30:1   Cough/Sputum   Sputum How Obtained Endotracheal;Suctioned   $Obtained Sample $Nasotracheal Suction   Cough Productive   Sputum Amount Moderate   Sputum Color Creamy   Tenacity Thick   Spontaneous Breathing Trial (SBT) RT Doc   Pulse 95   SpO2 94 %   Additional Respiratory  Assessments   Resp (!) 34   Position Semi-Trotter's   Alarm Settings   High Pressure Alarm 45 cmH2O   Low Minute Volume Alarm 2.5 L/min   Apnea (secs) 20 secs   High Respiratory Rate 40 br/min   Low Exhaled Vt  250 mL   Non-Surgical Airway Endo Tracheal Tube   Placement Date/Time: 08/14/19 0669   Timeout: Patient;Procedure;Site/Side;Appropriate Equipment; Consent Confirmed;Site prepped with chlorhexidine;Correct Position  Mask Ventilation: Ventilated by mask with oral airway (2)  Placed By: In ED  Inserted b. ..    Secured at 25 cm   Measured From Lips   Secured Location Left   Secured By Commercial tube vila   Site Condition Dry

## 2019-08-24 ENCOUNTER — APPOINTMENT (OUTPATIENT)
Dept: GENERAL RADIOLOGY | Age: 29
DRG: 917 | End: 2019-08-24

## 2019-08-24 LAB
ANION GAP SERPL CALCULATED.3IONS-SCNC: 11 MMOL/L (ref 4–16)
BUN BLDV-MCNC: 14 MG/DL (ref 6–23)
CALCIUM SERPL-MCNC: 8.8 MG/DL (ref 8.3–10.6)
CHLORIDE BLD-SCNC: 105 MMOL/L (ref 99–110)
CO2: 25 MMOL/L (ref 21–32)
CREAT SERPL-MCNC: 0.6 MG/DL (ref 0.9–1.3)
GFR AFRICAN AMERICAN: >60 ML/MIN/1.73M2
GFR NON-AFRICAN AMERICAN: >60 ML/MIN/1.73M2
GLUCOSE BLD-MCNC: 105 MG/DL (ref 70–99)
HBV SURFACE AB TITR SER: 152.2 {TITER}
HCT VFR BLD CALC: 40.8 % (ref 42–52)
HEMOGLOBIN: 12.9 GM/DL (ref 13.5–18)
HEPATITIS B SURFACE ANTIGEN: NON REACTIVE
LACTATE: 0.5 MMOL/L (ref 0.4–2)
MAGNESIUM: 1.7 MG/DL (ref 1.8–2.4)
MCH RBC QN AUTO: 29.5 PG (ref 27–31)
MCHC RBC AUTO-ENTMCNC: 31.6 % (ref 32–36)
MCV RBC AUTO: 93.4 FL (ref 78–100)
PDW BLD-RTO: 11.8 % (ref 11.7–14.9)
PHOSPHORUS: 3.6 MG/DL (ref 2.5–4.9)
PLATELET # BLD: 355 K/CU MM (ref 140–440)
PMV BLD AUTO: 9.7 FL (ref 7.5–11.1)
POTASSIUM SERPL-SCNC: 4.2 MMOL/L (ref 3.5–5.1)
PROCALCITONIN: 0.1
RBC # BLD: 4.37 M/CU MM (ref 4.6–6.2)
SODIUM BLD-SCNC: 141 MMOL/L (ref 135–145)
WBC # BLD: 11.9 K/CU MM (ref 4–10.5)

## 2019-08-24 PROCEDURE — 6370000000 HC RX 637 (ALT 250 FOR IP): Performed by: INTERNAL MEDICINE

## 2019-08-24 PROCEDURE — 94668 MNPJ CHEST WALL SBSQ: CPT

## 2019-08-24 PROCEDURE — 94003 VENT MGMT INPAT SUBQ DAY: CPT

## 2019-08-24 PROCEDURE — 2500000003 HC RX 250 WO HCPCS: Performed by: INTERNAL MEDICINE

## 2019-08-24 PROCEDURE — 2580000003 HC RX 258: Performed by: INTERNAL MEDICINE

## 2019-08-24 PROCEDURE — 6360000002 HC RX W HCPCS: Performed by: INTERNAL MEDICINE

## 2019-08-24 PROCEDURE — 36592 COLLECT BLOOD FROM PICC: CPT

## 2019-08-24 PROCEDURE — 2000000000 HC ICU R&B

## 2019-08-24 PROCEDURE — 84145 PROCALCITONIN (PCT): CPT

## 2019-08-24 PROCEDURE — 94640 AIRWAY INHALATION TREATMENT: CPT

## 2019-08-24 PROCEDURE — 84100 ASSAY OF PHOSPHORUS: CPT

## 2019-08-24 PROCEDURE — 83605 ASSAY OF LACTIC ACID: CPT

## 2019-08-24 PROCEDURE — 89220 SPUTUM SPECIMEN COLLECTION: CPT

## 2019-08-24 PROCEDURE — 2700000000 HC OXYGEN THERAPY PER DAY

## 2019-08-24 PROCEDURE — 31720 CLEARANCE OF AIRWAYS: CPT

## 2019-08-24 PROCEDURE — 85027 COMPLETE CBC AUTOMATED: CPT

## 2019-08-24 PROCEDURE — 99233 SBSQ HOSP IP/OBS HIGH 50: CPT | Performed by: INTERNAL MEDICINE

## 2019-08-24 PROCEDURE — 83735 ASSAY OF MAGNESIUM: CPT

## 2019-08-24 PROCEDURE — 80048 BASIC METABOLIC PNL TOTAL CA: CPT

## 2019-08-24 PROCEDURE — 71045 X-RAY EXAM CHEST 1 VIEW: CPT

## 2019-08-24 RX ORDER — MAGNESIUM SULFATE IN WATER 40 MG/ML
2 INJECTION, SOLUTION INTRAVENOUS ONCE
Status: COMPLETED | OUTPATIENT
Start: 2019-08-24 | End: 2019-08-24

## 2019-08-24 RX ORDER — ACETAMINOPHEN 650 MG/1
650 SUPPOSITORY RECTAL EVERY 4 HOURS PRN
Status: DISCONTINUED | OUTPATIENT
Start: 2019-08-24 | End: 2019-08-28 | Stop reason: HOSPADM

## 2019-08-24 RX ADMIN — PROPOFOL 80 MCG/KG/MIN: 10 INJECTION, EMULSION INTRAVENOUS at 06:09

## 2019-08-24 RX ADMIN — PROPOFOL 80 MCG/KG/MIN: 10 INJECTION, EMULSION INTRAVENOUS at 09:41

## 2019-08-24 RX ADMIN — SODIUM CHLORIDE: 9 INJECTION, SOLUTION INTRAVENOUS at 07:05

## 2019-08-24 RX ADMIN — QUETIAPINE FUMARATE 25 MG: 25 TABLET ORAL at 20:26

## 2019-08-24 RX ADMIN — NAFCILLIN 12 G: 2 POWDER, FOR SOLUTION INTRAMUSCULAR; INTRAVENOUS at 21:44

## 2019-08-24 RX ADMIN — Medication 10 ML: at 20:42

## 2019-08-24 RX ADMIN — IPRATROPIUM BROMIDE AND ALBUTEROL SULFATE 1 AMPULE: .5; 3 SOLUTION RESPIRATORY (INHALATION) at 18:57

## 2019-08-24 RX ADMIN — SODIUM CHLORIDE: 9 INJECTION, SOLUTION INTRAVENOUS at 19:59

## 2019-08-24 RX ADMIN — Medication 10 ML: at 10:37

## 2019-08-24 RX ADMIN — LISINOPRIL 5 MG: 5 TABLET ORAL at 10:36

## 2019-08-24 RX ADMIN — IPRATROPIUM BROMIDE AND ALBUTEROL SULFATE 1 AMPULE: .5; 3 SOLUTION RESPIRATORY (INHALATION) at 11:33

## 2019-08-24 RX ADMIN — PROPOFOL 80 MCG/KG/MIN: 10 INJECTION, EMULSION INTRAVENOUS at 03:15

## 2019-08-24 RX ADMIN — Medication 200 MCG/HR: at 07:14

## 2019-08-24 RX ADMIN — CLONIDINE HYDROCHLORIDE 0.2 MG: 0.2 TABLET ORAL at 06:25

## 2019-08-24 RX ADMIN — MIDAZOLAM HYDROCHLORIDE 10 MG/HR: 5 INJECTION, SOLUTION INTRAMUSCULAR; INTRAVENOUS at 07:15

## 2019-08-24 RX ADMIN — IPRATROPIUM BROMIDE AND ALBUTEROL SULFATE 1 AMPULE: .5; 3 SOLUTION RESPIRATORY (INHALATION) at 16:14

## 2019-08-24 RX ADMIN — QUETIAPINE FUMARATE 25 MG: 25 TABLET ORAL at 15:14

## 2019-08-24 RX ADMIN — QUETIAPINE FUMARATE 25 MG: 25 TABLET ORAL at 10:36

## 2019-08-24 RX ADMIN — ACETAMINOPHEN 650 MG: 650 SUPPOSITORY RECTAL at 06:16

## 2019-08-24 RX ADMIN — CLONIDINE HYDROCHLORIDE 0.2 MG: 0.2 TABLET ORAL at 15:14

## 2019-08-24 RX ADMIN — ACETAMINOPHEN 650 MG: 650 SUPPOSITORY RECTAL at 17:45

## 2019-08-24 RX ADMIN — Medication 200 MCG/HR: at 01:40

## 2019-08-24 RX ADMIN — PROPOFOL 80 MCG/KG/MIN: 10 INJECTION, EMULSION INTRAVENOUS at 00:21

## 2019-08-24 RX ADMIN — CLONIDINE HYDROCHLORIDE 0.2 MG: 0.2 TABLET ORAL at 21:45

## 2019-08-24 RX ADMIN — MAGNESIUM SULFATE HEPTAHYDRATE 2 G: 40 INJECTION, SOLUTION INTRAVENOUS at 13:44

## 2019-08-24 RX ADMIN — IPRATROPIUM BROMIDE AND ALBUTEROL SULFATE 1 AMPULE: .5; 3 SOLUTION RESPIRATORY (INHALATION) at 09:08

## 2019-08-24 RX ADMIN — Medication 10 ML: at 20:27

## 2019-08-24 RX ADMIN — SODIUM CHLORIDE 3 MCG/KG/MIN: 9 INJECTION, SOLUTION INTRAVENOUS at 06:10

## 2019-08-24 RX ADMIN — ENOXAPARIN SODIUM 40 MG: 100 INJECTION SUBCUTANEOUS at 10:36

## 2019-08-24 RX ADMIN — IPRATROPIUM BROMIDE AND ALBUTEROL SULFATE 1 AMPULE: .5; 3 SOLUTION RESPIRATORY (INHALATION) at 03:40

## 2019-08-24 ASSESSMENT — PULMONARY FUNCTION TESTS
PIF_VALUE: 23
PIF_VALUE: 17
PIF_VALUE: 27

## 2019-08-24 ASSESSMENT — PAIN SCALES - GENERAL
PAINLEVEL_OUTOF10: 0
PAINLEVEL_OUTOF10: 2
PAINLEVEL_OUTOF10: 0

## 2019-08-24 NOTE — OP NOTE
[address], [address] [place] [address]                                OPERATIVE REPORT    PATIENT NAME: Lazarus Vazquez               :        1990  MED REC NO:   [id number]                          ROOM:       2102  ACCOUNT NO:   [de-identified]                           ADMIT DATE: 2019  PROVIDER:     Shireen Haas MD    DATE OF PROCEDURE:  2019    BRONCHOSCOPY REPORT    INDICATION FOR BRONCHOSCOPY:  Copious secretions for bronchial toilet  and lavage. The patient is requiring high FiO2 and had right lower lobe  atelectasis, which has improved, but still requiring significant FiO2. DESCRIPTION OF PROCEDURE:  The patient is intubated and the bronchoscope  was passed through the endotracheal tube. The anesthesia was monitored  by anesthetist.  Scope was passed through the endotracheal tube and  passed down. The lower part of the trachea was normal.  Ambika was  normal and sharp. The right mainstem had copious secretions, which were  suctioned out. Right middle lobe and lower lobe had copious secretions  and mucous plugs which were suctioned out. The right upper lobe had  scant secretions which were suctioned out. The scope was then passed on  the left side. The left lower lobe had mucus plugs which were suctioned  out. The left upper lobe had scant secretions, which were suctioned  out. Thorough bronchial toilet was performed on both the sides. Subsequently, bronchoalveolar lavage was done from the right lower lobe. After the lavage was done and toilet was completed, bronchial tree was  re-examined. No high-grade endobronchial lesion was seen. There was  some endobronchitis in the right lower lobe. The scope was then  withdrawn. The patient tolerated the procedure well.   The patient was  monitored electrocardiographically and oximeter throughout the procedure  and the oximeter stayed greater than

## 2019-08-24 NOTE — PROGRESS NOTES
Subsequently, bronchoalveolar lavage was done from the right lower lobe. After the lavage was done and toilet was completed, bronchial tree was  re-examined. No high-grade endobronchial lesion was seen. There was  some endobronchitis in the right lower lobe. The scope was then  withdrawn. The patient tolerated the procedure well.   The patient was  monitored electrocardiographically and oximeter throughout the procedure  and the oximeter stayed greater than 90% all the time.         Electronically signed by Ronak Foster MD on 8/24/2019 at 1:33 PM

## 2019-08-24 NOTE — PROGRESS NOTES
08/24/19 0340   Vent Information   Vent Type 980   Vent Mode AC/VC   Vt Ordered 500 mL   Rate Set 20 bmp   Peak Flow 70 L/min   Pressure Support 0 cmH20   FiO2  50 %   Sensitivity 3   PEEP/CPAP 8   I Time/ I Time % 0 s   Humidification Source Heated wire   Humidification Temp 36.8   Circuit Condensation Drained   Vent Patient Data   High Peep/I Pressure 0   Peak Inspiratory Pressure 27 cmH2O   Mean Airway Pressure 15 cmH20   Rate Measured 21 br/min   Vt Exhaled 514 mL   Minute Volume 10.9 Liters   I:E Ratio 1:2.10   Cough/Sputum   Sputum How Obtained Endotracheal;Suctioned   $Obtained Sample $Nasotracheal Suction   Sputum Amount Small   Sputum Color Cloudy   Tenacity Thick   Spontaneous Breathing Trial (SBT) RT Doc   Pulse 105   SpO2 97 %   Additional Respiratory  Assessments   Resp 23   Position Semi-Trotter's   Alarm Settings   High Pressure Alarm 45 cmH2O   Low Minute Volume Alarm 2.5 L/min   Apnea (secs) 20 secs   High Respiratory Rate 40 br/min   Low Exhaled Vt  250 mL   Non-Surgical Airway Endo Tracheal Tube   Placement Date/Time: 08/14/19 6569   Timeout: Patient;Procedure;Site/Side;Appropriate Equipment; Consent Confirmed;Site prepped with chlorhexidine;Correct Position  Mask Ventilation: Ventilated by mask with oral airway (2)  Placed By: In ED  Inserted b. ..    Secured at 25 cm   Measured From Lips   Secured Location Left   Secured By Commercial tube vila   Site Condition Dry

## 2019-08-24 NOTE — PROGRESS NOTES
08/23/19 1949   Vent Information   Vent Type 980   Vent Mode AC/VC   Vt Ordered 500 mL   Rate Set 20 bmp   Peak Flow 70 L/min   Pressure Support 0 cmH20   FiO2  50 %   Sensitivity 3   PEEP/CPAP 8   I Time/ I Time % 0 s   Humidification Source Heated wire   Humidification Temp 37   Circuit Condensation Drained   Vent Patient Data   High Peep/I Pressure 0   Peak Inspiratory Pressure 26 cmH2O   Mean Airway Pressure 14 cmH20   Rate Measured 20 br/min   Vt Exhaled 497 mL   Minute Volume 9.91 Liters   I:E Ratio 1:2.10   Plateau Pressure 21 RBS35   Static Compliance 32 mL/cmH2O   Dynamic Compliance 22 mL/cmH2O   Spontaneous Breathing Trial (SBT) RT Doc   Pulse 69   SpO2 94 %   Additional Respiratory  Assessments   Resp 20   Position Semi-Trotter's   Alarm Settings   High Pressure Alarm 45 cmH2O   Low Minute Volume Alarm 2.5 L/min   Apnea (secs) 20 secs   High Respiratory Rate 40 br/min   Low Exhaled Vt  250 mL   Non-Surgical Airway Endo Tracheal Tube   Placement Date/Time: 08/14/19 4371   Timeout: Patient;Procedure;Site/Side;Appropriate Equipment; Consent Confirmed;Site prepped with chlorhexidine;Correct Position  Mask Ventilation: Ventilated by mask with oral airway (2)  Placed By: In ED  Inserted b. ..    Secured at 25 cm   Measured From Lips   Secured Location Left   Secured By Commercial tube vila   Site Condition Dry

## 2019-08-25 ENCOUNTER — APPOINTMENT (OUTPATIENT)
Dept: GENERAL RADIOLOGY | Age: 29
DRG: 917 | End: 2019-08-25

## 2019-08-25 LAB
ANION GAP SERPL CALCULATED.3IONS-SCNC: 15 MMOL/L (ref 4–16)
BUN BLDV-MCNC: 21 MG/DL (ref 6–23)
CALCIUM SERPL-MCNC: 8.6 MG/DL (ref 8.3–10.6)
CHLORIDE BLD-SCNC: 106 MMOL/L (ref 99–110)
CO2: 21 MMOL/L (ref 21–32)
CREAT SERPL-MCNC: 0.6 MG/DL (ref 0.9–1.3)
CULTURE: ABNORMAL
GFR AFRICAN AMERICAN: >60 ML/MIN/1.73M2
GFR NON-AFRICAN AMERICAN: >60 ML/MIN/1.73M2
GLUCOSE BLD-MCNC: 128 MG/DL (ref 70–99)
GRAM SMEAR: ABNORMAL
HAV AB SERPL IA-ACNC: NEGATIVE
HAV AB SERPL IA-ACNC: NORMAL
HCT VFR BLD CALC: 39.2 % (ref 42–52)
HEMOGLOBIN: 12.8 GM/DL (ref 13.5–18)
HEPATITIS B CORE TOTAL ANTIBODY: NEGATIVE
HEPATITIS B CORE TOTAL ANTIBODY: NORMAL
Lab: ABNORMAL
Lab: ABNORMAL
MAGNESIUM: 2 MG/DL (ref 1.8–2.4)
MCH RBC QN AUTO: 29.5 PG (ref 27–31)
MCHC RBC AUTO-ENTMCNC: 32.7 % (ref 32–36)
MCV RBC AUTO: 90.3 FL (ref 78–100)
PDW BLD-RTO: 11.5 % (ref 11.7–14.9)
PHOSPHORUS: 2.2 MG/DL (ref 2.5–4.9)
PLATELET # BLD: 453 K/CU MM (ref 140–440)
PMV BLD AUTO: 9.4 FL (ref 7.5–11.1)
POTASSIUM SERPL-SCNC: 3.1 MMOL/L (ref 3.5–5.1)
RBC # BLD: 4.34 M/CU MM (ref 4.6–6.2)
SODIUM BLD-SCNC: 142 MMOL/L (ref 135–145)
SPECIMEN: ABNORMAL
SPECIMEN: ABNORMAL
WBC # BLD: 19 K/CU MM (ref 4–10.5)

## 2019-08-25 PROCEDURE — 2580000003 HC RX 258: Performed by: INTERNAL MEDICINE

## 2019-08-25 PROCEDURE — 84100 ASSAY OF PHOSPHORUS: CPT

## 2019-08-25 PROCEDURE — 6370000000 HC RX 637 (ALT 250 FOR IP): Performed by: INTERNAL MEDICINE

## 2019-08-25 PROCEDURE — 71045 X-RAY EXAM CHEST 1 VIEW: CPT

## 2019-08-25 PROCEDURE — 36592 COLLECT BLOOD FROM PICC: CPT

## 2019-08-25 PROCEDURE — 99233 SBSQ HOSP IP/OBS HIGH 50: CPT | Performed by: INTERNAL MEDICINE

## 2019-08-25 PROCEDURE — 6360000002 HC RX W HCPCS: Performed by: INTERNAL MEDICINE

## 2019-08-25 PROCEDURE — 2500000003 HC RX 250 WO HCPCS: Performed by: INTERNAL MEDICINE

## 2019-08-25 PROCEDURE — 2060000000 HC ICU INTERMEDIATE R&B

## 2019-08-25 PROCEDURE — 85027 COMPLETE CBC AUTOMATED: CPT

## 2019-08-25 PROCEDURE — 83735 ASSAY OF MAGNESIUM: CPT

## 2019-08-25 PROCEDURE — 80048 BASIC METABOLIC PNL TOTAL CA: CPT

## 2019-08-25 PROCEDURE — 94640 AIRWAY INHALATION TREATMENT: CPT

## 2019-08-25 RX ADMIN — NAFCILLIN 12 G: 2 POWDER, FOR SOLUTION INTRAMUSCULAR; INTRAVENOUS at 20:30

## 2019-08-25 RX ADMIN — QUETIAPINE FUMARATE 25 MG: 25 TABLET ORAL at 09:24

## 2019-08-25 RX ADMIN — CLONIDINE HYDROCHLORIDE 0.2 MG: 0.2 TABLET ORAL at 07:06

## 2019-08-25 RX ADMIN — ACETYLCYSTEINE 400 MG: 100 SOLUTION ORAL; RESPIRATORY (INHALATION) at 16:14

## 2019-08-25 RX ADMIN — CLONIDINE HYDROCHLORIDE 0.2 MG: 0.2 TABLET ORAL at 16:41

## 2019-08-25 RX ADMIN — CLONIDINE HYDROCHLORIDE 0.2 MG: 0.2 TABLET ORAL at 21:31

## 2019-08-25 RX ADMIN — ENOXAPARIN SODIUM 40 MG: 100 INJECTION SUBCUTANEOUS at 09:24

## 2019-08-25 RX ADMIN — Medication 10 ML: at 21:31

## 2019-08-25 RX ADMIN — QUETIAPINE FUMARATE 25 MG: 25 TABLET ORAL at 21:31

## 2019-08-25 RX ADMIN — IPRATROPIUM BROMIDE AND ALBUTEROL SULFATE 1 AMPULE: .5; 3 SOLUTION RESPIRATORY (INHALATION) at 12:16

## 2019-08-25 RX ADMIN — QUETIAPINE FUMARATE 25 MG: 25 TABLET ORAL at 16:40

## 2019-08-25 RX ADMIN — IPRATROPIUM BROMIDE AND ALBUTEROL SULFATE 1 AMPULE: .5; 3 SOLUTION RESPIRATORY (INHALATION) at 16:14

## 2019-08-25 RX ADMIN — SODIUM CHLORIDE: 9 INJECTION, SOLUTION INTRAVENOUS at 22:24

## 2019-08-25 RX ADMIN — IPRATROPIUM BROMIDE AND ALBUTEROL SULFATE 1 AMPULE: .5; 3 SOLUTION RESPIRATORY (INHALATION) at 01:00

## 2019-08-25 RX ADMIN — SODIUM CHLORIDE: 9 INJECTION, SOLUTION INTRAVENOUS at 09:34

## 2019-08-25 RX ADMIN — Medication 10 ML: at 09:24

## 2019-08-25 RX ADMIN — LISINOPRIL 5 MG: 5 TABLET ORAL at 09:24

## 2019-08-25 ASSESSMENT — PAIN SCALES - GENERAL: PAINLEVEL_OUTOF10: 0

## 2019-08-25 NOTE — PROGRESS NOTES
failure       1. CPT  2. ICS  3. OOB  4. Nebs  5. C/w present management  6. Transfer to the floor  7. IV fluids KVO  8. Advance oral intake as tolerated  9. Abx  10. F/u C&S  No follow-ups on file.     Electronically signed by Monica Benito MD on 8/25/2019 at 11:13 AM

## 2019-08-26 ENCOUNTER — APPOINTMENT (OUTPATIENT)
Dept: GENERAL RADIOLOGY | Age: 29
DRG: 917 | End: 2019-08-26

## 2019-08-26 LAB
ANION GAP SERPL CALCULATED.3IONS-SCNC: 11 MMOL/L (ref 4–16)
BUN BLDV-MCNC: 18 MG/DL (ref 6–23)
CALCIUM SERPL-MCNC: 8.9 MG/DL (ref 8.3–10.6)
CHLORIDE BLD-SCNC: 107 MMOL/L (ref 99–110)
CO2: 26 MMOL/L (ref 21–32)
CREAT SERPL-MCNC: 0.6 MG/DL (ref 0.9–1.3)
CULTURE: NORMAL
CULTURE: NORMAL
GFR AFRICAN AMERICAN: >60 ML/MIN/1.73M2
GFR NON-AFRICAN AMERICAN: >60 ML/MIN/1.73M2
GLUCOSE BLD-MCNC: 126 MG/DL (ref 70–99)
HCT VFR BLD CALC: 37.6 % (ref 42–52)
HEMOGLOBIN: 12.3 GM/DL (ref 13.5–18)
Lab: NORMAL
Lab: NORMAL
MAGNESIUM: 2 MG/DL (ref 1.8–2.4)
MCH RBC QN AUTO: 29.8 PG (ref 27–31)
MCHC RBC AUTO-ENTMCNC: 32.7 % (ref 32–36)
MCV RBC AUTO: 91 FL (ref 78–100)
PDW BLD-RTO: 11.8 % (ref 11.7–14.9)
PHOSPHORUS: 2.9 MG/DL (ref 2.5–4.9)
PLATELET # BLD: 436 K/CU MM (ref 140–440)
PMV BLD AUTO: 9.5 FL (ref 7.5–11.1)
POTASSIUM SERPL-SCNC: ABNORMAL MMOL/L (ref 3.5–5.1)
POTASSIUM SERPL-SCNC: ABNORMAL MMOL/L (ref 3.5–5.1)
RBC # BLD: 4.13 M/CU MM (ref 4.6–6.2)
SODIUM BLD-SCNC: 144 MMOL/L (ref 135–145)
SPECIMEN: NORMAL
SPECIMEN: NORMAL
WBC # BLD: 17.7 K/CU MM (ref 4–10.5)

## 2019-08-26 PROCEDURE — 36592 COLLECT BLOOD FROM PICC: CPT

## 2019-08-26 PROCEDURE — 85027 COMPLETE CBC AUTOMATED: CPT

## 2019-08-26 PROCEDURE — 2580000003 HC RX 258: Performed by: INTERNAL MEDICINE

## 2019-08-26 PROCEDURE — 6360000002 HC RX W HCPCS: Performed by: INTERNAL MEDICINE

## 2019-08-26 PROCEDURE — 83735 ASSAY OF MAGNESIUM: CPT

## 2019-08-26 PROCEDURE — 84132 ASSAY OF SERUM POTASSIUM: CPT

## 2019-08-26 PROCEDURE — 6370000000 HC RX 637 (ALT 250 FOR IP): Performed by: INTERNAL MEDICINE

## 2019-08-26 PROCEDURE — 84100 ASSAY OF PHOSPHORUS: CPT

## 2019-08-26 PROCEDURE — 71046 X-RAY EXAM CHEST 2 VIEWS: CPT

## 2019-08-26 PROCEDURE — 80048 BASIC METABOLIC PNL TOTAL CA: CPT

## 2019-08-26 PROCEDURE — 2500000003 HC RX 250 WO HCPCS: Performed by: INTERNAL MEDICINE

## 2019-08-26 PROCEDURE — 2060000000 HC ICU INTERMEDIATE R&B

## 2019-08-26 RX ORDER — POTASSIUM CHLORIDE 1.5 G/1.77G
40 POWDER, FOR SOLUTION ORAL PRN
Status: DISCONTINUED | OUTPATIENT
Start: 2019-08-26 | End: 2019-08-28 | Stop reason: HOSPADM

## 2019-08-26 RX ORDER — POTASSIUM CHLORIDE 7.45 MG/ML
10 INJECTION INTRAVENOUS PRN
Status: DISCONTINUED | OUTPATIENT
Start: 2019-08-26 | End: 2019-08-28 | Stop reason: HOSPADM

## 2019-08-26 RX ORDER — DICYCLOMINE HCL 20 MG
20 TABLET ORAL EVERY 6 HOURS PRN
Status: DISCONTINUED | OUTPATIENT
Start: 2019-08-26 | End: 2019-08-28 | Stop reason: HOSPADM

## 2019-08-26 RX ORDER — IPRATROPIUM BROMIDE AND ALBUTEROL SULFATE 2.5; .5 MG/3ML; MG/3ML
1 SOLUTION RESPIRATORY (INHALATION) EVERY 4 HOURS PRN
Status: DISCONTINUED | OUTPATIENT
Start: 2019-08-26 | End: 2019-08-28 | Stop reason: HOSPADM

## 2019-08-26 RX ORDER — HYDROXYZINE PAMOATE 25 MG/1
50 CAPSULE ORAL EVERY 8 HOURS PRN
Status: DISCONTINUED | OUTPATIENT
Start: 2019-08-26 | End: 2019-08-28 | Stop reason: HOSPADM

## 2019-08-26 RX ORDER — ACETYLCYSTEINE 100 MG/ML
4 SOLUTION ORAL; RESPIRATORY (INHALATION) 3 TIMES DAILY PRN
Status: DISCONTINUED | OUTPATIENT
Start: 2019-08-26 | End: 2019-08-28 | Stop reason: HOSPADM

## 2019-08-26 RX ORDER — CLONIDINE HYDROCHLORIDE 0.1 MG/1
0.1 TABLET ORAL 3 TIMES DAILY PRN
Status: DISCONTINUED | OUTPATIENT
Start: 2019-08-26 | End: 2019-08-26

## 2019-08-26 RX ORDER — BUPRENORPHINE 2 MG/1
2 TABLET SUBLINGUAL PRN
Status: DISCONTINUED | OUTPATIENT
Start: 2019-08-26 | End: 2019-08-26

## 2019-08-26 RX ORDER — POTASSIUM CHLORIDE 20 MEQ/1
40 TABLET, EXTENDED RELEASE ORAL PRN
Status: DISCONTINUED | OUTPATIENT
Start: 2019-08-26 | End: 2019-08-28 | Stop reason: HOSPADM

## 2019-08-26 RX ORDER — PROMETHAZINE HYDROCHLORIDE 25 MG/1
25 TABLET ORAL EVERY 6 HOURS PRN
Status: DISCONTINUED | OUTPATIENT
Start: 2019-08-26 | End: 2019-08-28 | Stop reason: HOSPADM

## 2019-08-26 RX ORDER — BUPRENORPHINE 2 MG/1
2 TABLET SUBLINGUAL PRN
Status: DISCONTINUED | OUTPATIENT
Start: 2019-08-26 | End: 2019-08-28 | Stop reason: HOSPADM

## 2019-08-26 RX ORDER — IBUPROFEN 400 MG/1
800 TABLET ORAL EVERY 8 HOURS PRN
Status: DISCONTINUED | OUTPATIENT
Start: 2019-08-26 | End: 2019-08-28 | Stop reason: HOSPADM

## 2019-08-26 RX ORDER — CLONIDINE HYDROCHLORIDE 0.2 MG/1
0.2 TABLET ORAL 3 TIMES DAILY
Status: DISCONTINUED | OUTPATIENT
Start: 2019-08-26 | End: 2019-08-26

## 2019-08-26 RX ORDER — CLONIDINE HYDROCHLORIDE 0.1 MG/1
0.1 TABLET ORAL PRN
Status: DISCONTINUED | OUTPATIENT
Start: 2019-08-26 | End: 2019-08-28 | Stop reason: HOSPADM

## 2019-08-26 RX ADMIN — ONDANSETRON 4 MG: 2 INJECTION INTRAMUSCULAR; INTRAVENOUS at 13:53

## 2019-08-26 RX ADMIN — POTASSIUM CHLORIDE 20 MEQ: 400 INJECTION, SOLUTION INTRAVENOUS at 06:47

## 2019-08-26 RX ADMIN — POTASSIUM CHLORIDE 10 MEQ: 7.46 INJECTION, SOLUTION INTRAVENOUS at 21:42

## 2019-08-26 RX ADMIN — BUPRENORPHINE HCL 2 MG: 2 TABLET SUBLINGUAL at 19:38

## 2019-08-26 RX ADMIN — Medication 10 ML: at 09:12

## 2019-08-26 RX ADMIN — BUPRENORPHINE HCL 2 MG: 2 TABLET SUBLINGUAL at 21:42

## 2019-08-26 RX ADMIN — LORAZEPAM 1 MG: 2 INJECTION INTRAMUSCULAR; INTRAVENOUS at 15:03

## 2019-08-26 RX ADMIN — ZIPRASIDONE MESYLATE 20 MG: 20 INJECTION, POWDER, LYOPHILIZED, FOR SOLUTION INTRAMUSCULAR at 15:29

## 2019-08-26 RX ADMIN — LISINOPRIL 5 MG: 5 TABLET ORAL at 09:11

## 2019-08-26 RX ADMIN — Medication 10 ML: at 20:58

## 2019-08-26 RX ADMIN — SODIUM CHLORIDE: 9 INJECTION, SOLUTION INTRAVENOUS at 12:38

## 2019-08-26 RX ADMIN — QUETIAPINE FUMARATE 25 MG: 25 TABLET ORAL at 15:31

## 2019-08-26 RX ADMIN — POTASSIUM CHLORIDE 10 MEQ: 7.46 INJECTION, SOLUTION INTRAVENOUS at 17:23

## 2019-08-26 RX ADMIN — QUETIAPINE FUMARATE 25 MG: 25 TABLET ORAL at 09:11

## 2019-08-26 RX ADMIN — CLONIDINE HYDROCHLORIDE 0.2 MG: 0.2 TABLET ORAL at 06:03

## 2019-08-26 RX ADMIN — CLONIDINE HYDROCHLORIDE 0.1 MG: 0.1 TABLET ORAL at 21:41

## 2019-08-26 RX ADMIN — POTASSIUM CHLORIDE 10 MEQ: 7.46 INJECTION, SOLUTION INTRAVENOUS at 19:03

## 2019-08-26 RX ADMIN — CLONIDINE HYDROCHLORIDE 0.1 MG: 0.1 TABLET ORAL at 19:38

## 2019-08-26 RX ADMIN — POTASSIUM CHLORIDE 20 MEQ: 400 INJECTION, SOLUTION INTRAVENOUS at 08:16

## 2019-08-26 RX ADMIN — POTASSIUM CHLORIDE 20 MEQ: 400 INJECTION, SOLUTION INTRAVENOUS at 10:22

## 2019-08-26 RX ADMIN — POTASSIUM CHLORIDE 10 MEQ: 7.46 INJECTION, SOLUTION INTRAVENOUS at 23:00

## 2019-08-26 RX ADMIN — ONDANSETRON 4 MG: 2 INJECTION INTRAMUSCULAR; INTRAVENOUS at 01:38

## 2019-08-26 RX ADMIN — CLONIDINE HYDROCHLORIDE 0.2 MG: 0.2 TABLET ORAL at 16:46

## 2019-08-26 RX ADMIN — Medication 10 ML: at 21:00

## 2019-08-26 RX ADMIN — NAFCILLIN 12 G: 2 POWDER, FOR SOLUTION INTRAMUSCULAR; INTRAVENOUS at 20:50

## 2019-08-26 RX ADMIN — ENOXAPARIN SODIUM 40 MG: 100 INJECTION SUBCUTANEOUS at 09:11

## 2019-08-26 RX ADMIN — QUETIAPINE FUMARATE 25 MG: 25 TABLET ORAL at 20:59

## 2019-08-26 RX ADMIN — POTASSIUM CHLORIDE 10 MEQ: 7.46 INJECTION, SOLUTION INTRAVENOUS at 21:02

## 2019-08-26 ASSESSMENT — PAIN SCALES - GENERAL
PAINLEVEL_OUTOF10: 5
PAINLEVEL_OUTOF10: 0
PAINLEVEL_OUTOF10: 0

## 2019-08-26 NOTE — PLAN OF CARE
Nutrition Problem: Inadequate oral intake  Intervention: Food and/or Nutrient Delivery: Continue current diet  Nutritional Goals: pt will consume greater than 75% of his meals

## 2019-08-26 NOTE — PROGRESS NOTES
Nutrition Assessment    Type and Reason for Visit: Reassess    Nutrition Recommendations:   · Continue current diet    Nutrition Assessment: Pt is now extubated and is on a general diet. He has been consuming 26-50% of his meals. Will order supplements and continue to follow. Malnutrition Assessment:  · Malnutrition Status: At risk for malnutrition  · Context: Social or environmental circumstances  · Findings of the 6 clinical characteristics of malnutrition (Minimum of 2 out of 6 clinical characteristics is required to make the diagnosis of moderate or severe Protein Calorie Malnutrition based on AND/ASPEN Guidelines):  1. Energy Intake-Greater than 75% of estimated energy requirement, Greater than or equal to 3 months    2. Weight Loss-No significant weight loss, in 6 months  3. Fat Loss-No significant subcutaneous fat loss, Orbital  4. Muscle Loss-No significant muscle mass loss, Clavicles (pectoralis and deltoids)  5. Fluid Accumulation-No significant fluid accumulation, Extremities  6.  Strength-Not measured    Nutrition Risk Level:  Moderate    Nutrient Needs:  · Estimated Daily Total Kcal: 9958-6996 based on [address] jeor  · Estimated Daily Protein (g): 81-97 based on 1-1.2 g/kg/IBW  · Estimated Daily Total Fluid (ml/day): 3559-6522 based on 1 mL/kcal    Nutrition Diagnosis:   · Problem: Inadequate oral intake  · Etiology: related to Acute injury/trauma     Signs and symptoms:  as evidenced by Intake 25-50%    Objective Information:  · Wound Type: None  · Current Nutrition Therapies:  · Oral Diet Orders: General   · Oral Diet intake: 26-50%  · Oral Nutrition Supplement (ONS) Orders: None  · Anthropometric Measures:  · Ht: 6' (182.9 cm)   · Current Body Wt: 166 lb (75.3 kg)  · Admission Body Wt: 177 lb (80.3 kg)  · Usual Body Wt: 170 lb (77.1 kg)  · % Weight Change: none noted  · Ideal Body Wt: 178 lb (80.7 kg), % Ideal Body 93%  · BMI Classification: BMI 18.5 - 24.9 Normal Weight    Nutrition

## 2019-08-26 NOTE — PLAN OF CARE
Patient was on fentanyl infusion for sedation while in the ICU up until 08/24/19. He is starting to experience withdrawal symptoms. Will start the COWS protocol.      Roz Giron

## 2019-08-26 NOTE — PROGRESS NOTES
[facility]    Hospitalist Progress Note      Name:  Marcine Boast /Age/Sex: 1990  (30 y.o. male)   MRN & CSN:  [id number] & [id number] Admission Date/Time: 2019 12:41 PM   Location:  -A PCP: No primary care provider on file. Hospital Day: 13    Assessment and Plan:   Marcine Boast is a 30 y.o.  male with past medical history of drug abuse - multi-substance- who presented with unresponsiveness at home.      # Acute toxic encephalopathy - Much improved.     # Acute hypoxic respiratory failure -  Much improved. Intubated on  in ED. Extubated 2019. He had bronchoscopy on 2019 due to persistent right lower lobe collapse. Bronchoscopy revealed copious secretions and mucous plug in the right middle and lower lobe which was suctioned out, there were mucous plugs in the left lower lobe too. Being treated with Mucomyst and vest therapy. Chest x-ray shows reexpansion of the right lower lobe. # MSSA VAP on treatment  - improving. Patient reportedly vomited several times at home before admission. Procal 0.096. MRSA nasal screen negative. Sputum culture  grew MSSA (rare growth). Was initially on Unasyn for aspiration but was then changed to Vancomycin and Cefepime on  for probable VAP. Vancomycin was discontinued on  and cefepime continued because MRSA nasal screen was negative. He developed low grade fever and leukocytosis on 19 so vancomycin was restarted. Sputum culture  grew MSSA (moderate growth) so ID recommend stopping Vanco and cefepime and starting Naficillin ( -). BAL culture 2019 also MSSA. Blood culture 19,  and  NGTD. Infectious disease to determine duration of treatment and oral antibiotics at discharge. # Severe Agitation - resolved. Initially was sedated with propofol, Versed and fentanyl and required Nimbex. Now off all sedative medicines.  On scheduled Seroquel to help with agitation -does not have to be continued at discharge. # Physical deconditioning: due to prolonged stay in the ICU. Will get PT/OT assessment for postacute placement. # Multi-substance abuse   - UDS positive for cocaine, benzo, opiates, PCP. Patient had repeated rehab admissions with repeated relapses. -We will DC fecal management system and Polk catheter. Will transfer to regular nursing floor. Diet DIET GENERAL;   DVT Prophylaxis [x] Lovenox, []  Heparin, [] SCDs, []No VTE prophylaxis, patient ambulating   GI Prophylaxis [] PPI, [] H2 Blocker, [] No GI prophylaxis, patient is receiving diet/Tube Feeds   Code Status Full Code   Disposition Pending PT/OT assessment. He will likely need rehabilitation in a facility. MDM [] Low, [] Moderate,[x]  High     History of Present Illness: Subjective     Patient Seen & Examined at the bedside. Chief complaint: encephalopathy. He continues to do well on room air. He is awake alert and appropriately conversational.  He is hoping to go home soon. He is still very weak. He remains afebrile. NG tube is out. Ten point ROS negative except as above. Objective: Intake/Output Summary (Last 24 hours) at 8/26/2019 1220  Last data filed at 8/26/2019 1117  Gross per 24 hour   Intake 1850.05 ml   Output 4625 ml   Net -2774.95 ml      Vitals:   Vitals:    08/26/19 0911   BP: 139/83   Pulse:    Resp:    Temp:    SpO2:      Physical Exam:    GEN ; awake and alert. Sitting in chair. HENT NGT is out. RESP good air entry bilaterally, no wheezes, faint crackles on the right side. CARDIO/VASC -S1/S2 auscultated. Regular rate without appreciable murmurs, rubs, or gallops. Peripheral pulses equal bilaterally and palpable. No peripheral edema. GI Abdomen is soft without significant tenderness, masses, or guarding. Bowel sounds are normoactive. Fecal management system is present.  Polk catheter is present. MSK No gross joint deformities.  Spontaneous movement of all

## 2019-08-26 NOTE — PROGRESS NOTES
Pulmonary and Critical Care  Progress Note    Subjective: The patient has improved  Shortness of breath none  Chest pain none  Addressing respiratory complaints Patient is negative for  hemoptysis and cyanosis  CONSTITUTIONAL:  negative for fevers and chills      Past Medical History:     has a past medical history of Hepatitis C, Hypertension, and Tourette's.   has a past surgical history that includes Arm Surgery (Left, 08/23/2016) and bronchoscopy (N/A, 8/23/2019). reports that he has been smoking cigarettes. He has been smoking about 1.00 pack per day. He has never used smokeless tobacco. He reports that he has current or past drug history. He reports that he does not drink alcohol. Family history:  family history is not on file. No Known Allergies  Social History:    Reviewed; no changes    Objective:   PHYSICAL EXAM:        VITALS:  /83   Pulse 77   Temp 99.4 °F (37.4 °C) (Rectal)   Resp 17   Ht 6' (1.829 m)   Wt 166 lb 0.1 oz (75.3 kg)   SpO2 97%   BMI 22.51 kg/m²     24HR INTAKE/OUTPUT:      Intake/Output Summary (Last 24 hours) at 8/26/2019 1032  Last data filed at 8/26/2019 1336  Gross per 24 hour   Intake 1850.05 ml   Output 3025 ml   Net -1174.95 ml       CONSTITUTIONAL:  awake, alert, cooperative, no apparent distress, and appears stated age  LUNGS:  decreased breath sounds  CARDIOVASCULAR:  normal S1 and S2 and negative JVD  ABD:Abdomen soft, non-tender. BS normal. No masses,  No organomegaly  NEURO:Alert and oriented x3. Gait normal. Reflexes and motor strength normal and symmetric. Cranial nerves 2-12 and sensation grossly intact.   DATA:    CBC:  Recent Labs     08/24/19  0550 08/25/19  0550 08/26/19  0430   WBC 11.9* 19.0* 17.7*   RBC 4.37* 4.34* 4.13*   HGB 12.9* 12.8* 12.3*   HCT 40.8* 39.2* 37.6*    453* 436   MCV 93.4 90.3 91.0   MCH 29.5 29.5 29.8   MCHC 31.6* 32.7 32.7   RDW 11.8 11.5* 11.8      BMP:  Recent Labs     08/24/19  0550 08/25/19  0550 08/26/19  0430   NA

## 2019-08-27 LAB
ALBUMIN SERPL-MCNC: 3.3 GM/DL (ref 3.4–5)
ALP BLD-CCNC: 61 IU/L (ref 40–129)
ALT SERPL-CCNC: 24 U/L (ref 10–40)
ANION GAP SERPL CALCULATED.3IONS-SCNC: 9 MMOL/L (ref 4–16)
ANISOCYTOSIS: ABNORMAL
AST SERPL-CCNC: 16 IU/L (ref 15–37)
ATYPICAL LYMPHOCYTE ABSOLUTE COUNT: ABNORMAL
BANDED NEUTROPHILS ABSOLUTE COUNT: 0.56 K/CU MM
BANDED NEUTROPHILS RELATIVE PERCENT: 4 % (ref 5–11)
BASOPHILS ABSOLUTE: 0.1 K/CU MM
BASOPHILS RELATIVE PERCENT: 1 % (ref 0–1)
BASOPHILS RELATIVE PERCENT: ABNORMAL % (ref 0–1)
BILIRUB SERPL-MCNC: 0.4 MG/DL (ref 0–1)
BILIRUBIN DIRECT: 0.2 MG/DL (ref 0–0.3)
BILIRUBIN, INDIRECT: 0.2 MG/DL (ref 0–0.7)
BUN BLDV-MCNC: 16 MG/DL (ref 6–23)
CALCIUM SERPL-MCNC: 8.8 MG/DL (ref 8.3–10.6)
CHLORIDE BLD-SCNC: 106 MMOL/L (ref 99–110)
CO2: 26 MMOL/L (ref 21–32)
CREAT SERPL-MCNC: 0.6 MG/DL (ref 0.9–1.3)
CULTURE: NORMAL
CULTURE: NORMAL
DIFFERENTIAL TYPE: ABNORMAL
DIFFERENTIAL TYPE: ABNORMAL
EOSINOPHILS ABSOLUTE: 0.6 K/CU MM
EOSINOPHILS ABSOLUTE: ABNORMAL K/CU MM
EOSINOPHILS RELATIVE PERCENT: 4 % (ref 0–3)
EOSINOPHILS RELATIVE PERCENT: ABNORMAL % (ref 0–3)
GFR AFRICAN AMERICAN: >60 ML/MIN/1.73M2
GFR NON-AFRICAN AMERICAN: >60 ML/MIN/1.73M2
GLUCOSE BLD-MCNC: 103 MG/DL (ref 70–99)
HCT VFR BLD CALC: 37.9 % (ref 42–52)
HCV QUANTITATIVE: ABNORMAL IU/ML
HEMOGLOBIN: 12.3 GM/DL (ref 13.5–18)
HEP CRNA PCR QNT INTERP: ABNORMAL
HEP CRNA PCR QNT INTERP: DETECTED
HEPATITIS C RNA PCR QUANT: 6.38 LOG IU/ML
HIGH SENSITIVE C-REACTIVE PROTEIN: 4.6 MG/L
LYMPHOCYTES ABSOLUTE: 4.4 K/CU MM
LYMPHOCYTES ABSOLUTE: ABNORMAL K/CU MM
LYMPHOCYTES RELATIVE PERCENT: 31 % (ref 24–44)
LYMPHOCYTES RELATIVE PERCENT: ABNORMAL % (ref 24–44)
Lab: NORMAL
Lab: NORMAL
MAGNESIUM: 2 MG/DL (ref 1.8–2.4)
MCH RBC QN AUTO: 30 PG (ref 27–31)
MCHC RBC AUTO-ENTMCNC: 32.5 % (ref 32–36)
MCV RBC AUTO: 92.4 FL (ref 78–100)
MONOCYTES ABSOLUTE: 0.6 K/CU MM
MONOCYTES ABSOLUTE: ABNORMAL K/CU MM
MONOCYTES RELATIVE PERCENT: 4 % (ref 0–4)
MONOCYTES RELATIVE PERCENT: ABNORMAL % (ref 0–4)
NUCLEATED RBC %: 0 %
PDW BLD-RTO: 12 % (ref 11.7–14.9)
PHOSPHORUS: 3.3 MG/DL (ref 2.5–4.9)
PLATELET # BLD: 369 K/CU MM (ref 140–440)
PMV BLD AUTO: 9.4 FL (ref 7.5–11.1)
POLYCHROMASIA: ABNORMAL
POTASSIUM SERPL-SCNC: 3.5 MMOL/L (ref 3.5–5.1)
PROCALCITONIN: 0.05
RBC # BLD: 4.1 M/CU MM (ref 4.6–6.2)
SEGMENTED NEUTROPHILS ABSOLUTE COUNT: 7.8 K/CU MM
SEGMENTED NEUTROPHILS ABSOLUTE COUNT: ABNORMAL K/CU MM
SEGMENTED NEUTROPHILS RELATIVE PERCENT: 56 % (ref 36–66)
SODIUM BLD-SCNC: 141 MMOL/L (ref 135–145)
SPECIMEN: NORMAL
SPECIMEN: NORMAL
TOTAL NUCLEATED RBC: 0 K/CU MM
TOTAL PROTEIN: 5.7 GM/DL (ref 6.4–8.2)
WBC # BLD: 14.1 K/CU MM (ref 4–10.5)

## 2019-08-27 PROCEDURE — 99232 SBSQ HOSP IP/OBS MODERATE 35: CPT | Performed by: INTERNAL MEDICINE

## 2019-08-27 PROCEDURE — 80053 COMPREHEN METABOLIC PANEL: CPT

## 2019-08-27 PROCEDURE — 97530 THERAPEUTIC ACTIVITIES: CPT

## 2019-08-27 PROCEDURE — 82248 BILIRUBIN DIRECT: CPT

## 2019-08-27 PROCEDURE — 6370000000 HC RX 637 (ALT 250 FOR IP): Performed by: INTERNAL MEDICINE

## 2019-08-27 PROCEDURE — 6360000002 HC RX W HCPCS: Performed by: INTERNAL MEDICINE

## 2019-08-27 PROCEDURE — 94761 N-INVAS EAR/PLS OXIMETRY MLT: CPT

## 2019-08-27 PROCEDURE — 97166 OT EVAL MOD COMPLEX 45 MIN: CPT

## 2019-08-27 PROCEDURE — 84145 PROCALCITONIN (PCT): CPT

## 2019-08-27 PROCEDURE — 94150 VITAL CAPACITY TEST: CPT

## 2019-08-27 PROCEDURE — 2580000003 HC RX 258: Performed by: INTERNAL MEDICINE

## 2019-08-27 PROCEDURE — 85007 BL SMEAR W/DIFF WBC COUNT: CPT

## 2019-08-27 PROCEDURE — 83735 ASSAY OF MAGNESIUM: CPT

## 2019-08-27 PROCEDURE — 85027 COMPLETE CBC AUTOMATED: CPT

## 2019-08-27 PROCEDURE — 86141 C-REACTIVE PROTEIN HS: CPT

## 2019-08-27 PROCEDURE — 84100 ASSAY OF PHOSPHORUS: CPT

## 2019-08-27 PROCEDURE — 1200000000 HC SEMI PRIVATE

## 2019-08-27 RX ORDER — CEFAZOLIN SODIUM 2 G/100ML
2 INJECTION, SOLUTION INTRAVENOUS EVERY 8 HOURS
Status: DISCONTINUED | OUTPATIENT
Start: 2019-08-27 | End: 2019-08-28

## 2019-08-27 RX ADMIN — BUPRENORPHINE HCL 2 MG: 2 TABLET SUBLINGUAL at 13:57

## 2019-08-27 RX ADMIN — ENOXAPARIN SODIUM 40 MG: 100 INJECTION SUBCUTANEOUS at 08:50

## 2019-08-27 RX ADMIN — BUPRENORPHINE HCL 2 MG: 2 TABLET SUBLINGUAL at 09:42

## 2019-08-27 RX ADMIN — CLONIDINE HYDROCHLORIDE 0.1 MG: 0.1 TABLET ORAL at 06:06

## 2019-08-27 RX ADMIN — Medication 10 ML: at 08:56

## 2019-08-27 RX ADMIN — LISINOPRIL 5 MG: 5 TABLET ORAL at 08:50

## 2019-08-27 RX ADMIN — QUETIAPINE FUMARATE 25 MG: 25 TABLET ORAL at 08:50

## 2019-08-27 RX ADMIN — CLONIDINE HYDROCHLORIDE 0.1 MG: 0.1 TABLET ORAL at 23:19

## 2019-08-27 RX ADMIN — QUETIAPINE FUMARATE 25 MG: 25 TABLET ORAL at 21:10

## 2019-08-27 RX ADMIN — POTASSIUM CHLORIDE 10 MEQ: 7.46 INJECTION, SOLUTION INTRAVENOUS at 01:39

## 2019-08-27 RX ADMIN — QUETIAPINE FUMARATE 25 MG: 25 TABLET ORAL at 13:57

## 2019-08-27 RX ADMIN — CLONIDINE HYDROCHLORIDE 0.1 MG: 0.1 TABLET ORAL at 01:39

## 2019-08-27 RX ADMIN — BUPRENORPHINE HCL 2 MG: 2 TABLET SUBLINGUAL at 18:23

## 2019-08-27 RX ADMIN — CLONIDINE HYDROCHLORIDE 0.1 MG: 0.1 TABLET ORAL at 18:23

## 2019-08-27 RX ADMIN — CLONIDINE HYDROCHLORIDE 0.1 MG: 0.1 TABLET ORAL at 13:57

## 2019-08-27 RX ADMIN — CLONIDINE HYDROCHLORIDE 0.1 MG: 0.1 TABLET ORAL at 09:42

## 2019-08-27 RX ADMIN — BUPRENORPHINE HCL 2 MG: 2 TABLET SUBLINGUAL at 01:39

## 2019-08-27 RX ADMIN — CLONIDINE HYDROCHLORIDE 0.1 MG: 0.1 TABLET ORAL at 21:10

## 2019-08-27 RX ADMIN — BUPRENORPHINE HCL 2 MG: 2 TABLET SUBLINGUAL at 21:10

## 2019-08-27 RX ADMIN — Medication 10 ML: at 08:50

## 2019-08-27 RX ADMIN — BUPRENORPHINE HCL 2 MG: 2 TABLET SUBLINGUAL at 06:06

## 2019-08-27 RX ADMIN — BUPRENORPHINE HCL 2 MG: 2 TABLET SUBLINGUAL at 23:19

## 2019-08-27 ASSESSMENT — PAIN DESCRIPTION - LOCATION
LOCATION: HEAD;GENERALIZED
LOCATION: HEAD;GENERALIZED

## 2019-08-27 ASSESSMENT — PAIN DESCRIPTION - ONSET
ONSET: ON-GOING
ONSET: ON-GOING

## 2019-08-27 ASSESSMENT — PAIN SCALES - GENERAL
PAINLEVEL_OUTOF10: 6
PAINLEVEL_OUTOF10: 0
PAINLEVEL_OUTOF10: 4
PAINLEVEL_OUTOF10: 5
PAINLEVEL_OUTOF10: 2
PAINLEVEL_OUTOF10: 4

## 2019-08-27 ASSESSMENT — PAIN DESCRIPTION - FREQUENCY
FREQUENCY: CONTINUOUS
FREQUENCY: CONTINUOUS

## 2019-08-27 ASSESSMENT — PAIN DESCRIPTION - DESCRIPTORS
DESCRIPTORS: ACHING;DISCOMFORT
DESCRIPTORS: DISCOMFORT;ACHING

## 2019-08-27 NOTE — PROGRESS NOTES
Labs     08/25/19  0550 08/26/19  0430 08/26/19  1620 08/27/19  0555    144  --  141   K 3.1* 3.0  K CALLED TO DEONNA DYER RN 2EB [id number] [facility]   RESULTS READ BACK  * 3.0  K CALLED TO VERONIKA ONOFRE RN AT 1701 ON [id number] BY [facility]   RESULTS READ BACK  * 3.5    107  --  106   CO2 21 26  --  26   BUN 21 18  --  16   CREATININE 0.6* 0.6*  --  0.6*   CALCIUM 8.6 8.9  --  8.8   GLUCOSE 128* 126*  --  103*      ABG:  No results for input(s): PH, PO2ART, PPP5ABD, HCO3, BEART, O2SAT in the last 72 hours. No results found for: PROBNP, THEOPH  No results found for: [facility]    Radiology Review:  Pertinent images / reports were reviewed as a part of this visit. Assessment:     Patient Active Problem List   Diagnosis    Chest pain    Pneumomediastinum ([address]    Opiate withdrawal (HCC)    Cellulitis    Acute respiratory failure (HCC)    Polysubstance abuse ([address]       Plan:   1. Overall the patient has improved. 2. Inc. Activity. 3. Will sign off and see if needed. 4. Discussed with the family. 5. A/B as per ID.   Ciara Romero MD  8/27/2019  10:15 AM

## 2019-08-27 NOTE — PROGRESS NOTES
Called report to Tomasa, patient getting transferred to room 4126. Patient girfriend at bedside and updated.

## 2019-08-27 NOTE — PROGRESS NOTES
Infectious Disease Progress Note  2019   Patient Name: Yg Woodward : 1990  Impression  · MSSA pneumonia with acute respiratory failure  ? Extubated   ? Leukocytosis is downtrending  ? S/p bronchoscopy on , bronchial washings positive for moderate staph aureus  · Polysubstance abuse  · HIV non-reactive  · Chronic hepatitis C  ? Hep A non-immune, hepatitis B immune  Plan  · discontinue nafcillin  · Start cefazolin, may eventually transition to linezolid, to complete 2-week course on 2019  ·  T spot pending    Ongoing Antimicrobial Therapy:  Nafcillin   ? Completed Antimicrobial Therapy:  Vancomycin   Cefepime   Metronidazole ? Unasyn   ? History:? Interval history noted  Denies n/v/d/f or untoward effects of antibiotics  Physical Exam:  Vital Signs: /79   Pulse 58   Temp 99.1 °F (37.3 °C) (Oral)   Resp 21   Ht 6' (1.829 m)   Wt 166 lb 0.1 oz (75.3 kg)   SpO2 99%   BMI 22.51 kg/m²     Gen: alert and oriented X3, no distress  Skin: no stigmata of endocarditis  Wounds: C/D/I  HEMT: AT/NC Oropharynx pink, moist, and without lesions or exudates; dentition in good state of repair  Eyes: PERRLA, EOMI, conjunctiva pink, sclera anicteric. Neck: Supple. Trachea midline. No LAD. Chest: no distress and CTA. Good air movement. Heart: RRR and no MRG. Abd: soft, non-distended, no tenderness, no hepatomegaly. Normoactive bowel sounds. Ext: no clubbing, cyanosis, or edema  Catheter Site: without erythema or tenderness  Neuro: Mental status intact.  CN 2-12 intact and no focal sensory or motor deficits     Radiologic / Imaging / TESTING  2019 XR Chest Standard (2 VW):  FINDINGS:   Overlying items external to the patient somewhat limit evaluation.       Right-sided PICC line remains in place.  Removal of enteric tube.       Lungs are clear.  Cardiac and mediastinal silhouettes are within normal   limits.  No pneumothoraces.  Bony structures appear intact.           Labs:    Recent Results (from the past 24 hour(s))   Potassium    Collection Time: 08/26/19  4:20 PM   Result Value Ref Range    Potassium (LL) 3.5 - 5.1 MMOL/L     3.0  K CALLED TO Francisco Santos RN AT 1701 ON [id number] BY  [facility]   RESULTS READ BACK     Magnesium    Collection Time: 08/27/19  5:55 AM   Result Value Ref Range    Magnesium 2.0 1.8 - 2.4 mg/dl   Phosphorus    Collection Time: 08/27/19  5:55 AM   Result Value Ref Range    Phosphorus 3.3 2.5 - 4.9 MG/DL   Basic Metabolic Panel w/ Reflex to MG    Collection Time: 08/27/19  5:55 AM   Result Value Ref Range    Sodium 141 135 - 145 MMOL/L    Potassium 3.5 3.5 - 5.1 MMOL/L    Chloride 106 99 - 110 mMol/L    CO2 26 21 - 32 MMOL/L    Anion Gap 9 4 - 16    BUN 16 6 - 23 MG/DL    CREATININE 0.6 (L) 0.9 - 1.3 MG/DL    Glucose 103 (H) 70 - 99 MG/DL    Calcium 8.8 8.3 - 10.6 MG/DL    GFR Non-African American >60 >60 mL/min/1.73m2    GFR African American >60 >60 mL/min/1.73m2   CBC Auto Differential    Collection Time: 08/27/19  5:55 AM   Result Value Ref Range    WBC 14.1 (H) 4.0 - 10.5 K/CU MM    RBC 4.10 (L) 4.6 - 6.2 M/CU MM    Hemoglobin 12.3 (L) 13.5 - 18.0 GM/DL    Hematocrit 37.9 (L) 42 - 52 %    MCV 92.4 78 - 100 FL    MCH 30.0 27 - 31 PG    MCHC 32.5 32.0 - 36.0 %    RDW 12.0 11.7 - 14.9 %    Platelets 584 718 - 236 K/CU MM    MPV 9.4 7.5 - 11.1 FL    Differential Type AUTOMATED DIFFERENTIAL     Segs Relative 56.0 36 - 66 %    Lymphocytes % 25.4 24 - 44 %    Monocytes % 12.1 (H) 0 - 4 %    Eosinophils % 2.5 0 - 3 %    Basophils % 0.7 0 - 1 %    Segs Absolute 7.9 K/CU MM    Lymphocytes Absolute 3.6 K/CU MM    Monocytes Absolute 1.7 K/CU MM    Eosinophils Absolute 0.4 K/CU MM    Basophils Absolute 0.1 K/CU MM    Nucleated RBC % 0.0 %    Total Nucleated RBC 0.0 K/CU MM    Total Immature Neutrophil 0.47 K/CU MM    Immature Neutrophil % 3.3 (H) 0 - 0.43 %   Procalcitonin    Collection Time: 08/27/19  5:55 AM   Result Value Ref Range

## 2019-08-28 VITALS
WEIGHT: 163.9 LBS | BODY MASS INDEX: 22.2 KG/M2 | DIASTOLIC BLOOD PRESSURE: 66 MMHG | SYSTOLIC BLOOD PRESSURE: 112 MMHG | HEIGHT: 72 IN | RESPIRATION RATE: 19 BRPM | OXYGEN SATURATION: 97 % | TEMPERATURE: 98.2 F | HEART RATE: 70 BPM

## 2019-08-28 PROCEDURE — 6360000002 HC RX W HCPCS: Performed by: INTERNAL MEDICINE

## 2019-08-28 PROCEDURE — 99232 SBSQ HOSP IP/OBS MODERATE 35: CPT | Performed by: INTERNAL MEDICINE

## 2019-08-28 PROCEDURE — 6370000000 HC RX 637 (ALT 250 FOR IP): Performed by: INTERNAL MEDICINE

## 2019-08-28 PROCEDURE — 6370000000 HC RX 637 (ALT 250 FOR IP): Performed by: NURSE PRACTITIONER

## 2019-08-28 RX ORDER — LISINOPRIL 5 MG/1
5 TABLET ORAL DAILY
Qty: 30 TABLET | Refills: 3 | Status: SHIPPED | OUTPATIENT
Start: 2019-08-29

## 2019-08-28 RX ORDER — LINEZOLID 600 MG/1
600 TABLET, FILM COATED ORAL EVERY 12 HOURS SCHEDULED
Qty: 14 TABLET | Refills: 0 | Status: SHIPPED | OUTPATIENT
Start: 2019-08-28 | End: 2019-09-04

## 2019-08-28 RX ORDER — QUETIAPINE FUMARATE 25 MG/1
25 TABLET, FILM COATED ORAL 3 TIMES DAILY
Qty: 60 TABLET | Refills: 3 | Status: SHIPPED | OUTPATIENT
Start: 2019-08-28

## 2019-08-28 RX ORDER — LINEZOLID 600 MG/1
600 TABLET, FILM COATED ORAL EVERY 12 HOURS SCHEDULED
Status: DISCONTINUED | OUTPATIENT
Start: 2019-08-28 | End: 2019-08-28 | Stop reason: HOSPADM

## 2019-08-28 RX ADMIN — CLONIDINE HYDROCHLORIDE 0.1 MG: 0.1 TABLET ORAL at 06:22

## 2019-08-28 RX ADMIN — HYDROXYZINE PAMOATE 50 MG: 25 CAPSULE ORAL at 00:31

## 2019-08-28 RX ADMIN — CLONIDINE HYDROCHLORIDE 0.1 MG: 0.1 TABLET ORAL at 13:53

## 2019-08-28 RX ADMIN — CLONIDINE HYDROCHLORIDE 0.1 MG: 0.1 TABLET ORAL at 10:22

## 2019-08-28 RX ADMIN — QUETIAPINE FUMARATE 25 MG: 25 TABLET ORAL at 10:15

## 2019-08-28 RX ADMIN — LISINOPRIL 5 MG: 5 TABLET ORAL at 10:14

## 2019-08-28 RX ADMIN — BUPRENORPHINE HCL 2 MG: 2 TABLET SUBLINGUAL at 06:22

## 2019-08-28 RX ADMIN — QUETIAPINE FUMARATE 25 MG: 25 TABLET ORAL at 13:53

## 2019-08-28 RX ADMIN — LINEZOLID 600 MG: 600 TABLET, FILM COATED ORAL at 00:31

## 2019-08-28 RX ADMIN — BUPRENORPHINE HCL 2 MG: 2 TABLET SUBLINGUAL at 10:22

## 2019-08-28 RX ADMIN — BUPRENORPHINE HCL 2 MG: 2 TABLET SUBLINGUAL at 13:53

## 2019-08-28 ASSESSMENT — PAIN SCALES - GENERAL
PAINLEVEL_OUTOF10: 5
PAINLEVEL_OUTOF10: 6

## 2019-08-28 NOTE — PROGRESS NOTES
CLINICAL PHARMACY NOTE: MEDS TO [address] Select Patient?: No  Total # of Prescriptions Filled: 3   The following medications were delivered to the patient:  · Quetiapine 25mg  · Linezolid 600mg  · Lisinopril 5mg  Total # of Interventions Completed: 1  Time Spent (min): 30    Additional Documentation:  His insurance was not active at this time. We contacted med assist and they covered all 3 meds today.

## 2019-08-28 NOTE — PROGRESS NOTES
Infectious Disease Progress Note  2019   Patient Name: Steve Yates : 1990   Impression  · MSSA pneumonia with acute respiratory failure  ? Extubated   ? Leukocytosis is downtrending, feels better. ? S/p bronchoscopy on , bronchial washings positive for moderate staph aureus  · Polysubstance abuse  · HIV non-reactive  · Chronic hepatitis C  ? Hep A non-immune, hepatitis B immune  Plan  · D/c cefazolin  · Continue  linezolid 600 mg po bid  to complete 2-week course on 2019  ·  T spot pending     Ongoing Antimicrobial Therapy:  Linezolid   ? Completed Antimicrobial Therapy:  Vancomycin   Cefepime   Metronidazole ? Unasyn   Nafcillin   Cefazolin     History:? Interval history noted. Aspiration pnuemonia  Awake, no complaints, would like to be discharged  Physical Exam:  Vital Signs: /66   Pulse 70   Temp 98.2 °F (36.8 °C) (Oral)   Resp 19   Ht 6' (1.829 m)   Wt 163 lb 14.4 oz (74.3 kg)   SpO2 97%   BMI 22.23 kg/m²     Gen: awake  Skin: no stigmata of endocarditis  Wounds: C/D/I  HEMT: AT/NC   Eyes: Pupils equal.   Neck: Supple. Trachea midline. No LAD. Chest: no distress and CTA. Good air movement. Heart: RRR and no MRG. Abd: soft, non-distended, no tenderness, no hepatomegaly. Normoactive bowel sounds. Ext: no clubbing, cyanosis, or edema  Catheter Site: without erythema or tenderness  Neuro: no focal neurologic deficit     Radiologic / Imaging / TESTING  CXR   Impression:       1. Support lines and tubes remain present. 2. Interval re-expansion of the right lower lobe with some residual patchy  right basilar opacity, probably atelectasis or infiltrate. 3. Left perihilar atelectasis       Labs:    No results found for this or any previous visit (from the past 24 hour(s)).   CULTURE results: Invalid input(s): BLOOD CULTURE,  URINE CULTURE, SURGICAL CULTURE    Diagnosis:  Patient Active Problem List   Diagnosis   

## 2019-08-28 NOTE — CARE COORDINATION
Chart reviewed and received a call from Dr. Luis Mane stating the patient wants inpatient at [facility]. Called [place] and left a VM. CM is following. 1400  Received call from Charissa Perdomo @ [phone] ex 250 that a Tatianna Stanley is coming to the hospital to do an onsite assessment and take the patient to their inpatient at [facility]. Dr and nurse updated. [phone]  The patient got med assist to help with medications and is now refusing to go to [facility] and is going to [facility]. Doctor and nurse are aware. Patient has his pregnant girlfriend and baby at bedside.

## 2019-08-28 NOTE — DISCHARGE SUMMARY
spacer (and mask if indicated). Thanks. linezolid (ZYVOX) 600 MG tablet  Take 1 tablet by mouth every 12 hours for 7 days             lisinopril (PRINIVIL;ZESTRIL) 5 MG tablet  Take 1 tablet by mouth daily             mirtazapine (REMERON) 15 MG tablet  Take 15 mg by mouth nightly             nicotine (NICODERM CQ) 21 MG/24HR  Place 1 patch onto the skin every 24 hours             QUEtiapine (SEROQUEL) 25 MG tablet  Take 1 tablet by mouth 3 times daily             Spacer/Aero-Holding Chambers (E-Z SPACER) PARKER  1 Device by Does not apply route daily as needed (for use with inhaler for wheezing)                 Objective Findings at Discharge:   /66   Pulse 70   Temp 98.2 °F (36.8 °C) (Oral)   Resp 19   Ht 6' (1.829 m)   Wt 163 lb 14.4 oz (74.3 kg)   SpO2 97%   BMI 22.23 kg/m²            GEN    Awake male, sitting upright in bed in no apparent distress. Appears given age. EYES   Pupils are equally round. No scleral erythema, discharge, or conjunctivitis. HENT  Mucous membranes are moist. Oral pharynx without exudates, no evidence of thrush. NECK  Supple, no apparent thyromegaly or masses. RESP  Clear to auscultation, no wheezes, rales or rhonchi. Symmetric chest movement while on occasional crackles, right. No wheezes. CARDIO/VASC           S1/S2 auscultated. Regular rate without appreciable murmurs, rubs, or gallops. No JVD or carotid bruits. Peripheral pulses equal bilaterally and palpable. No peripheral edema. GI        Abdomen is soft without significant tenderness, masses, or guarding. Bowel sounds are normoactive. Rectal exam deferred. MSK    No gross joint deformities. SKIN    Normal coloration, warm, dry. NEURO           Cranial nerves appear grossly intact, normal speech, no lateralizing weakness. PSYCH            Awake, alert, oriented x 4.   Affect appropriate    BMP/CBC  Recent Labs     08/26/19  0430 08/26/19  1620 08/27/19  0555     --  141   K 3.0  K CALLED TO DEONNA DYER RN 2EB [id number] [facility]   RESULTS READ BACK  * 3.0  K CALLED TO VERONIKA ONOFRE RN AT 1701 ON [id number] BY [facility]   RESULTS READ BACK  * 3.5     --  106   CO2 26  --  26   BUN 18  --  16   CREATININE 0.6*  --  0.6*   WBC 17.7*  --  14.1*   HCT 37.6*  --  37.9*     --  369       IMAGING:  Xr Chest Standard (2 Vw)    Result Date: 8/26/2019  EXAMINATION: TWO XRAY VIEWS OF THE CHEST 8/26/2019 12:52 pm COMPARISON: 8/25/2019 HISTORY: ORDERING SYSTEM PROVIDED HISTORY: atelectasis TECHNOLOGIST PROVIDED HISTORY: Reason for exam:->atelectasis Reason for Exam: atelectasis Acuity: Acute Type of Exam: Initial FINDINGS: Overlying items external to the patient somewhat limit evaluation. Right-sided PICC line remains in place. Removal of enteric tube. Lungs are clear. Cardiac and mediastinal silhouettes are within normal limits. No pneumothoraces. Bony structures appear intact. Interval removal of enteric tube. Otherwise stable exam without evidence for acute cardiopulmonary process. Xr Abdomen (kub) (single Ap View)    Result Date: 8/22/2019  EXAMINATION: ONE SUPINE X-RAY VIEW(S) OF THE ABDOMEN, 8/22/2019 5:38 am COMPARISON: 08/14/2019 HISTORY: ORDERING SYSTEM PROVIDED HISTORY: Sedated, paralyzed , fever TECHNOLOGIST PROVIDED HISTORY: Reason for exam:->Sedated, paralyzed , fever Reason for Exam: Sedated, paralyzed , fever Acuity: Acute Type of Exam: Initial FINDINGS: NG tube tip and proximal side-port reside in the gastric fundus. No bowel obstruction is seen. There is likely increasing volume loss in the right lung base not completely assessed on this exam.     NG tube tip and proximal side-port reside in the gastric fundus. Increasing opacity right lung base suggesting right basilar volume loss.      Ct Head Wo Contrast    Result Date: 8/14/2019  EXAMINATION: CT OF THE HEAD WITHOUT CONTRAST  8/14/2019 2:31 pm TECHNIQUE: CT of the head was performed without the administration of vent. Acuity: Acute Type of Exam: Subsequent/Follow-up Initial evaluation. FINDINGS: Monitor wires overlie the chest.  The patient is intubated and has an NG tube unchanged from prior. The left lung is clear. There is interval development of atelectasis or infiltrate in the right lung base with a right pleural effusion. A mucous plug could be considered. This is new since the prior study. A PICC line remains in place. Interval development of a right pleural effusion with atelectasis or infiltrate at the right lung base. A mucous plug could be considered. Follow up to resolution is suggested. Xr Chest Portable    Result Date: 8/25/2019  EXAMINATION: ONE XRAY VIEW OF THE CHEST 8/25/2019 5:14 am COMPARISON: 08/24/2019 HISTORY: Follow-up right lower lobe collapse. FINDINGS: Patient has been extubated. Enteric tube remains in place along with a right PICC. Stable cardiomediastinal contours. The right lower lobe has re-expanded. No significant airspace disease within either lung. No pleural effusion or pneumothorax. Re-expansion of the right lower lobe. Xr Chest Portable    Result Date: 8/24/2019  EXAMINATION: ONE X-RAY VIEW OF THE CHEST, 8/24/2019 5:20 am COMPARISON: 08/23/2019. HISTORY: ORDERING SYSTEM PROVIDED HISTORY: On vent TECHNOLOGIST PROVIDED HISTORY: Reason for exam:-> On vent Reason for Exam: On vent Acuity: Acute Type of Exam: Initial Additional signs and symptoms: NA Relevant Medical/Surgical History: Hypertension, hepatitis C. Follow-up. FINDINGS: The cardiac silhouette and mediastinal contours are stable. Endotracheal tube and orogastric tubes are unchanged. Right PICC line is again noted. There is collapse of the right lower lobe which has increased. There has been shift of the heart towards the right. Atelectasis in the left perihilar region is again noted, more pronounced inferiorly. No other focal lung infiltrate. The visualized osseous structures are unremarkable.      1. Partial collapse of the right lower lobe, increased in severity with shift of the heart towards the right. 2. Left perihilar atelectasis, slightly improved. 3. Support tubes and lines are unchanged. Xr Chest Portable    Result Date: 8/23/2019  EXAMINATION: ONE XRAY VIEW OF THE CHEST 8/23/2019 6:41 am COMPARISON: 08/22/2019 5:25 a.m. HISTORY: ORDERING SYSTEM PROVIDED HISTORY: on vent TECHNOLOGIST PROVIDED HISTORY: Reason for exam:->on vent Reason for Exam: vent, Acuity: Acute Type of Exam: Initial FINDINGS: Monitor wires project over the thorax. Rotated chest.  ETT tip 4.7 cm above the geno. Enteric catheter courses below diaphragm. Right PICC tip at the cavoatrial junction. Interval re-expansion of the right lower lobe with some residual right basilar opacity. Left perihilar opacity present as well. No pneumothorax. 1. Support lines and tubes remain present. 2. Interval re-expansion of the right lower lobe with some residual patchy right basilar opacity, probably atelectasis or infiltrate. 3. Left perihilar atelectasis. Xr Chest Portable    Result Date: 8/21/2019  EXAMINATION: ONE XRAY VIEW OF THE CHEST 8/21/2019 1:18 pm COMPARISON: Chest x-ray from today HISTORY: ORDERING SYSTEM PROVIDED HISTORY: tube placement, was moved TECHNOLOGIST PROVIDED HISTORY: Reason for exam:->tube placement, was moved Reason for Exam: tube placement, was moved Acuity: Acute Type of Exam: Subsequent/Follow-up FINDINGS: Tip of the endotracheal tube is 6.6 cm above the geno. Tip of the right-sided PICC overlies the cavoatrial junction. Tip of the enteric tube overlies the gastric fundus. Improved aeration of the right lower lung when compared to the prior study. No pneumothorax identified. The heart is unchanged in size. Improved aeration of the right lower lung when compared to the prior study.      Xr Chest Portable    Result Date: 8/21/2019  EXAMINATION: ONE XRAY VIEW OF THE CHEST 8/21/2019 10:06 am and cardiac contours. 1. Near complete to complete collapse of the right lower lobe, potentially a result of mucous plugging. Superimposed pneumonia and aspiration are not excluded. 2. Suspected pulmonary vascular congestion with possible perihilar edema. Pneumonia could result in a similar appearance. 3. Trace bilateral pleural effusions. Xr Chest Portable    Result Date: 8/18/2019  EXAMINATION: ONE XRAY VIEW OF THE CHEST 8/18/2019 5:36 am COMPARISON: 08/16/2019 HISTORY: ORDERING SYSTEM PROVIDED HISTORY: vent TECHNOLOGIST PROVIDED HISTORY: Reason for exam:->vent Reason for Exam: vent Acuity: Unknown Additional signs and symptoms: vent Relevant Medical/Surgical History: vent FINDINGS: Endotracheal tube tip is 6.2 cm above the geno. Tip of the enteric tube overlies the gastric fundus. Left pleural effusion with a left retrocardiac opacity again identified, not significantly changed from the prior study. Patchy right basilar opacity is also noted. The cardiomediastinal silhouette is similar in size to the prior study. No pneumothorax is seen. Endotracheal tube tip is 6.2 cm above the geno. No pneumothorax identified. Left pleural effusion with bibasilar airspace opacities which could represent atelectasis and/or infection. Xr Chest Portable    Result Date: 8/17/2019  EXAMINATION: ONE XRAY VIEW OF THE CHEST 8/16/2019 12:05 pm COMPARISON: August 16, 2019 HISTORY: ORDERING SYSTEM PROVIDED HISTORY: repositioning of ETT TECHNOLOGIST PROVIDED HISTORY: Reason for exam:->repositioning of ETT Reason for Exam: repositioning of ETT Acuity: Acute Type of Exam: Initial FINDINGS: Tip of the endotracheal tube terminates 3.7 cm above the geno. The nasogastric tube tip terminates over the fundus. Hazy opacity overlies the left lower lobe suggesting combination of pleural fluid and atelectasis. Small left apical pneumothorax with deep sulcus sign on the left. 1. ETT repositioning.  2. Suspected small

## 2019-08-29 LAB
EKG ATRIAL RATE: 86 BPM
EKG DIAGNOSIS: NORMAL
EKG P AXIS: 77 DEGREES
EKG P-R INTERVAL: 150 MS
EKG Q-T INTERVAL: 372 MS
EKG QRS DURATION: 88 MS
EKG QTC CALCULATION (BAZETT): 445 MS
EKG R AXIS: 82 DEGREES
EKG T AXIS: 71 DEGREES
EKG VENTRICULAR RATE: 86 BPM

## 2019-09-25 LAB
FINAL RESULT: NORMAL
PRELIMINARY: NORMAL

## 2019-10-20 LAB
ACID FAST SMEAR: NORMAL
FINAL RESULT: NORMAL
PRELIMINARY: NORMAL

## 2020-06-18 ENCOUNTER — APPOINTMENT (OUTPATIENT)
Dept: CT IMAGING | Age: 30
End: 2020-06-18
Payer: MEDICAID

## 2020-06-18 ENCOUNTER — HOSPITAL ENCOUNTER (EMERGENCY)
Age: 30
Discharge: HOME | End: 2020-06-18
Attending: EMERGENCY MEDICINE
Payer: MEDICAID

## 2020-06-18 VITALS
HEART RATE: 67 BPM | TEMPERATURE: 98.5 F | BODY MASS INDEX: 23.03 KG/M2 | RESPIRATION RATE: 14 BRPM | OXYGEN SATURATION: 98 % | HEIGHT: 72 IN | DIASTOLIC BLOOD PRESSURE: 82 MMHG | WEIGHT: 170 LBS | SYSTOLIC BLOOD PRESSURE: 133 MMHG

## 2020-06-18 LAB
ALBUMIN SERPL-MCNC: 4.5 GM/DL (ref 3.4–5)
ALP BLD-CCNC: 90 IU/L (ref 40–129)
ALT SERPL-CCNC: 22 U/L (ref 10–40)
ANION GAP SERPL CALCULATED.3IONS-SCNC: 9 MMOL/L (ref 4–16)
AST SERPL-CCNC: 20 IU/L (ref 15–37)
BACTERIA: NEGATIVE /HPF
BASOPHILS ABSOLUTE: 0.1 K/CU MM
BASOPHILS RELATIVE PERCENT: 0.5 % (ref 0–1)
BILIRUB SERPL-MCNC: 0.3 MG/DL (ref 0–1)
BILIRUBIN URINE: NEGATIVE MG/DL
BLOOD, URINE: ABNORMAL
BUN BLDV-MCNC: 17 MG/DL (ref 6–23)
CALCIUM SERPL-MCNC: 9.6 MG/DL (ref 8.3–10.6)
CHLORIDE BLD-SCNC: 103 MMOL/L (ref 99–110)
CLARITY: CLEAR
CO2: 26 MMOL/L (ref 21–32)
COLOR: YELLOW
CREAT SERPL-MCNC: 1 MG/DL (ref 0.9–1.3)
DIFFERENTIAL TYPE: ABNORMAL
EOSINOPHILS ABSOLUTE: 0.2 K/CU MM
EOSINOPHILS RELATIVE PERCENT: 1.7 % (ref 0–3)
GFR AFRICAN AMERICAN: >60 ML/MIN/1.73M2
GFR NON-AFRICAN AMERICAN: >60 ML/MIN/1.73M2
GLUCOSE BLD-MCNC: 103 MG/DL (ref 70–99)
GLUCOSE, URINE: NEGATIVE MG/DL
HCT VFR BLD CALC: 49.7 % (ref 42–52)
HEMOGLOBIN: 16.5 GM/DL (ref 13.5–18)
IMMATURE NEUTROPHIL %: 0.4 % (ref 0–0.43)
KETONES, URINE: NEGATIVE MG/DL
LEUKOCYTE ESTERASE, URINE: NEGATIVE
LIPASE: 18 IU/L (ref 13–60)
LYMPHOCYTES ABSOLUTE: 1.8 K/CU MM
LYMPHOCYTES RELATIVE PERCENT: 14.4 % (ref 24–44)
MCH RBC QN AUTO: 29.8 PG (ref 27–31)
MCHC RBC AUTO-ENTMCNC: 33.2 % (ref 32–36)
MCV RBC AUTO: 89.9 FL (ref 78–100)
MONOCYTES ABSOLUTE: 1.2 K/CU MM
MONOCYTES RELATIVE PERCENT: 9.1 % (ref 0–4)
MUCUS: ABNORMAL HPF
NITRITE URINE, QUANTITATIVE: NEGATIVE
NUCLEATED RBC %: 0 %
PDW BLD-RTO: 12.8 % (ref 11.7–14.9)
PH, URINE: 5 (ref 5–8)
PLATELET # BLD: 205 K/CU MM (ref 140–440)
PMV BLD AUTO: 9.6 FL (ref 7.5–11.1)
POTASSIUM SERPL-SCNC: 4.1 MMOL/L (ref 3.5–5.1)
PROTEIN UA: 30 MG/DL
RBC # BLD: 5.53 M/CU MM (ref 4.6–6.2)
RBC URINE: 224 /HPF (ref 0–3)
SEGMENTED NEUTROPHILS ABSOLUTE COUNT: 9.5 K/CU MM
SEGMENTED NEUTROPHILS RELATIVE PERCENT: 73.9 % (ref 36–66)
SODIUM BLD-SCNC: 138 MMOL/L (ref 135–145)
SPECIFIC GRAVITY UA: 1.03 (ref 1–1.03)
TOTAL IMMATURE NEUTOROPHIL: 0.05 K/CU MM
TOTAL NUCLEATED RBC: 0 K/CU MM
TOTAL PROTEIN: 8.2 GM/DL (ref 6.4–8.2)
TRICHOMONAS: ABNORMAL /HPF
UROBILINOGEN, URINE: NORMAL MG/DL (ref 0.2–1)
WBC # BLD: 12.8 K/CU MM (ref 4–10.5)
WBC UA: 1 /HPF (ref 0–2)
YEAST: ABNORMAL /HPF

## 2020-06-18 PROCEDURE — 6360000004 HC RX CONTRAST MEDICATION: Performed by: EMERGENCY MEDICINE

## 2020-06-18 PROCEDURE — 2580000003 HC RX 258: Performed by: EMERGENCY MEDICINE

## 2020-06-18 PROCEDURE — 81001 URINALYSIS AUTO W/SCOPE: CPT

## 2020-06-18 PROCEDURE — 80053 COMPREHEN METABOLIC PANEL: CPT

## 2020-06-18 PROCEDURE — 74177 CT ABD & PELVIS W/CONTRAST: CPT

## 2020-06-18 PROCEDURE — 87086 URINE CULTURE/COLONY COUNT: CPT

## 2020-06-18 PROCEDURE — 83690 ASSAY OF LIPASE: CPT

## 2020-06-18 PROCEDURE — 96374 THER/PROPH/DIAG INJ IV PUSH: CPT

## 2020-06-18 PROCEDURE — 99284 EMERGENCY DEPT VISIT MOD MDM: CPT

## 2020-06-18 PROCEDURE — 84702 CHORIONIC GONADOTROPIN TEST: CPT

## 2020-06-18 PROCEDURE — 6370000000 HC RX 637 (ALT 250 FOR IP): Performed by: EMERGENCY MEDICINE

## 2020-06-18 PROCEDURE — 85025 COMPLETE CBC W/AUTO DIFF WBC: CPT

## 2020-06-18 PROCEDURE — 96375 TX/PRO/DX INJ NEW DRUG ADDON: CPT

## 2020-06-18 PROCEDURE — 6360000002 HC RX W HCPCS: Performed by: EMERGENCY MEDICINE

## 2020-06-18 RX ORDER — KETOROLAC TROMETHAMINE 30 MG/ML
30 INJECTION, SOLUTION INTRAMUSCULAR; INTRAVENOUS ONCE
Status: COMPLETED | OUTPATIENT
Start: 2020-06-18 | End: 2020-06-18

## 2020-06-18 RX ORDER — OXYCODONE HYDROCHLORIDE AND ACETAMINOPHEN 5; 325 MG/1; MG/1
2 TABLET ORAL ONCE
Status: COMPLETED | OUTPATIENT
Start: 2020-06-18 | End: 2020-06-18

## 2020-06-18 RX ORDER — POLYETHYLENE GLYCOL 3350 17 G/17G
17 POWDER, FOR SOLUTION ORAL DAILY PRN
Qty: 527 G | Refills: 1 | Status: SHIPPED | OUTPATIENT
Start: 2020-06-18 | End: 2020-07-18

## 2020-06-18 RX ORDER — ONDANSETRON 2 MG/ML
4 INJECTION INTRAMUSCULAR; INTRAVENOUS EVERY 30 MIN PRN
Status: DISCONTINUED | OUTPATIENT
Start: 2020-06-18 | End: 2020-06-18 | Stop reason: HOSPADM

## 2020-06-18 RX ORDER — 0.9 % SODIUM CHLORIDE 0.9 %
1000 INTRAVENOUS SOLUTION INTRAVENOUS ONCE
Status: COMPLETED | OUTPATIENT
Start: 2020-06-18 | End: 2020-06-18

## 2020-06-18 RX ORDER — NAPROXEN 500 MG/1
500 TABLET ORAL 2 TIMES DAILY
Qty: 60 TABLET | Refills: 0 | Status: SHIPPED | OUTPATIENT
Start: 2020-06-18 | End: 2022-01-17

## 2020-06-18 RX ORDER — TAMSULOSIN HYDROCHLORIDE 0.4 MG/1
0.4 CAPSULE ORAL DAILY
Qty: 10 CAPSULE | Refills: 3 | Status: SHIPPED | OUTPATIENT
Start: 2020-06-18 | End: 2020-06-28

## 2020-06-18 RX ORDER — MORPHINE SULFATE 4 MG/ML
4 INJECTION, SOLUTION INTRAMUSCULAR; INTRAVENOUS EVERY 30 MIN PRN
Status: DISCONTINUED | OUTPATIENT
Start: 2020-06-18 | End: 2020-06-18 | Stop reason: HOSPADM

## 2020-06-18 RX ORDER — ONDANSETRON 4 MG/1
4 TABLET, ORALLY DISINTEGRATING ORAL EVERY 8 HOURS PRN
Qty: 15 TABLET | Refills: 0 | Status: SHIPPED | OUTPATIENT
Start: 2020-06-18

## 2020-06-18 RX ORDER — OXYCODONE HYDROCHLORIDE AND ACETAMINOPHEN 5; 325 MG/1; MG/1
1 TABLET ORAL EVERY 6 HOURS PRN
Qty: 15 TABLET | Refills: 0 | Status: SHIPPED | OUTPATIENT
Start: 2020-06-18 | End: 2020-06-21

## 2020-06-18 RX ADMIN — SODIUM CHLORIDE 1000 ML: 9 INJECTION, SOLUTION INTRAVENOUS at 12:37

## 2020-06-18 RX ADMIN — ONDANSETRON 4 MG: 2 INJECTION INTRAMUSCULAR; INTRAVENOUS at 12:37

## 2020-06-18 RX ADMIN — KETOROLAC TROMETHAMINE 30 MG: 30 INJECTION, SOLUTION INTRAMUSCULAR at 14:54

## 2020-06-18 RX ADMIN — OXYCODONE HYDROCHLORIDE AND ACETAMINOPHEN 2 TABLET: 5; 325 TABLET ORAL at 14:52

## 2020-06-18 RX ADMIN — MORPHINE SULFATE 4 MG: 4 INJECTION, SOLUTION INTRAMUSCULAR; INTRAVENOUS at 12:37

## 2020-06-18 RX ADMIN — IOPAMIDOL 80 ML: 755 INJECTION, SOLUTION INTRAVENOUS at 13:24

## 2020-06-18 ASSESSMENT — ENCOUNTER SYMPTOMS
VOMITING: 1
ABDOMINAL PAIN: 1
EYES NEGATIVE: 1
RESPIRATORY NEGATIVE: 1
ALLERGIC/IMMUNOLOGIC NEGATIVE: 1
NAUSEA: 1

## 2020-06-18 ASSESSMENT — PAIN SCALES - GENERAL
PAINLEVEL_OUTOF10: 8
PAINLEVEL_OUTOF10: 8
PAINLEVEL_OUTOF10: 5
PAINLEVEL_OUTOF10: 10
PAINLEVEL_OUTOF10: 5

## 2020-06-18 ASSESSMENT — PAIN DESCRIPTION - LOCATION: LOCATION: ABDOMEN

## 2020-06-18 ASSESSMENT — PAIN DESCRIPTION - PAIN TYPE: TYPE: ACUTE PAIN

## 2020-06-18 NOTE — ED PROVIDER NOTES
[address] [facility]      TRIAGE CHIEF COMPLAINT:   Abdominal Pain (RLQ pain) and Emesis      [place]:  Yuan Lopez is a 35 y.o. male that presents with complaint of right lower quadrant abdominal pain flank pain nausea vomiting that started last night. Pain comes and goes sudden onset last night had diaphoresis with it. Denies any fever chest pain shortness of breath travel sick contacts urine complaints he has had some constipation still has his appendix never had a kidney stone pain now constant denies other questions or concerns. No concerns for STDs denies any testicle pain. No other questions or concerns    REVIEW OF SYSTEMS:  At least 10 systems reviewed and otherwise acutely negative except as in the [address] Review of Systems   Constitutional: Positive for diaphoresis. HENT: Negative. Eyes: Negative. Respiratory: Negative. Cardiovascular: Negative. Gastrointestinal: Positive for abdominal pain, nausea and vomiting. Endocrine: Negative. Genitourinary: Positive for flank pain. Skin: Negative. Allergic/Immunologic: Negative. Neurological: Negative. Hematological: Negative. Psychiatric/Behavioral: Negative. All other systems reviewed and are negative. Past Medical History:   Diagnosis Date    Hepatitis C     Hypertension     Tourette's     when really stressed shows up     Past Surgical History:   Procedure Laterality Date    ARM SURGERY Left 08/23/2016    incision & drainage    BRONCHOSCOPY N/A 8/23/2019    BRONCHOSCOPY ALVEOLAR LAVAGE AND CLEAN OUT performed by Meenakshi Lemos MD at [place] [address] reviewed. No pertinent family history.   Social History     Socioeconomic History    Marital status: Single     Spouse name: Not on file    Number of children: Not on file    Years of education: Not on file    Highest education level: Not on file   Occupational History    Not on file   Social Needs    Financial resource intact. No weakness, tremor, atrophy, abnormal muscle tone or seizure activity. Coordination: Coordination normal.   Psychiatric:         Mood and Affect: Mood normal.         Behavior: Behavior normal. Behavior is cooperative. Thought Content:  Thought content normal.         Judgment: Judgment normal.           I have reviewed andinterpreted all of the currently available lab results from this visit (if applicable):    Results for orders placed or performed during the hospital encounter of 06/18/20   CBC Auto Differential   Result Value Ref Range    WBC 12.8 (H) 4.0 - 10.5 K/CU MM    RBC 5.53 4.6 - 6.2 M/CU MM    Hemoglobin 16.5 13.5 - 18.0 GM/DL    Hematocrit 49.7 42 - 52 %    MCV 89.9 78 - 100 FL    MCH 29.8 27 - 31 PG    MCHC 33.2 32.0 - 36.0 %    RDW 12.8 11.7 - 14.9 %    Platelets 963 717 - 739 K/CU MM    MPV 9.6 7.5 - 11.1 FL    Differential Type AUTOMATED DIFFERENTIAL     Segs Relative 73.9 (H) 36 - 66 %    Lymphocytes % 14.4 (L) 24 - 44 %    Monocytes % 9.1 (H) 0 - 4 %    Eosinophils % 1.7 0 - 3 %    Basophils % 0.5 0 - 1 %    Segs Absolute 9.5 K/CU MM    Lymphocytes Absolute 1.8 K/CU MM    Monocytes Absolute 1.2 K/CU MM    Eosinophils Absolute 0.2 K/CU MM    Basophils Absolute 0.1 K/CU MM    Nucleated RBC % 0.0 %    Total Nucleated RBC 0.0 K/CU MM    Total Immature Neutrophil 0.05 K/CU MM    Immature Neutrophil % 0.4 0 - 0.43 %   CMP   Result Value Ref Range    Sodium 138 135 - 145 MMOL/L    Potassium 4.1 3.5 - 5.1 MMOL/L    Chloride 103 99 - 110 mMol/L    CO2 26 21 - 32 MMOL/L    BUN 17 6 - 23 MG/DL    CREATININE 1.0 0.9 - 1.3 MG/DL    Glucose 103 (H) 70 - 99 MG/DL    Calcium 9.6 8.3 - 10.6 MG/DL    Alb 4.5 3.4 - 5.0 GM/DL    Total Protein 8.2 6.4 - 8.2 GM/DL    Total Bilirubin 0.3 0.0 - 1.0 MG/DL    ALT 22 10 - 40 U/L    AST 20 15 - 37 IU/L    Alkaline Phosphatase 90 40 - 129 IU/L    GFR Non-African American >60 >60 mL/min/1.73m2    GFR African American >60 >60 mL/min/1.73m2    Anion Gap 9 4

## 2020-06-19 ENCOUNTER — CARE COORDINATION (OUTPATIENT)
Dept: CARE COORDINATION | Age: 30
End: 2020-06-19

## 2020-06-19 LAB
CULTURE: NORMAL
Lab: NORMAL
SPECIMEN: NORMAL

## 2020-06-22 ENCOUNTER — CARE COORDINATION (OUTPATIENT)
Dept: CARE COORDINATION | Age: 30
End: 2020-06-22

## 2021-02-05 ENCOUNTER — HOSPITAL ENCOUNTER (EMERGENCY)
Age: 31
Discharge: HOME | End: 2021-02-05
Attending: EMERGENCY MEDICINE
Payer: MEDICAID

## 2021-02-05 VITALS
RESPIRATION RATE: 12 BRPM | DIASTOLIC BLOOD PRESSURE: 67 MMHG | WEIGHT: 170 LBS | BODY MASS INDEX: 23.03 KG/M2 | OXYGEN SATURATION: 99 % | HEART RATE: 89 BPM | HEIGHT: 72 IN | TEMPERATURE: 98.6 F | SYSTOLIC BLOOD PRESSURE: 130 MMHG

## 2021-02-05 DIAGNOSIS — F11.220 OPIOID DEPENDENCE WITH UNCOMPLICATED INTOXICATION (HCC): Primary | ICD-10-CM

## 2021-02-05 PROCEDURE — 99283 EMERGENCY DEPT VISIT LOW MDM: CPT

## 2021-02-05 PROCEDURE — 6370000000 HC RX 637 (ALT 250 FOR IP): Performed by: EMERGENCY MEDICINE

## 2021-02-05 RX ORDER — CLONIDINE HYDROCHLORIDE 0.3 MG/1
0.3 TABLET ORAL 2 TIMES DAILY
Qty: 60 TABLET | Refills: 3 | Status: SHIPPED | OUTPATIENT
Start: 2021-02-05

## 2021-02-05 RX ORDER — CLONIDINE HYDROCHLORIDE 0.1 MG/1
0.3 TABLET ORAL ONCE
Status: COMPLETED | OUTPATIENT
Start: 2021-02-05 | End: 2021-02-05

## 2021-02-05 RX ADMIN — CLONIDINE HYDROCHLORIDE 0.3 MG: 0.1 TABLET ORAL at 22:23

## 2021-02-05 ASSESSMENT — ENCOUNTER SYMPTOMS
ABDOMINAL PAIN: 0
BACK PAIN: 0
VOMITING: 0
DIARRHEA: 0
COUGH: 0
WHEEZING: 0
GASTROINTESTINAL NEGATIVE: 1
EYE REDNESS: 0
EYE PAIN: 0
SINUS PAIN: 0
SORE THROAT: 0
EYES NEGATIVE: 1
RESPIRATORY NEGATIVE: 1
SINUS PRESSURE: 0
CHEST TIGHTNESS: 0
EYE DISCHARGE: 0
CONSTIPATION: 0
SHORTNESS OF BREATH: 0
NAUSEA: 0
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
## 2021-02-06 NOTE — ED PROVIDER NOTES
[address] ENCOUNTER      Pt Name: Dwight Rascon  MRN: [id number]  [place] 1990  Date of evaluation: 2/5/2021  Provider: Katherine Rajan DO    CHIEF COMPLAINT       Chief Complaint   Patient presents with    Other     pt brought in by [address] [facility]. office for heroin use, stated he used large amounts and planned on using more         HISTORY OF PRESENT ILLNESS      Dwight Rascon is a 35 y.o. male who presents to the emergency department  for   Chief Complaint   Patient presents with    Other     pt brought in by [address] [facility]. office for heroin use, stated he used large amounts and planned on using more       [de-identified]10-year-old male presents emerge department with chief complaint of heroin use. Patient is requesting inpatient detox. Patient denies other associated symptoms or modifying factors at this time. Patient does report using \"large amount\" of heroin. The history is provided by the patient and medical records. No  was used. Nursing Notes, Triage Notes & Vital Signs were reviewed. REVIEW OF SYSTEMS    (2-9 systems for level 4, 10 or more for level 5)     Review of Systems   Constitutional: Negative. Negative for chills, fatigue and fever. HENT: Negative. Negative for congestion, sinus pressure, sinus pain and sore throat. Eyes: Negative. Negative for pain, discharge, redness and visual disturbance. Respiratory: Negative. Negative for cough, chest tightness, shortness of breath and wheezing. Cardiovascular: Negative. Negative for chest pain and palpitations. Gastrointestinal: Negative. Negative for abdominal pain, constipation, diarrhea, nausea and vomiting. Musculoskeletal: Negative. Negative for arthralgias, back pain, gait problem, myalgias, neck pain and neck stiffness. Skin: Negative. Negative for rash. Neurological: Negative.   Negative for dizziness, speech difficulty, light-headedness, numbness and headaches. Psychiatric/Behavioral: Positive for behavioral problems, self-injury and sleep disturbance. Negative for agitation, confusion and suicidal ideas. The patient is not nervous/anxious. All other systems reviewed and are negative. Except as noted above the remainder of the review of systems was reviewed and negative. PAST MEDICAL HISTORY     Past Medical History:   Diagnosis Date    Hepatitis C     Hypertension     Tourette's     when really stressed shows up       Prior to Admission medications    Medication Sig Start Date End Date Taking? Authorizing Provider   Naloxone HCl (NALOXONE OPIATE OVERDOSE KIT) 1 each by Nasal route once for 1 dose 2/5/21 2/5/21 Yes Savilla Coleman, DO   cloNIDine (CATAPRES) 0.3 MG tablet Take 1 tablet by mouth 2 times daily 2/5/21  Yes Savilla Coleman, DO   ondansetron (ZOFRAN ODT) 4 MG disintegrating tablet Take 1 tablet by mouth every 8 hours as needed for Nausea 6/18/20   Geovanny Hamilton, DO   tamsulosin (FLOMAX) 0.4 MG capsule Take 1 capsule by mouth daily for 10 doses 6/18/20 6/28/20  Geovanny Hamilton, DO   naproxen (NAPROSYN) 500 MG tablet Take 1 tablet by mouth 2 times daily 6/18/20   Geovanny Hamilton, DO   lisinopril (PRINIVIL;ZESTRIL) 5 MG tablet Take 1 tablet by mouth daily 8/29/19   Annia Mcdonald MD   QUEtiapine (SEROQUEL) 25 MG tablet Take 1 tablet by mouth 3 times daily 8/28/19   Annia Mcdonald MD   Spacer/Aero-Holding Chambers (E-Z SPACER) PARKER 1 Device by Does not apply route daily as needed (for use with inhaler for wheezing) 10/20/18   Fabienne Christy MD   nicotine (NICODERM CQ) 21 MG/24HR Place 1 patch onto the skin every 24 hours 10/20/18   Fabienne Christy MD   albuterol sulfate HFA (PROVENTIL HFA) 108 (90 Base) MCG/ACT inhaler Inhale 2 puffs into the lungs every 4 hours as needed for Wheezing or Shortness of Breath With spacer (and mask if indicated). Thanks.  10/20/18 11/19/18  Jamin Conway Robert Logan MD   mirtazapine (REMERON) 15 MG tablet Take 15 mg by mouth nightly    Historical Provider, MD        Patient Active Problem List   Diagnosis    Chest pain    Pneumomediastinum ([address]    Opiate withdrawal ([address]    Cellulitis    Acute respiratory failure ([address]    Polysubstance abuse ([address]         SURGICAL HISTORY       Past Surgical History:   Procedure Laterality Date    ARM SURGERY Left 08/23/2016    incision & drainage    BRONCHOSCOPY N/A 8/23/2019    BRONCHOSCOPY ALVEOLAR LAVAGE AND CLEAN OUT performed by Sachin Caban MD at [address] [place] [address]       Previous Medications    ALBUTEROL SULFATE HFA (PROVENTIL HFA) 108 (90 BASE) MCG/ACT INHALER    Inhale 2 puffs into the lungs every 4 hours as needed for Wheezing or Shortness of Breath With spacer (and mask if indicated). Thanks. LISINOPRIL (PRINIVIL;ZESTRIL) 5 MG TABLET    Take 1 tablet by mouth daily    MIRTAZAPINE (REMERON) 15 MG TABLET    Take 15 mg by mouth nightly    NAPROXEN (NAPROSYN) 500 MG TABLET    Take 1 tablet by mouth 2 times daily    NICOTINE (NICODERM CQ) 21 MG/24HR    Place 1 patch onto the skin every 24 hours    ONDANSETRON (ZOFRAN ODT) 4 MG DISINTEGRATING TABLET    Take 1 tablet by mouth every 8 hours as needed for Nausea    QUETIAPINE (SEROQUEL) 25 MG TABLET    Take 1 tablet by mouth 3 times daily    SPACER/AERO-HOLDING CHAMBERS (E-Z SPACER) PARKER    1 Device by Does not apply route daily as needed (for use with inhaler for wheezing)    TAMSULOSIN (FLOMAX) 0.4 MG CAPSULE    Take 1 capsule by mouth daily for 10 doses       ALLERGIES     Patient has no known allergies. FAMILY HISTORY     History reviewed. No pertinent family history.        SOCIAL HISTORY       Social History     Socioeconomic History    Marital status: Single     Spouse name: None    Number of children: None    Years of education: None    Highest education level: None   Occupational History    None   Social Needs    Financial resource strain: None    Food insecurity     Worry: None     Inability: None    Transportation needs     Medical: None     Non-medical: None   Tobacco Use    Smoking status: Current Every Day Smoker     Packs/day: 1.00     Types: Cigarettes    Smokeless tobacco: Never Used   Substance and Sexual Activity    Alcohol use: No     Comment: rarely    Drug use: Yes     Comment: +cocaine, benzo, opiates    Sexual activity: Yes     Partners: Female   Lifestyle    Physical activity     Days per week: None     Minutes per session: None    Stress: None   Relationships    Social connections     Talks on phone: None     Gets together: None     Attends [employer] service: None     Active member of club or organization: None     Attends meetings of clubs or organizations: None     Relationship status: None    Intimate partner violence     Fear of current or ex partner: None     Emotionally abused: None     Physically abused: None     Forced sexual activity: None   Other Topics Concern    None   Social History Narrative    None       SCREENINGS               PHYSICAL EXAM    (up to 7 for level 4, 8 or more for level 5)     ED Triage Vitals   BP Temp Temp Source Pulse Resp SpO2 Height Weight   02/05/21 2151 02/05/21 2151 02/05/21 2151 02/05/21 2151 02/05/21 2151 02/05/21 2151 02/05/21 2217 02/05/21 2217   130/67 98.6 °F (37 °C) Oral 89 12 99 % 6' (1.829 m) 170 lb (77.1 kg)       Physical Exam  Vitals signs and nursing note reviewed. Constitutional:       General: He is not in acute distress. Appearance: He is well-developed. He is not diaphoretic. HENT:      Head: Normocephalic and atraumatic. Nose: Nose normal.      Mouth/Throat:      Pharynx: No oropharyngeal exudate. Eyes:      General:         Right eye: No discharge. Left eye: No discharge. Conjunctiva/sclera: Conjunctivae normal.      Pupils: Pupils are equal, round, and reactive to light.    Neck:      Musculoskeletal: Normal range of motion. Vascular: No JVD. Trachea: No tracheal deviation. Cardiovascular:      Rate and Rhythm: Normal rate and regular rhythm. Heart sounds: Normal heart sounds. No murmur. No friction rub. No gallop. Pulmonary:      Effort: Pulmonary effort is normal. No respiratory distress. Breath sounds: Normal breath sounds. No stridor. Abdominal:      General: Bowel sounds are normal. There is no distension. Palpations: Abdomen is soft. There is no mass. Tenderness: There is no abdominal tenderness. Musculoskeletal: Normal range of motion. General: No tenderness or deformity. Skin:     General: Skin is warm and dry. Capillary Refill: Capillary refill takes less than 2 seconds. Coloration: Skin is not pale. Findings: No erythema or rash. Neurological:      Mental Status: He is alert and oriented to person, place, and time. Cranial Nerves: No cranial nerve deficit. Motor: No abnormal muscle tone. Coordination: Coordination normal.   Psychiatric:         Mood and Affect: Mood normal.         Behavior: Behavior normal.         Thought Content: Thought content normal.         Judgment: Judgment normal.         DIAGNOSTIC RESULTS     Labs Reviewed - No data to display       EKG: All EKG's are interpreted by the Emergency Department Physician who either signs or Co-signs this chart in the absence of a cardiologist.       EKG Interpretation    Interpreted by emergency department physician    Kathleen1 Stefano Thibodeauxd:     Non-plain film images such as CT, Ultrasound and MRI are read by the radiologist. Plain radiographic images are visualized and preliminarily interpreted by the emergency physician.        Interpretation per the Radiologist below, if available at the time of this note:    No orders to display         ED BEDSIDE ULTRASOUND:   Performed by ED Physician Asher Thao DO       LABS:  Labs Reviewed - No data to display    All other labs were within normal range or not returned as of this dictation. EMERGENCY DEPARTMENT COURSE and DIFFERENTIAL DIAGNOSIS/MDM:   Vitals:    Vitals:    02/05/21 2151 02/05/21 2217   BP: 130/67    Pulse: 89    Resp: 12    Temp: 98.6 °F (37 °C)    TempSrc: Oral    SpO2: 99%    Weight:  170 lb (77.1 kg)   Height:  6' (1.829 m)           MDM  Number of Diagnoses or Management Options  Opioid dependence with uncomplicated intoxication [facility])  Diagnosis management comments: 67-year-old male presents emergency department under police custody. Patient reports heroin use and reports that he will use more heroin if discharged. Patient denies suicidality. Patient is requesting inpatient rehab. Explained to the patient that, unfortunately, this is not possible tonight. Patient was given clonidine in the emergency department. Will discharge patient home with Narcan overdose kit and clonidine. Also placed consultation to case management to arrange outpatient rehab. Patient is not manic, not hallucinating and not suicidal or homicidal.  At this time, I have no justification for admission. We will discontinue the involuntary mental health hold that was placed by police. Vital signs are normal and stable. We will discharged home    Risk of Complications, Morbidity, and/or Mortality  Presenting problems: low  Diagnostic procedures: low  Management options: low    Critical Care  Total time providing critical care: < 30 minutes    Patient Progress  Patient progress: improved          -  Patient seen and evaluated in the emergency department. -  Triage and nursing notes reviewed and incorporated. -  Old chart records reviewed and incorporated. -  Work-up included:  See above  -  Results discussed with patient. REASSESSMENT          CRITICAL CARE TIME     This excludes seperately billable procedures and family discussion time.  Critical care time provided for obtaining history, conducting a physical exam, performing and monitoring interventions, ordering, collecting and interpreting tests, and establishing medical decision-making. There was a potential for life/limb threatening pathology requiring close evaluation and intervention with concern for patient decompensation. CONSULTS:  IP CONSULT TO CASE MANAGEMENT    PROCEDURES:  None performed unless otherwise noted below     Procedures        FINAL IMPRESSION      1. Opioid dependence with uncomplicated intoxication [facility])          DISPOSITION/PLAN   DISPOSITION Decision To Discharge 02/05/2021 10:18:40 PM      PATIENT REFERRED TO:  No follow-up provider specified. DISCHARGE MEDICATIONS:  New Prescriptions    CLONIDINE (CATAPRES) 0.3 MG TABLET    Take 1 tablet by mouth 2 times daily    NALOXONE HCL (NALOXONE OPIATE OVERDOSE KIT)    1 each by Nasal route once for 1 dose       ED Provider Disposition Time  DISPOSITION Decision To Discharge 02/05/2021 10:18:40 PM      Appropriate personal protective equipment was worn during the patient's evaluation. These included surgical, eye protection, surgical mask or in 95 respirator and gloves. The patient was also placed in a surgical mask for the prevention of possible spread of respiratory viral illnesses. The Patient was instructed to read the package inserts with any medication that was prescribed. Major potential reactions and medication interactions were discussed. The Patient understands that there are numerous possible adverse reactions not covered. The patient was also instructed to arrange follow-up with his or her primary care provider for review of any pending labwork or incidental findings on any radiology results that were obtained. All efforts were made to discuss any incidental findings that require further monitoring. Controlled Substances Monitoring:     No flowsheet data found.     (Please note that portions of this note were completed with a voice recognition program.  Efforts were made to edit the dictations but occasionally words are mis-transcribed.)    Tawny Rocha DO (electronically signed)  Attending Emergency Physician            Tawny Rocha DO  02/05/21 3294

## 2021-02-06 NOTE — ED NOTES
Discharge instructions reviewed. Pt verbalized understanding.        Kristen Enrique, BYRON  02/05/21 1212

## 2021-02-12 ENCOUNTER — HOSPITAL ENCOUNTER (INPATIENT)
Age: 31
LOS: 1 days | Discharge: LEFT BEFORE DISCHARGE | DRG: 770 | End: 2021-02-13
Attending: HOSPITALIST | Admitting: INTERNAL MEDICINE
Payer: MEDICAID

## 2021-02-12 ENCOUNTER — HOSPITAL ENCOUNTER (EMERGENCY)
Age: 31
Discharge: HOME | DRG: 770 | End: 2021-02-12
Attending: EMERGENCY MEDICINE
Payer: MEDICAID

## 2021-02-12 VITALS
DIASTOLIC BLOOD PRESSURE: 78 MMHG | TEMPERATURE: 97.5 F | SYSTOLIC BLOOD PRESSURE: 119 MMHG | WEIGHT: 165 LBS | HEIGHT: 72 IN | HEART RATE: 60 BPM | OXYGEN SATURATION: 97 % | BODY MASS INDEX: 22.35 KG/M2 | RESPIRATION RATE: 16 BRPM

## 2021-02-12 DIAGNOSIS — R19.7 NAUSEA VOMITING AND DIARRHEA: ICD-10-CM

## 2021-02-12 DIAGNOSIS — R11.2 NAUSEA VOMITING AND DIARRHEA: ICD-10-CM

## 2021-02-12 DIAGNOSIS — F11.93 HEROIN WITHDRAWAL (HCC): Primary | ICD-10-CM

## 2021-02-12 PROBLEM — F11.13 OPIOID ABUSE WITH WITHDRAWAL (HCC): Status: ACTIVE | Noted: 2021-02-12

## 2021-02-12 PROBLEM — G93.40 ACUTE ENCEPHALOPATHY: Status: ACTIVE | Noted: 2021-02-12

## 2021-02-12 LAB
ALBUMIN SERPL-MCNC: 3.7 GM/DL (ref 3.4–5)
ALP BLD-CCNC: 88 IU/L (ref 40–129)
ALT SERPL-CCNC: 19 U/L (ref 10–40)
ANION GAP SERPL CALCULATED.3IONS-SCNC: 12 MMOL/L (ref 4–16)
AST SERPL-CCNC: 22 IU/L (ref 15–37)
BILIRUB SERPL-MCNC: 0.4 MG/DL (ref 0–1)
BUN BLDV-MCNC: 13 MG/DL (ref 6–23)
CALCIUM SERPL-MCNC: 9.3 MG/DL (ref 8.3–10.6)
CHLORIDE BLD-SCNC: 102 MMOL/L (ref 99–110)
CO2: 19 MMOL/L (ref 21–32)
CREAT SERPL-MCNC: 0.7 MG/DL (ref 0.9–1.3)
GFR AFRICAN AMERICAN: >60 ML/MIN/1.73M2
GFR NON-AFRICAN AMERICAN: >60 ML/MIN/1.73M2
GLUCOSE BLD-MCNC: 110 MG/DL (ref 70–99)
POTASSIUM SERPL-SCNC: 3.9 MMOL/L (ref 3.5–5.1)
SODIUM BLD-SCNC: 133 MMOL/L (ref 135–145)
TOTAL PROTEIN: 7.7 GM/DL (ref 6.4–8.2)

## 2021-02-12 PROCEDURE — 1200000000 HC SEMI PRIVATE

## 2021-02-12 PROCEDURE — 6370000000 HC RX 637 (ALT 250 FOR IP): Performed by: EMERGENCY MEDICINE

## 2021-02-12 PROCEDURE — 94761 N-INVAS EAR/PLS OXIMETRY MLT: CPT

## 2021-02-12 PROCEDURE — 6370000000 HC RX 637 (ALT 250 FOR IP): Performed by: NURSE PRACTITIONER

## 2021-02-12 PROCEDURE — 36415 COLL VENOUS BLD VENIPUNCTURE: CPT

## 2021-02-12 PROCEDURE — G0379 DIRECT REFER HOSPITAL OBSERV: HCPCS

## 2021-02-12 PROCEDURE — 6370000000 HC RX 637 (ALT 250 FOR IP): Performed by: INTERNAL MEDICINE

## 2021-02-12 PROCEDURE — 96372 THER/PROPH/DIAG INJ SC/IM: CPT

## 2021-02-12 PROCEDURE — 80053 COMPREHEN METABOLIC PANEL: CPT

## 2021-02-12 PROCEDURE — G0378 HOSPITAL OBSERVATION PER HR: HCPCS

## 2021-02-12 PROCEDURE — 6360000002 HC RX W HCPCS: Performed by: INTERNAL MEDICINE

## 2021-02-12 RX ORDER — DIPHENHYDRAMINE HCL 25 MG
25 TABLET ORAL NIGHTLY PRN
Status: DISCONTINUED | OUTPATIENT
Start: 2021-02-12 | End: 2021-02-13 | Stop reason: HOSPADM

## 2021-02-12 RX ORDER — LOPERAMIDE HYDROCHLORIDE 2 MG/1
4 CAPSULE ORAL ONCE
Status: COMPLETED | OUTPATIENT
Start: 2021-02-12 | End: 2021-02-12

## 2021-02-12 RX ORDER — LANOLIN ALCOHOL/MO/W.PET/CERES
3 CREAM (GRAM) TOPICAL NIGHTLY
Status: DISCONTINUED | OUTPATIENT
Start: 2021-02-12 | End: 2021-02-13 | Stop reason: HOSPADM

## 2021-02-12 RX ORDER — SODIUM CHLORIDE 0.9 % (FLUSH) 0.9 %
10 SYRINGE (ML) INJECTION PRN
Status: DISCONTINUED | OUTPATIENT
Start: 2021-02-12 | End: 2021-02-13 | Stop reason: HOSPADM

## 2021-02-12 RX ORDER — ACETAMINOPHEN 325 MG/1
650 TABLET ORAL EVERY 6 HOURS PRN
Status: DISCONTINUED | OUTPATIENT
Start: 2021-02-12 | End: 2021-02-13 | Stop reason: HOSPADM

## 2021-02-12 RX ORDER — ONDANSETRON 2 MG/ML
4 INJECTION INTRAMUSCULAR; INTRAVENOUS EVERY 6 HOURS PRN
Status: DISCONTINUED | OUTPATIENT
Start: 2021-02-12 | End: 2021-02-13 | Stop reason: HOSPADM

## 2021-02-12 RX ORDER — SODIUM CHLORIDE 9 MG/ML
INJECTION, SOLUTION INTRAVENOUS CONTINUOUS
Status: DISCONTINUED | OUTPATIENT
Start: 2021-02-12 | End: 2021-02-12

## 2021-02-12 RX ORDER — ONDANSETRON 4 MG/1
4 TABLET, ORALLY DISINTEGRATING ORAL ONCE
Status: COMPLETED | OUTPATIENT
Start: 2021-02-12 | End: 2021-02-12

## 2021-02-12 RX ORDER — DICYCLOMINE HCL 20 MG
20 TABLET ORAL ONCE
Status: COMPLETED | OUTPATIENT
Start: 2021-02-12 | End: 2021-02-12

## 2021-02-12 RX ORDER — ONDANSETRON 4 MG/1
4 TABLET, ORALLY DISINTEGRATING ORAL EVERY 8 HOURS PRN
Qty: 15 TABLET | Refills: 0 | Status: SHIPPED | OUTPATIENT
Start: 2021-02-12

## 2021-02-12 RX ORDER — CLONIDINE HYDROCHLORIDE 0.1 MG/1
0.1 TABLET ORAL PRN
Status: DISCONTINUED | OUTPATIENT
Start: 2021-02-12 | End: 2021-02-13 | Stop reason: HOSPADM

## 2021-02-12 RX ORDER — IBUPROFEN 800 MG/1
800 TABLET ORAL EVERY 8 HOURS PRN
Status: DISCONTINUED | OUTPATIENT
Start: 2021-02-12 | End: 2021-02-13 | Stop reason: HOSPADM

## 2021-02-12 RX ORDER — QUETIAPINE FUMARATE 25 MG/1
25 TABLET, FILM COATED ORAL 3 TIMES DAILY
Status: DISCONTINUED | OUTPATIENT
Start: 2021-02-12 | End: 2021-02-13 | Stop reason: HOSPADM

## 2021-02-12 RX ORDER — BUPRENORPHINE 2 MG/1
2 TABLET SUBLINGUAL PRN
Status: DISCONTINUED | OUTPATIENT
Start: 2021-02-12 | End: 2021-02-13 | Stop reason: HOSPADM

## 2021-02-12 RX ORDER — POLYETHYLENE GLYCOL 3350 17 G/17G
17 POWDER, FOR SOLUTION ORAL DAILY PRN
Status: DISCONTINUED | OUTPATIENT
Start: 2021-02-12 | End: 2021-02-13 | Stop reason: HOSPADM

## 2021-02-12 RX ORDER — SODIUM CHLORIDE 0.9 % (FLUSH) 0.9 %
10 SYRINGE (ML) INJECTION EVERY 12 HOURS SCHEDULED
Status: DISCONTINUED | OUTPATIENT
Start: 2021-02-12 | End: 2021-02-13 | Stop reason: HOSPADM

## 2021-02-12 RX ORDER — GABAPENTIN 300 MG/1
300 CAPSULE ORAL EVERY 8 HOURS PRN
Status: DISCONTINUED | OUTPATIENT
Start: 2021-02-12 | End: 2021-02-13 | Stop reason: HOSPADM

## 2021-02-12 RX ORDER — LISINOPRIL 5 MG/1
5 TABLET ORAL DAILY
Status: DISCONTINUED | OUTPATIENT
Start: 2021-02-12 | End: 2021-02-13 | Stop reason: HOSPADM

## 2021-02-12 RX ORDER — PANTOPRAZOLE SODIUM 40 MG/1
40 TABLET, DELAYED RELEASE ORAL
Status: DISCONTINUED | OUTPATIENT
Start: 2021-02-13 | End: 2021-02-13 | Stop reason: HOSPADM

## 2021-02-12 RX ORDER — HYDROXYZINE PAMOATE 25 MG/1
50 CAPSULE ORAL EVERY 8 HOURS PRN
Status: DISCONTINUED | OUTPATIENT
Start: 2021-02-12 | End: 2021-02-13 | Stop reason: HOSPADM

## 2021-02-12 RX ORDER — DICYCLOMINE HCL 20 MG
20 TABLET ORAL EVERY 6 HOURS PRN
Status: DISCONTINUED | OUTPATIENT
Start: 2021-02-12 | End: 2021-02-13 | Stop reason: HOSPADM

## 2021-02-12 RX ORDER — MIRTAZAPINE 15 MG/1
15 TABLET, FILM COATED ORAL NIGHTLY
Status: DISCONTINUED | OUTPATIENT
Start: 2021-02-12 | End: 2021-02-13 | Stop reason: HOSPADM

## 2021-02-12 RX ORDER — NICOTINE 21 MG/24HR
1 PATCH, TRANSDERMAL 24 HOURS TRANSDERMAL EVERY 24 HOURS
Status: DISCONTINUED | OUTPATIENT
Start: 2021-02-12 | End: 2021-02-13 | Stop reason: HOSPADM

## 2021-02-12 RX ORDER — ACETAMINOPHEN 650 MG/1
650 SUPPOSITORY RECTAL EVERY 6 HOURS PRN
Status: DISCONTINUED | OUTPATIENT
Start: 2021-02-12 | End: 2021-02-13 | Stop reason: HOSPADM

## 2021-02-12 RX ORDER — LOPERAMIDE HYDROCHLORIDE 2 MG/1
2 CAPSULE ORAL 4 TIMES DAILY PRN
Qty: 20 CAPSULE | Refills: 0 | Status: SHIPPED | OUTPATIENT
Start: 2021-02-12 | End: 2021-02-22

## 2021-02-12 RX ORDER — LOPERAMIDE HYDROCHLORIDE 2 MG/1
2 CAPSULE ORAL 4 TIMES DAILY PRN
Status: DISCONTINUED | OUTPATIENT
Start: 2021-02-12 | End: 2021-02-13 | Stop reason: HOSPADM

## 2021-02-12 RX ORDER — PROMETHAZINE HYDROCHLORIDE 25 MG/1
12.5 TABLET ORAL EVERY 6 HOURS PRN
Status: DISCONTINUED | OUTPATIENT
Start: 2021-02-12 | End: 2021-02-13 | Stop reason: HOSPADM

## 2021-02-12 RX ADMIN — DICYCLOMINE HYDROCHLORIDE 20 MG: 20 TABLET ORAL at 17:34

## 2021-02-12 RX ADMIN — ONDANSETRON 4 MG: 4 TABLET, ORALLY DISINTEGRATING ORAL at 14:58

## 2021-02-12 RX ADMIN — QUETIAPINE FUMARATE 25 MG: 25 TABLET ORAL at 20:25

## 2021-02-12 RX ADMIN — MIRTAZAPINE 15 MG: 15 TABLET, FILM COATED ORAL at 20:25

## 2021-02-12 RX ADMIN — ONDANSETRON 4 MG: 4 TABLET, ORALLY DISINTEGRATING ORAL at 18:01

## 2021-02-12 RX ADMIN — LISINOPRIL 5 MG: 5 TABLET ORAL at 20:28

## 2021-02-12 RX ADMIN — Medication 3 MG: at 20:25

## 2021-02-12 RX ADMIN — ENOXAPARIN SODIUM 40 MG: 40 INJECTION SUBCUTANEOUS at 20:25

## 2021-02-12 RX ADMIN — CLONIDINE HYDROCHLORIDE 0.3 MG: 0.2 TABLET ORAL at 20:25

## 2021-02-12 RX ADMIN — LOPERAMIDE HYDROCHLORIDE 4 MG: 2 CAPSULE ORAL at 14:58

## 2021-02-12 RX ADMIN — CLONIDINE HYDROCHLORIDE 0.1 MG: 0.1 TABLET ORAL at 19:15

## 2021-02-12 RX ADMIN — BUPRENORPHINE HCL 2 MG: 2 TABLET SUBLINGUAL at 19:14

## 2021-02-12 ASSESSMENT — PAIN SCALES - GENERAL
PAINLEVEL_OUTOF10: 0
PAINLEVEL_OUTOF10: 0

## 2021-02-12 NOTE — CARE COORDINATION
CM received consult from Dr Guanako Lakhani for patient in room #28 to assist with discharge planning. CM met with patient to begin discharge planning. CM introduced self and CM role. Patient states he presents to ER with c/o withdrawal. Patient states he was inpatient with [facility]. Patient reports withdrawal from heroin. Patient states he last used approximately 0600 yesterday. Patient reports being incontinent of stool at facility today. Patient states he was told that he would be unable to return to [facility] due to incontinence. Patient reports he was given Suboxone today. CM advised patient that ER physician was going to provide patient with medications for symptom relief and patient would likely be able to be discharged back to [facility]. Patient states \"I can't. They told me I couldn't because I shit myself\". CM discussed possible alternate placement with patient and patient declined at this time. CM advised patient that Jenaro Lopez advised he would return to the ER to speak with patient. 1630 CM received telephone call from Jenaro Lopez at this time. States he will be in to speak with patient. States patient will be unable to return to [facility] while he is \"withdrawling\". Jeff Yanez arrived to ER at this time to see patient. Jenaro Lopez contacted nursing supervisor to have patient placed in detox bed. CM will contact nursing supervisor to determine room assignment. [facility] placed telephone call to nursing supervisor to determine room assignment. States she will return CM call. 1750 CM received return call from nursing supervisor with room assignment. Patient will be transferred to room 1104.     1755 CM updated primary nurse Isadora RN of patient room assignment. Patient transported to room 1104 via wheelchair. CM please contact Jenaro Lopez upon patient discharge.

## 2021-02-12 NOTE — ED PROVIDER NOTES
Emergency Department Encounter    Patient: Cindi Lopez  MRN: [id number]  : 1990  Date of Evaluation: 2021  ED Provider:  Skinny Brooks    Triage Chief Complaint:   Withdrawal (requesting detox from opiates, last used heroin at 1800 yesterday. seen at [facility] today and sent here)    [place]:  Cindi Lopez is a 35 y.o. male that presents with complaint of nausea, vomiting, diarrhea. Uses heroin typically every ten hours, last use was 6pm last night, went to [facility] today and got subutext at 8am. Having worsened withdrawal symptoms. Nonbloody diarrhea. He is having severe body aches and nausea vomiting. No syncope. No chest pain. No fevers. Body aches are all over. Denies other drug use. Denies alcohol use. ROS - see HPI, below listed is current ROS at time of my eval:  10 systems reviewed and negative except as above. Past Medical History:   Diagnosis Date    Hepatitis C     Hypertension     Tourette's     when really stressed shows up     Past Surgical History:   Procedure Laterality Date    ARM SURGERY Left 2016    incision & drainage    BRONCHOSCOPY N/A 2019    BRONCHOSCOPY ALVEOLAR LAVAGE AND CLEAN OUT performed by Zac Sewell MD at [place] [id number] reviewed. No pertinent family history. Social History     Socioeconomic History    Marital status: Single     Spouse name: Not on file    Number of children: Not on file    Years of education: Not on file    Highest education level: Not on file   Occupational History    Not on file   Social Needs    Financial resource strain: Not on file    Food insecurity     Worry: Not on file     Inability: Not on file    Transportation needs     Medical: Not on file     Non-medical: Not on file   Tobacco Use    Smoking status: Current Every Day Smoker     Packs/day: 1.00     Types: Cigarettes    Smokeless tobacco: Never Used   Substance and Sexual Activity    Alcohol use:  No Comment: rarely    Drug use: Yes     Comment: +cocaine, benzo, opiates    Sexual activity: Yes     Partners: Female   Lifestyle    Physical activity     Days per week: Not on file     Minutes per session: Not on file    Stress: Not on file   Relationships    Social connections     Talks on phone: Not on file     Gets together: Not on file     Attends [employer] service: Not on file     Active member of club or organization: Not on file     Attends meetings of clubs or organizations: Not on file     Relationship status: Not on file    Intimate partner violence     Fear of current or ex partner: Not on file     Emotionally abused: Not on file     Physically abused: Not on file     Forced sexual activity: Not on file   Other Topics Concern    Not on file   Social History Narrative    Not on file     Current Facility-Administered Medications   Medication Dose Route Frequency Provider Last Rate Last Admin    ondansetron (ZOFRAN-ODT) disintegrating tablet 4 mg  4 mg Oral Once Anatoliy Mcgregor MD         Current Outpatient Medications   Medication Sig Dispense Refill    ondansetron (ZOFRAN ODT) 4 MG disintegrating tablet Take 1 tablet by mouth every 8 hours as needed for Nausea 15 tablet 0    loperamide (RA ANTI-DIARRHEAL) 2 MG capsule Take 1 capsule by mouth 4 times daily as needed for Diarrhea 20 capsule 0    cloNIDine (CATAPRES) 0.3 MG tablet Take 1 tablet by mouth 2 times daily 60 tablet 3    ondansetron (ZOFRAN ODT) 4 MG disintegrating tablet Take 1 tablet by mouth every 8 hours as needed for Nausea 15 tablet 0    tamsulosin (FLOMAX) 0.4 MG capsule Take 1 capsule by mouth daily for 10 doses 10 capsule 3    naproxen (NAPROSYN) 500 MG tablet Take 1 tablet by mouth 2 times daily 60 tablet 0    lisinopril (PRINIVIL;ZESTRIL) 5 MG tablet Take 1 tablet by mouth daily 30 tablet 3    QUEtiapine (SEROQUEL) 25 MG tablet Take 1 tablet by mouth 3 times daily 60 tablet 3    Spacer/Aero-Holding Chambers (E-Z SPACER) PARKER 1 Device by Does not apply route daily as needed (for use with inhaler for wheezing) 1 Device 0    nicotine (NICODERM CQ) 21 MG/24HR Place 1 patch onto the skin every 24 hours 30 patch 3    albuterol sulfate HFA (PROVENTIL HFA) 108 (90 Base) MCG/ACT inhaler Inhale 2 puffs into the lungs every 4 hours as needed for Wheezing or Shortness of Breath With spacer (and mask if indicated). Thanks. 1 Inhaler 1    mirtazapine (REMERON) 15 MG tablet Take 15 mg by mouth nightly       No Known Allergies    Nursing Notes Reviewed    Physical Exam:  Triage VS:    ED Triage Vitals [02/12/21 1349]   Enc Vitals Group      BP (!) 133/94      Pulse 75      Resp 18      Temp 97.5 °F (36.4 °C)      Temp src       SpO2 100 %      Weight 165 lb (74.8 kg)      Height 6' (1.829 m)      Head Circumference       Peak Flow       Pain Score       Pain Loc       Pain Edu? Excl. in [address]? My pulse ox interpretation is - normal    General appearance:  No acute distress. Skin:  Warm. Dry. Eye:  Extraocular movements intact. Ears, nose, mouth and throat:  Oral mucosa moist   Neck:  Trachea midline. Extremity:  No swelling. Normal ROM     Heart:  Regular rate and rhythm, normal S1 & S2, no extra heart sounds. Perfusion:  intact  Respiratory:  Lungs clear to auscultation bilaterally. Respirations nonlabored. Abdominal:   Soft. Nontender. Non distended.   Neurological:  Alert and oriented          Psychiatric:  Appropriate    I have reviewed and interpreted all of the currently available lab results from this visit (if applicable):  Results for orders placed or performed during the hospital encounter of 02/12/21   Comprehensive Metabolic Panel   Result Value Ref Range    Sodium 133 (L) 135 - 145 MMOL/L    Potassium 3.9 3.5 - 5.1 MMOL/L    Chloride 102 99 - 110 mMol/L    CO2 19 (L) 21 - 32 MMOL/L    BUN 13 6 - 23 MG/DL    CREATININE 0.7 (L) 0.9 - 1.3 MG/DL    Glucose 110 (H) 70 - 99 MG/DL    Calcium 9.3 8.3 - 10.6 MG/DL    Albumin 3.7 3.4 - 5.0 GM/DL    Total Protein 7.7 6.4 - 8.2 GM/DL    Total Bilirubin 0.4 0.0 - 1.0 MG/DL    ALT 19 10 - 40 U/L    AST 22 15 - 37 IU/L    Alkaline Phosphatase 88 40 - 129 IU/L    GFR Non-African American >60 >60 mL/min/1.73m2    GFR African American >60 >60 mL/min/1.73m2    Anion Gap 12 4 - 16      Radiographs (if obtained):  Radiologist's Report Reviewed:  No results found. EKG (if obtained): (All EKG's are interpreted by myself in the absence of a cardiologist)      MDM:  71-year-old male with history as above presents with opiate withdrawal.  Does sound like the Subutex dose he received this morning actually precipitated withdrawal for him, medications have been ordered, CMP ordered, case management. We will reassess. Labs are unremarkable. For over 3 hours he had no vomiting no diarrhea here, Nancy Del Cid from [facility] is coming to talk with patient, the  is involved. Patient's COWS score here is now 3. Nancy Del Cid from [facility] came to talk with patient. They had a COWS 17 apparently at the facility, their nursing staff is not comfortable taking him back there as they do not have 24-hour medical observation and they would like to admit him to one of their 5 detox beds here at the hospital.  We will discharge him from the emergency department, they typically admit through their social work team, Nancy Del Cid, and he will give handoff to the team upstairs, they do not accept admissions from the emergency department to that unit. This was discussed with the nurse supervisor Fanny Guerrero. Patient will be given another dose of Zofran here prior to going upstairs. Clinical Impression:  1. Heroin withdrawal ([address]    2. Nausea vomiting and diarrhea      Disposition referral (if applicable):  No follow-up provider specified.   Disposition medications (if applicable):  New Prescriptions    LOPERAMIDE (RA ANTI-DIARRHEAL) 2 MG CAPSULE    Take 1 capsule by mouth 4 times daily as

## 2021-02-12 NOTE — ED PROVIDER NOTES
As physician-in-triage, I performed a medical screening history and physical exam on this patient. HISTORY OF PRESENT ILLNESS  Huang Cordero is a 35 y.o. male presents to the emergency department in acute opiate withdrawal.  He last shot heroin last night. He went to a detox center and was told to come to the emergency department for medical treatment. The patient states he has been having diarrhea and is having no control of his bowels. He is also vomiting and is complaining of severe body aches. He presents to the emergency department requesting medical treatment so he can enter detox. Taya Enciso PHYSICAL EXAM  BP (!) 133/94   Pulse 75   Temp 97.5 °F (36.4 °C)   Resp 18   Ht 6' (1.829 m)   Wt 165 lb (74.8 kg)   SpO2 100%   BMI 22.38 kg/m²     On exam, the patient appears in no acute distress. Speech is clear. Breathing is unlabored. Moves all extremities. He is seated in a wheelchair and appears very pale. He had no vomiting during the interview. He is moving 4 extremities without difficulty. Labs will be ordered and the patient will be ordered on Zofran and Imodium from the waiting area. This assessment was made via teletriage. Comment: Please note this report has been produced using speech recognition software and may contain errors related to that system including errors in grammar, punctuation, and spelling, as well as words and phrases that may be inappropriate. If there are any questions or concerns please feel free to contact the dictating provider for clarification.      Zulema Bravo DO  02/12/21 5294

## 2021-02-12 NOTE — ED NOTES
Working on bed upstairs. Will dc from er to upstairs room once assigned.      Dejon Erickson RN  02/12/21 5699

## 2021-02-13 ENCOUNTER — HOSPITAL ENCOUNTER (EMERGENCY)
Age: 31
Discharge: LEFT BEFORE DISCHARGE | End: 2021-02-13
Attending: EMERGENCY MEDICINE
Payer: MEDICAID

## 2021-02-13 VITALS
OXYGEN SATURATION: 99 % | SYSTOLIC BLOOD PRESSURE: 112 MMHG | TEMPERATURE: 98.7 F | BODY MASS INDEX: 22.35 KG/M2 | WEIGHT: 165 LBS | HEIGHT: 72 IN | RESPIRATION RATE: 19 BRPM | DIASTOLIC BLOOD PRESSURE: 65 MMHG | HEART RATE: 90 BPM

## 2021-02-13 VITALS
BODY MASS INDEX: 20.19 KG/M2 | DIASTOLIC BLOOD PRESSURE: 74 MMHG | HEART RATE: 84 BPM | HEIGHT: 72 IN | TEMPERATURE: 97.9 F | SYSTOLIC BLOOD PRESSURE: 129 MMHG | OXYGEN SATURATION: 97 % | WEIGHT: 149.03 LBS | RESPIRATION RATE: 16 BRPM

## 2021-02-13 DIAGNOSIS — F11.10 OPIOID ABUSE (HCC): Primary | ICD-10-CM

## 2021-02-13 LAB
AMPHETAMINES: NEGATIVE
BACTERIA: NEGATIVE /HPF
BARBITURATE SCREEN URINE: NEGATIVE
BENZODIAZEPINE SCREEN, URINE: NEGATIVE
BILIRUBIN URINE: NEGATIVE MG/DL
BLOOD, URINE: NEGATIVE
CANNABINOID SCREEN URINE: NEGATIVE
CLARITY: CLEAR
COCAINE METABOLITE: ABNORMAL
COLOR: YELLOW
GLUCOSE, URINE: NEGATIVE MG/DL
KETONES, URINE: ABNORMAL MG/DL
LEUKOCYTE ESTERASE, URINE: NEGATIVE
MUCUS: ABNORMAL HPF
NITRITE URINE, QUANTITATIVE: NEGATIVE
OPIATES, URINE: NEGATIVE
OXYCODONE: NEGATIVE
PH, URINE: 6 (ref 5–8)
PHENCYCLIDINE, URINE: NEGATIVE
PROTEIN UA: NEGATIVE MG/DL
RBC URINE: 6 /HPF (ref 0–3)
SPECIFIC GRAVITY UA: 1.02 (ref 1–1.03)
TRICHOMONAS: ABNORMAL /HPF
UROBILINOGEN, URINE: NEGATIVE MG/DL (ref 0.2–1)
WBC UA: 2 /HPF (ref 0–2)

## 2021-02-13 PROCEDURE — 85025 COMPLETE CBC W/AUTO DIFF WBC: CPT

## 2021-02-13 PROCEDURE — 81001 URINALYSIS AUTO W/SCOPE: CPT

## 2021-02-13 PROCEDURE — 99283 EMERGENCY DEPT VISIT LOW MDM: CPT

## 2021-02-13 PROCEDURE — 6370000000 HC RX 637 (ALT 250 FOR IP): Performed by: NURSE PRACTITIONER

## 2021-02-13 PROCEDURE — 80307 DRUG TEST PRSMV CHEM ANLYZR: CPT

## 2021-02-13 PROCEDURE — G0378 HOSPITAL OBSERVATION PER HR: HCPCS

## 2021-02-13 PROCEDURE — 6370000000 HC RX 637 (ALT 250 FOR IP): Performed by: INTERNAL MEDICINE

## 2021-02-13 RX ORDER — PROMETHAZINE HYDROCHLORIDE 25 MG/1
25 TABLET ORAL EVERY 6 HOURS PRN
Status: DISCONTINUED | OUTPATIENT
Start: 2021-02-13 | End: 2021-02-13 | Stop reason: HOSPADM

## 2021-02-13 RX ORDER — LANOLIN ALCOHOL/MO/W.PET/CERES
3 CREAM (GRAM) TOPICAL NIGHTLY
Status: DISCONTINUED | OUTPATIENT
Start: 2021-02-13 | End: 2021-02-13 | Stop reason: HOSPADM

## 2021-02-13 RX ORDER — HYDROXYZINE PAMOATE 25 MG/1
50 CAPSULE ORAL EVERY 8 HOURS PRN
Status: DISCONTINUED | OUTPATIENT
Start: 2021-02-13 | End: 2021-02-13 | Stop reason: HOSPADM

## 2021-02-13 RX ORDER — TRAZODONE HYDROCHLORIDE 50 MG/1
50 TABLET ORAL NIGHTLY PRN
Status: DISCONTINUED | OUTPATIENT
Start: 2021-02-13 | End: 2021-02-13 | Stop reason: HOSPADM

## 2021-02-13 RX ORDER — IBUPROFEN 800 MG/1
800 TABLET ORAL EVERY 8 HOURS PRN
Status: DISCONTINUED | OUTPATIENT
Start: 2021-02-13 | End: 2021-02-13 | Stop reason: HOSPADM

## 2021-02-13 RX ORDER — DICYCLOMINE HCL 20 MG
20 TABLET ORAL EVERY 6 HOURS PRN
Status: DISCONTINUED | OUTPATIENT
Start: 2021-02-13 | End: 2021-02-13 | Stop reason: HOSPADM

## 2021-02-13 RX ORDER — GABAPENTIN 300 MG/1
300 CAPSULE ORAL EVERY 8 HOURS PRN
Status: DISCONTINUED | OUTPATIENT
Start: 2021-02-13 | End: 2021-02-13 | Stop reason: HOSPADM

## 2021-02-13 RX ADMIN — CLONIDINE HYDROCHLORIDE 0.1 MG: 0.1 TABLET ORAL at 01:43

## 2021-02-13 RX ADMIN — HYDROXYZINE PAMOATE 50 MG: 25 CAPSULE ORAL at 01:43

## 2021-02-13 RX ADMIN — DIPHENHYDRAMINE HYDROCHLORIDE 25 MG: 25 TABLET ORAL at 02:24

## 2021-02-13 RX ADMIN — PROMETHAZINE HYDROCHLORIDE 12.5 MG: 25 TABLET ORAL at 01:43

## 2021-02-13 RX ADMIN — DICYCLOMINE HYDROCHLORIDE 20 MG: 20 TABLET ORAL at 01:43

## 2021-02-13 RX ADMIN — PANTOPRAZOLE SODIUM 40 MG: 40 TABLET, DELAYED RELEASE ORAL at 06:27

## 2021-02-13 RX ADMIN — BUPRENORPHINE HCL 2 MG: 2 TABLET SUBLINGUAL at 01:44

## 2021-02-13 RX ADMIN — GABAPENTIN 300 MG: 300 CAPSULE ORAL at 01:43

## 2021-02-13 ASSESSMENT — PAIN SCALES - GENERAL
PAINLEVEL_OUTOF10: 0
PAINLEVEL_OUTOF10: 0

## 2021-02-13 NOTE — ED NOTES
[id number] paged hospitalist     [facility]  02/13/21 2001 Garth Tobias  [phone] hospitalist returned call      [facility]  02/13/21 5922

## 2021-02-13 NOTE — PROGRESS NOTES
Patient came for readmission  Saw patient in the ED, claims he is willing to be readmitted again after having smoked his cigarettes. Informed patient that we will continue management here in the ED and I will speak with  for [facility] referral. Once this is confirmed;  will discharge on 1 week of Suboxone prescription. Patient agreed on the plan and ED physician informed. Received another page that patient has eloped again before treatment.

## 2021-02-13 NOTE — PROGRESS NOTES
Call initiated by: Nursing staff:  Dante Rosa  Call addressed around: 2/12/2021 9:30 PM  Reason for call: Patient flight risk and has no IV  Orders placed: Discontinue fluids    KWAME Mary - CNP

## 2021-02-13 NOTE — DISCHARGE SUMMARY
Discharge Summary    Name:  Cindi Lopez /Age/Sex: 1990  (35 y.o. male)   MRN & CSN:  [id number] & [id number] Admission Date/Time: 2021  6:13 PM   Attending:  Amie Costello MD Discharging Physician: Lorena Vee MD     Hospital Course:   Cindi Lopez is a 35 y.o.  male  who presents with Opioid abuse with withdrawal     Patient was seen  Was notified that he wanted to leave AMA by his RN because he needed to smoke cigarette. Spoke to patient to stay but he declined to stay. AMA was signed      Conditions managed:    1) Opioid abuse with withdrawal   -On subutex protocol    2) Hyponatremia (133), could be 2/2 diarrhea  -Rehydrated with IVF NS     3) Acute Toxic encephalopathy, likely 2/2 heroin abuse  -Improved this morning     Tobacco abuse - on Nicotine patch, tobacco cessation education.      Chronic Illnesses:        -hepatitis C treatment naive       -hypertension -on clonidine, lisinopril       -Tourette's           The patient expressed appropriate understanding of and agreement with the discharge recommendations, medications, and plan. Consults this admission:  IP CONSULT TO SOCIAL WORK    Discharge Instruction:   Follow up appointments:   Primary care physician:  within 2 weeks    Diet:  regular diet   Activity: activity as tolerated  Disposition: Discharged to:   [x] AMA, []C, []SNF, []Acute Rehab, []Hospice   Condition on discharge: Stable    Discharge Medications:      Genesis Binathanieli   Home Medication Instructions ISO:[id number]    Printed on:21 0801   Medication Information                      albuterol sulfate HFA (PROVENTIL HFA) 108 (90 Base) MCG/ACT inhaler  Inhale 2 puffs into the lungs every 4 hours as needed for Wheezing or Shortness of Breath With spacer (and mask if indicated). Thanks.              cloNIDine (CATAPRES) 0.3 MG tablet  Take 1 tablet by mouth 2 times daily             lisinopril (PRINIVIL;ZESTRIL) 5 MG tablet  Take 1 tablet by mouth daily             loperamide (RA ANTI-DIARRHEAL) 2 MG capsule  Take 1 capsule by mouth 4 times daily as needed for Diarrhea             mirtazapine (REMERON) 15 MG tablet  Take 15 mg by mouth nightly             naproxen (NAPROSYN) 500 MG tablet  Take 1 tablet by mouth 2 times daily             nicotine (NICODERM CQ) 21 MG/24HR  Place 1 patch onto the skin every 24 hours             ondansetron (ZOFRAN ODT) 4 MG disintegrating tablet  Take 1 tablet by mouth every 8 hours as needed for Nausea             ondansetron (ZOFRAN ODT) 4 MG disintegrating tablet  Take 1 tablet by mouth every 8 hours as needed for Nausea             QUEtiapine (SEROQUEL) 25 MG tablet  Take 1 tablet by mouth 3 times daily             Spacer/Aero-Holding Chambers (E-Z SPACER) PARKER  1 Device by Does not apply route daily as needed (for use with inhaler for wheezing)             tamsulosin (FLOMAX) 0.4 MG capsule  Take 1 capsule by mouth daily for 10 doses                 Objective Findings at Discharge:   /74   Pulse 84   Temp 97.9 °F (36.6 °C) (Oral)   Resp 16   Ht 6' (1.829 m)   Wt 149 lb 0.5 oz (67.6 kg)   SpO2 97%   BMI 20.21 kg/m²            PHYSICAL EXAM   Not examined  BMP/CBC  Recent Labs     02/12/21  1450   *   K 3.9      CO2 19*   BUN 13   CREATININE 0.7*       IMAGING:    Electronically signed by Lis López MD on 2/13/2021 at 8:01 AM

## 2021-02-13 NOTE — H&P
History and Physical  KWAME Earl-BC   Internal Medicine Hospitalist      Name:  Jovanni Miranda /Age/Sex: 1990  (35 y.o. male)   MRN & CSN:  [id number] & [id number] Admission Date/Time: 2021  6:13 PM   [facility]  [facility] PCP: No primary care provider on file. Hospital Day: 1      Supervising Physician: Dr. Thomas Louie     Chief Complaint: nausea, vomiting, headache, diarrhea, and not feeling well     Assessment and Plan:   Jovanni Miranda is a 35 y.o. male who presents with Opioid abuse with withdrawal     Opioid abuse with withdrawal - reports last heroin use 2 days ago at 6 PM, but he reported to the ED physician last heroin use was yesterday at 6 PM.       -known h/o polysubstance abuse       -Opiate/COWS assessment     -on Melatonin, prn Subutex, clonidine      -on prn Bentyl, Benadryl, Vistaril, antiemetic     -consult  team     -Pending UDS, UA        Hyponatremia (133), could be 2/2 diarrhea   Diarrhea - denied abd pain/discomfort on examination.       -cont IVF       -on prn Imodium       -monitor CMP     Acute encephalopathy, likely 2/2 heroin abuse       -only oriented to self and situation but disoriented to person, place, and time on examination       -Neuro checks     Tobacco abuse - on Nicotine patch, tobacco cessation education.  Chronic Illnesses:        -hepatitis C       -hypertension -on clonidine, lisinopril       -Tourette's     Current diagnosis and plan of management discussed with the patient at the time of admission in lay language who agree to the above plan and disposition of admission for further care. All concerns and questions addressed.       Patient assessment and plan in conjunction with supervising physician - Dr. Miguel Jiang;    DVT Prophylaxis [x] Lovenox, []  Heparin, [] SCDs, [] Ambulation  [] Long term AC   GI Prophylaxis [x] PPI,  [] H2 Blocker,  [] Carafate,  [] Diet, [] No GI prophylaxis, N/A: patient is not under significant medical stress, non-ICU or is receiving a diet/tube feeds   Code Status Full Code   Disposition Patient requires continued admission due to Opioid abuse with withdrawal.   Discharge Plan: Patient plans to discharge to [facility] after seen by  team.    MDM [] Low, [x] Moderate,[]  High  Patient's risk as above due to:      [x] One or more chronic illnesses with mild exacerbation progression      [] Two or more stable chronic illnesses      [] Undiagnosed new problem with uncertain prognosis      [] Elective major surgery      []Prescription drug management     History of Present Illness:     Principal Problem: Opioid abuse with withdrawal  Huang Cordero is a 35 y.o. male with past medical history of heroin abuse/polysubstance abuse, hepatitis C, hypertension, Tourette's, and tobacco abuse was a direct admit for heroine detoxification and with complaints of nausea, vomiting, headache, diarrhea, and not feeling well. Patient reports that he was sent to the ED by [facility] staff for heroine withdrawal and for feeling sick. Patient stated that his last heroin use was 2 days ago at 6 PM however, he reported to the ED physician that his last heroin use was yesterday at 6 PM.  Patient is presently awake and alert but confused. He is oriented to self and situation but disoriented to person, place, and time. According to bedside nurse, patient was discharged from ED and supposed to go to [facility] but apparently [facility] was unable to admit patient due to withdrawal symptoms. Patient denies chest pain, palpitation, fever, chills or diaphoresis, cough, and SOB or difficulty breathing. No acute respiratory distress noted while on room air. Denies any other symptoms including paresthesias, abdominal pain, dysuria, hematuria, frequency or urgency, and B/L weakness. Upon interview, the patient provided the history as above. ED Course: Discussed case with ED physician prior to admission. ROS: Ten point ROS reviewed and negative, unless as noted above per HPI. Objective:   No intake or output data in the 24 hours ending 02/12/21 1949     Vitals:   Vitals:    02/12/21 1815   BP: 128/71   Pulse: 55   Resp: 16   Temp: 98.5 °F (36.9 °C)   TempSrc: Oral     Physical Exam: 02/12/21    GEN  -Awake, alert, appearing confused, eyes:   male, lying in bed, cooperative, able to give adequate history. Appears given age. EYES -Pupils are equally round. No vision changes. No scleral erythema, discharge, or conjunctivitis. HENT -MM are moist. Oral pharynx without exudates, no evidence of thrush. NECK -Supple, no apparent thyromegaly or masses. RESP -LS CTA equal bilat, no wheezes, rales or rhonchi. Symmetric chest movement. No respiratory distress noted. C/V  -S1/S2 auscultated. RRR without appreciable M/R/G. No JVD or carotid bruits. Peripheral pulses equal bilaterally and palpable. Peripheral pulses equal bilaterally and palpable. No peripheral edema. No reproducible chest wall tenderness. GI  -Abdomen is soft, round, non-distended, no significant tenderness. No masses or guarding. + BS in all quadrants. Rectal exam deferred.   -Polk catheter is not present. LYMPH  -No palpable cervical lymphadenopathy and no hepatosplenomegaly. No petechiae or ecchymoses. MS  -B/L extremities strong muscles strength. Full movements. No gross joint deformities. No swelling, intact sensation symmetrical.   SKIN  -Normal coloration, warm, dry. No open wounds or ulcers. NEURO  -CN 2-12 appear grossly intact, normal speech, no lateralizing weakness. PSYC  -Awake, alert, oriented to self and situation but disoriented to person, place, and time. Anxious.     Past Medical History:      Past Medical History:   Diagnosis Date    Hepatitis C     Hypertension     Tourette's     when really stressed shows up     Past Surgery History:  Patient  has a past surgical history that includes Arm Surgery (Left, 08/23/2016) and bronchoscopy (N/A, 8/23/2019). Social History:    FAM HX: Assessed: family history includes No Known Problems in his father and mother. Soc HX:   Social History     Socioeconomic History    Marital status: Single     Spouse name: None    Number of children: None    Years of education: None    Highest education level: None   Occupational History    None   Social Needs    Financial resource strain: None    Food insecurity     Worry: None     Inability: None    Transportation needs     Medical: None     Non-medical: None   Tobacco Use    Smoking status: Current Every Day Smoker     Packs/day: 1.00     Types: Cigarettes    Smokeless tobacco: Never Used   Substance and Sexual Activity    Alcohol use: No     Comment: rarely    Drug use: Yes     Comment: +cocaine, benzo, opiates    Sexual activity: Yes     Partners: Female   Lifestyle    Physical activity     Days per week: None     Minutes per session: None    Stress: None   Relationships    Social connections     Talks on phone: None     Gets together: None     Attends [employer] service: None     Active member of club or organization: None     Attends meetings of clubs or organizations: None     Relationship status: None    Intimate partner violence     Fear of current or ex partner: None     Emotionally abused: None     Physically abused: None     Forced sexual activity: None   Other Topics Concern    None   Social History Narrative    None     TOBACCO:   reports that he has been smoking cigarettes. He has been smoking about 1.00 pack per day. He has never used smokeless tobacco.  ETOH:   reports no history of alcohol use. Drugs:  reports current drug use.     Allergies: No Known Allergies  Medications:   Medications:    sodium chloride flush  10 mL Intravenous 2 times per day    enoxaparin  40 mg Subcutaneous Nightly    melatonin  3 mg Oral Nightly    cloNIDine  0.3 mg Oral BID  lisinopril  5 mg Oral Daily    mirtazapine  15 mg Oral Nightly    nicotine  1 patch Transdermal Q24H    QUEtiapine  25 mg Oral TID    [START ON 2/13/2021] pantoprazole  40 mg Oral QAM AC      Infusions:   PRN Meds:     sodium chloride flush, 10 mL, PRN      promethazine, 12.5 mg, Q6H PRN    Or      ondansetron, 4 mg, Q6H PRN      polyethylene glycol, 17 g, Daily PRN      acetaminophen, 650 mg, Q6H PRN    Or      acetaminophen, 650 mg, Q6H PRN      buprenorphine, 2 mg, PRN    And      cloNIDine, 0.1 mg, PRN      dicyclomine, 20 mg, Q6H PRN      gabapentin, 300 mg, Q8H PRN      ibuprofen, 800 mg, Q8H PRN      diphenhydrAMINE, 25 mg, Nightly PRN      hydrOXYzine, 50 mg, Q8H PRN      loperamide, 2 mg, 4x Daily PRN      Prior to Admission Meds:  Prior to Admission medications    Medication Sig Start Date End Date Taking?  Authorizing Provider   ondansetron (ZOFRAN ODT) 4 MG disintegrating tablet Take 1 tablet by mouth every 8 hours as needed for Nausea 2/12/21   Dondra Saint, MD   loperamide (RA ANTI-DIARRHEAL) 2 MG capsule Take 1 capsule by mouth 4 times daily as needed for Diarrhea 2/12/21 2/22/21  Dondra Saint, MD   cloNIDine (CATAPRES) 0.3 MG tablet Take 1 tablet by mouth 2 times daily 2/5/21   Emiliano Cullen,    ondansetron (ZOFRAN ODT) 4 MG disintegrating tablet Take 1 tablet by mouth every 8 hours as needed for Nausea 6/18/20   Rochelleamarjit Phamhore, DO   tamsulosin (FLOMAX) 0.4 MG capsule Take 1 capsule by mouth daily for 10 doses 6/18/20 6/28/20  Rochelle Phamholilliana, DO   naproxen (NAPROSYN) 500 MG tablet Take 1 tablet by mouth 2 times daily 6/18/20   Rochelle Phamhore, DO   lisinopril (PRINIVIL;ZESTRIL) 5 MG tablet Take 1 tablet by mouth daily 8/29/19   Avila Ibarra MD   QUEtiapine (SEROQUEL) 25 MG tablet Take 1 tablet by mouth 3 times daily 8/28/19   Avila Ibarra MD   Spacer/Aero-Holding Chambers (E-Z SPACER) PARKER 1 Device by Does not apply route daily as needed (for use with inhaler for wheezing) 10/20/18   Shaunna Oppenheim, MD   nicotine (NICODERM CQ) 21 MG/24HR Place 1 patch onto the skin every 24 hours 10/20/18   Shaunna Oppenheim, MD   albuterol sulfate HFA (PROVENTIL HFA) 108 (90 Base) MCG/ACT inhaler Inhale 2 puffs into the lungs every 4 hours as needed for Wheezing or Shortness of Breath With spacer (and mask if indicated). Thanks. 10/20/18 11/19/18  Shaunna Oppenheim, MD   mirtazapine (REMERON) 15 MG tablet Take 15 mg by mouth nightly    Historical Provider, MD     Data:     Laboratory this visit:  Reviewed  No results for input(s): WBC, HGB, HCT, PLT in the last 72 hours. Recent Labs     02/12/21  1450   *   K 3.9      CO2 19*   BUN 13   CREATININE 0.7*     Recent Labs     02/12/21  1450   AST 22   ALT 19   BILITOT 0.4   ALKPHOS 88     No results for input(s): INR in the last 72 hours. No results for input(s): CKTOTAL, CKMB, CKMBINDEX in the last 72 hours. Invalid input(s): Letty Escobar input(s): PRO-BNP    Radiology this visit:  Reviewed. No results found. Current Treatment Team:  Treatment Team: Attending Provider: Giovanni Jimenez MD; Registered Nurse: Rodrigo De Souza LPN; Registered Nurse: Lu Benitez, [facility]  2/12/2021 7:49 PM      Electronically signed by KWAME Larsen CNP on 2/12/2021 at 7:49 PM

## 2021-02-13 NOTE — ED PROVIDER NOTES
EMERGENCY DEPARTMENT ENCOUNTER    Patient: Valerie Hayes  MRN: [id number]  : 1990  Date of Evaluation: 2021  ED Provider:  Jose Ha    CHIEF COMPLAINT  Chief Complaint   Patient presents with    Other     heroin user, was inpatient left \"for a cigarette\", doesnt want to leave and overdose again        HPI  Valerie Hayes is a 35 y.o. male who presents after just leaving from [address] [place] [address]. He had been undergoing treatment for detox from opioids and left AGAINST MEDICAL ADVICE, stating he needed to smoke a cigarette. This did occur about 15 minutes prior to his arrival to the emergency department. He states he did go out and smoke a cigarette and tried to call several people for a ride but was unsuccessful in getting anyone to come pick him up. Denies any other associated symptoms or complaints or concerns.       REVIEW OF SYSTEMS    Constitutional: negative for fever, chills  Neurological: negative for HA, focal weakness, loss of sensation  Ophthalmic: negative for vision change  ENT: negative for congestion, rhinorrhea  Cardiovascular: negative for chest pain  Respiratory: negative for SOB, cough  GI: negative for abdominal pain, nausea, vomiting, diarrhea, constipation  : negative for dysuria, hematuria  Musculoskeletal: negative for myalgias, decreased ROM, joint swelling  Dermatological: negative for rash, wounds  Heme: Negative for bleeding, bruising      PAST MEDICAL HISTORY  Past Medical History:   Diagnosis Date    Hepatitis C     Hypertension     Tourette's     when really stressed shows up       CURRENT MEDICATIONS  [unfilled]    ALLERGIES  No Known Allergies    SURGICAL HISTORY  Past Surgical History:   Procedure Laterality Date    ARM SURGERY Left 2016    incision & drainage    BRONCHOSCOPY N/A 2019    BRONCHOSCOPY ALVEOLAR LAVAGE AND CLEAN OUT [facility] normal, No discharge  HENT: Normocephalic, Atraumatic,  oropharynx moist  Neck: Supple, no stridor, no grossly visible or palpable masses  Cardiovascular: Regular rate and rhythm, No murmurs, No rubs, No gallops  Pulmonary/Chest: Normal breath sounds, No respiratory distress or accessory muscle use, No wheezing, crackles or rhonchi. Abdomen: Soft, nondistended and nonrigid, No tenderness or peritoneal signs, No masses, normal bowel sounds  Extremities: No gross deformities, no edema, no tenderness  Skin: Warm, Dry, No erythema, No rash, No cyanosis, No mottling  Psychiatric: Alert and oriented x3, Affect normal        RADIOLOGY/PROCEDURES/LABS/MEDICATIONS ADMINISTERED:    I have reviewed and interpreted all of the currently available lab results from this visit (if applicable):  No results found for this visit on 02/13/21. ABNORMAL LABS:  Labs Reviewed - No data to display      IMAGING STUDIES ORDERED:  IP CONSULT TO HOSPITALIST  ALCOHOL AND OR DRUG ASSESSMENT      No orders to display         MEDICATIONS ADMINISTERED:  Medications   melatonin tablet 3 mg (has no administration in time range)   dicyclomine (BENTYL) tablet 20 mg (has no administration in time range)   ibuprofen (ADVIL;MOTRIN) tablet 800 mg (has no administration in time range)   gabapentin (NEURONTIN) capsule 300 mg (has no administration in time range)   hydrOXYzine (VISTARIL) capsule 50 mg (has no administration in time range)   promethazine (PHENERGAN) tablet 25 mg (has no administration in time range)   traZODone (DESYREL) tablet 50 mg (has no administration in time range)         COURSE & MEDICAL DECISION MAKING  Last vitals: /65   Pulse 90   Temp 98.7 °F (37.1 °C) (Oral)   Resp 19   Ht 6' (1.829 m)   Wt 165 lb (74.8 kg)   SpO2 99%   BMI 22.38 kg/m²     67-year-old male with opioid abuse who just left AGAINST MEDICAL ADVICE from upstairs after inpatient detox.   I did call and speak with the hospitalist who was caring for him this morning and asked if he would be agreeable to readmitting him, and he initially was and was going to come down and speak with the patient however the patient eloped without warning prior to readmission. I did not get discussed the risks and benefits of staying versus leaving with the patient prior to him leaving due to him eloping without notification to me. Clinical Impression:  1. Opioid abuse ([address]        Disposition referral (if applicable):  No follow-up provider specified. Disposition medications (if applicable):  Discharge Medication List as of 2/13/2021  9:09 AM          ED Provider Disposition Time  DISPOSITION Eloped - Left Before Treatment Complete 02/13/2021 08:56:41 AM          Electronically signed by: Xavi Houston M.D., 2/13/2021 9:15 AM      This dictation was created with voice recognition software. While attempts have been made to review the dictation as it is transcribed, on occasion the spoken word can be misinterpreted by the technology leading to omissions or inappropriate words, phrases or sentences.         Beti Carlton MD  02/13/21 7103

## 2021-02-13 NOTE — PROGRESS NOTES
Pt wanted a cigarett,  Was uip looking  For his cigaretts, was informed no  Smoking, says he's going  To go smoke,  Says he's leaving, DR Lynetta Cabot was notified, came and spoke to pt, pt still wants to leave, signed the ama paper, was instructed that he was leaving  Agustin Still 35, PT  TOOK OFF  NICOTINE PATCH, AND LEFT

## 2021-08-31 ENCOUNTER — HOSPITAL ENCOUNTER (EMERGENCY)
Age: 31
Discharge: HOME | End: 2021-08-31
Payer: MEDICAID

## 2021-08-31 VITALS
WEIGHT: 165 LBS | OXYGEN SATURATION: 98 % | SYSTOLIC BLOOD PRESSURE: 126 MMHG | HEART RATE: 89 BPM | TEMPERATURE: 97.9 F | RESPIRATION RATE: 18 BRPM | DIASTOLIC BLOOD PRESSURE: 86 MMHG | HEIGHT: 73 IN | BODY MASS INDEX: 21.87 KG/M2

## 2021-08-31 DIAGNOSIS — F19.90 IVDU (INTRAVENOUS DRUG USER): ICD-10-CM

## 2021-08-31 DIAGNOSIS — L08.9 INFECTION OF FINGER: Primary | ICD-10-CM

## 2021-08-31 PROCEDURE — 99285 EMERGENCY DEPT VISIT HI MDM: CPT

## 2021-08-31 PROCEDURE — 6370000000 HC RX 637 (ALT 250 FOR IP): Performed by: PHYSICIAN ASSISTANT

## 2021-08-31 RX ORDER — CEPHALEXIN 500 MG/1
500 CAPSULE ORAL 4 TIMES DAILY
Qty: 28 CAPSULE | Refills: 0 | Status: SHIPPED | OUTPATIENT
Start: 2021-08-31 | End: 2021-08-31 | Stop reason: SDUPTHER

## 2021-08-31 RX ORDER — CEPHALEXIN 250 MG/1
500 CAPSULE ORAL ONCE
Status: COMPLETED | OUTPATIENT
Start: 2021-08-31 | End: 2021-08-31

## 2021-08-31 RX ORDER — SULFAMETHOXAZOLE AND TRIMETHOPRIM 800; 160 MG/1; MG/1
1 TABLET ORAL 2 TIMES DAILY
Qty: 14 TABLET | Refills: 0 | Status: SHIPPED | OUTPATIENT
Start: 2021-08-31 | End: 2021-08-31 | Stop reason: SDUPTHER

## 2021-08-31 RX ORDER — SULFAMETHOXAZOLE AND TRIMETHOPRIM 800; 160 MG/1; MG/1
1 TABLET ORAL 2 TIMES DAILY
Qty: 14 TABLET | Refills: 0 | Status: SHIPPED | OUTPATIENT
Start: 2021-08-31 | End: 2021-09-07

## 2021-08-31 RX ORDER — SULFAMETHOXAZOLE AND TRIMETHOPRIM 800; 160 MG/1; MG/1
1 TABLET ORAL ONCE
Status: COMPLETED | OUTPATIENT
Start: 2021-08-31 | End: 2021-08-31

## 2021-08-31 RX ORDER — CEPHALEXIN 500 MG/1
500 CAPSULE ORAL 4 TIMES DAILY
Qty: 28 CAPSULE | Refills: 0 | Status: SHIPPED | OUTPATIENT
Start: 2021-08-31 | End: 2021-09-07

## 2021-08-31 RX ADMIN — SULFAMETHOXAZOLE AND TRIMETHOPRIM 1 TABLET: 800; 160 TABLET ORAL at 13:44

## 2021-08-31 RX ADMIN — CEPHALEXIN 500 MG: 250 CAPSULE ORAL at 13:44

## 2021-08-31 ASSESSMENT — PAIN DESCRIPTION - LOCATION: LOCATION: OTHER (COMMENT)

## 2021-08-31 ASSESSMENT — PAIN DESCRIPTION - ORIENTATION: ORIENTATION: RIGHT

## 2021-08-31 ASSESSMENT — PAIN SCALES - GENERAL: PAINLEVEL_OUTOF10: 10

## 2021-08-31 ASSESSMENT — PAIN DESCRIPTION - PAIN TYPE: TYPE: ACUTE PAIN

## 2021-08-31 NOTE — ED PROVIDER NOTES
Triage Chief Complaint:   Abscess    [place]:  Today in the ED I had the pleasure of caring for Lotus Cunningham who is a 28 y.o. male that presents *the emergency department right third finger digit pain. Patient has a history of IVDU in the past.  States he developed redness small pimple on his finger 1 week ago is continually worsened and gotten larger. He is concerned for infection.   No fever chills nausea vomit diarrhea    ROS:  REVIEW OF SYSTEMS    At least 10 systems reviewed      All other review of systems are negative  See HPI and nursing notes for additional information       Past Medical History:   Diagnosis Date    Hepatitis C     Hypertension     Tourette's     when really stressed shows up     Past Surgical History:   Procedure Laterality Date    ARM SURGERY Left 08/23/2016    incision & drainage    BRONCHOSCOPY N/A 8/23/2019    BRONCHOSCOPY ALVEOLAR LAVAGE AND CLEAN OUT performed by Raquel Diaz MD at [facility] ENDOSCOPY     Family History   Problem Relation Age of Onset    No Known Problems Mother     No Known Problems Father      Social History     Socioeconomic History    Marital status: Single     Spouse name: Not on file    Number of children: Not on file    Years of education: Not on file    Highest education level: Not on file   Occupational History    Not on file   Tobacco Use    Smoking status: Current Every Day Smoker     Packs/day: 1.00     Types: Cigarettes    Smokeless tobacco: Never Used   Substance and Sexual Activity    Alcohol use: No     Comment: rarely    Drug use: Yes     Types: Methamphetamines, Opiates      Comment: +cocaine, benzo, opiates    Sexual activity: Yes     Partners: Female   Other Topics Concern    Not on file   Social History Narrative    Not on file     Social Determinants of Health     Financial Resource Strain:     Difficulty of Paying Living Expenses:    Food Insecurity:     Worried About Running Out of Food in the Last Year:     Ran Out of Food in the Last Year:    Transportation Needs:     Lack of Transportation (Medical):      Lack of Transportation (Non-Medical):    Physical Activity:     Days of Exercise per Week:     Minutes of Exercise per Session:    Stress:     Feeling of Stress :    Social Connections:     Frequency of Communication with Friends and Family:     Frequency of Social Gatherings with Friends and Family:     Attends Mu-ism Services:     Active Member of Clubs or Organizations:     Attends Club or Organization Meetings:     Marital Status:    Intimate Partner Violence:     Fear of Current or Ex-Partner:     Emotionally Abused:     Physically Abused:     Sexually Abused:      Current Facility-Administered Medications   Medication Dose Route Frequency Provider Last Rate Last Admin    cephALEXin (KEFLEX) capsule 500 mg  500 mg Oral Once [employer], PA-C        sulfamethoxazole-trimethoprim (BACTRIM DS;SEPTRA DS) 800-160 MG per tablet 1 tablet  1 tablet Oral Once [employer], PA-C         Current Outpatient Medications   Medication Sig Dispense Refill    cephALEXin (KEFLEX) 500 MG capsule Take 1 capsule by mouth 4 times daily for 7 days 28 capsule 0    sulfamethoxazole-trimethoprim (BACTRIM DS) 800-160 MG per tablet Take 1 tablet by mouth 2 times daily for 7 days 14 tablet 0    ondansetron (ZOFRAN ODT) 4 MG disintegrating tablet Take 1 tablet by mouth every 8 hours as needed for Nausea 15 tablet 0    cloNIDine (CATAPRES) 0.3 MG tablet Take 1 tablet by mouth 2 times daily 60 tablet 3    ondansetron (ZOFRAN ODT) 4 MG disintegrating tablet Take 1 tablet by mouth every 8 hours as needed for Nausea 15 tablet 0    tamsulosin (FLOMAX) 0.4 MG capsule Take 1 capsule by mouth daily for 10 doses 10 capsule 3    naproxen (NAPROSYN) 500 MG tablet Take 1 tablet by mouth 2 times daily 60 tablet 0    lisinopril (PRINIVIL;ZESTRIL) 5 MG tablet Take 1 tablet by mouth daily 30 tablet 3    QUEtiapine (SEROQUEL) 25 MG tablet Take 1 tablet by mouth 3 times daily 60 tablet 3    Spacer/Aero-Holding Chambers (E-Z SPACER) PARKER 1 Device by Does not apply route daily as needed (for use with inhaler for wheezing) 1 Device 0    nicotine (NICODERM CQ) 21 MG/24HR Place 1 patch onto the skin every 24 hours 30 patch 3    albuterol sulfate HFA (PROVENTIL HFA) 108 (90 Base) MCG/ACT inhaler Inhale 2 puffs into the lungs every 4 hours as needed for Wheezing or Shortness of Breath With spacer (and mask if indicated). Thanks. 1 Inhaler 1    mirtazapine (REMERON) 15 MG tablet Take 15 mg by mouth nightly       No Known Allergies    Nursing Notes Reviewed    Physical Exam:  ED Triage Vitals [08/31/21 1244]   Enc Vitals Group      /76      Pulse 83      Resp 18      Temp 97.9 °F (36.6 °C)      Temp Source Oral      SpO2 99 %      Weight 165 lb (74.8 kg)      Height 6' 1\" (1.854 m)      Head Circumference       Peak Flow       Pain Score       Pain Loc       Pain Edu? Excl. in [address]? General :Patient is awake alert oriented person place and time no acute distress nontoxic appearing  HEENT: Pupils are equally round and reactive to light extraocular motors are intact conjunctivae clear sclerae white there is no injection no icterus. Nose without any rhinorrhea or epistaxis. Oral mucosa is moist no exudate buccal mucosa shows no ulcerations. Uvula is midline    nd rigidity or guarding negative Campuzano's negative McBurney, no peritoneal signs  Suprapubic:  there is no tenderness to palpation over the external bladder   Musculoskeletal: 5 out of 5 strength in all 4 extremities full flexion extension abduction and adduction supination pronation of all extremities and all digits. No obvious muscle atrophy is noted. No focal muscle deficits are appreciated. MUSCULOSKELETAL: HAND/WRIST:  Bones of the hand and wrist are not tender to palpation. Anatomic snuffbox is not tender to palpation.  No pain with axial loading of scaphoid. Joints exhibit active range of motion without ligamentous laxity or instability. All tendons tested actively and against resistance without laxity. Subungual hematomas and nail injuries are absent. Radial pulse is  present. Capillary refill is  less than 2 seconds. Sensation is  intact in the median, ulnar and radial nerve distributions. Strenght is  intact in the median, ulnar and radial nerve distributions. Cyanosis, erythema, and pallor are absent. There is no tenderness palpation over any of patient's carpal bones metacarpal bones. All of patient's phalanges are non tender. Distal sensation is intact in all digits. Flexion and extension lateral motion actively against resistance is present in all directions. In all digits. Including flexion extension abduction and adduction and opposition of the thumb  R 3rd digit along PIP does have significant edema and redness, no fluctuance no palpable or visible abscess. Full arom of affected digit with tracey cap refill  Dermatology: Skin is warm and dry there is no obvious abscesses lacerations or lesions noted  Psych: Mentation is grossly normal cognition is grossly normal. Affect is appropriate  Neuro: Motor intact sensory intact cranial nerves II through XII are intact level of consciousness is normal cerebellar function is normal reflexes are grossly normal. No evidence of incontinence or loss of bowel or bladder no saddle anesthesia noted Lymphatic: There is no submandibular or cervical adenopathy appreciated. I have reviewed and interpreted all of the currently available lab results from this visit (if applicable):  No results found for this visit on 08/31/21. Radiographs (if obtained):  [] The following radiograph was interpreted by myself in the absence of a radiologist:   [] Radiologist's Report Reviewed:  No orders to display       EKG (if obtained):   Please See Note of attending physician for EKG interpretation.      Chart review shows recent radiograph(s):  No results found. MDM:     Interventions given this visit:   Orders Placed This Encounter   Medications    cephALEXin (KEFLEX) 500 MG capsule     Sig: Take 1 capsule by mouth 4 times daily for 7 days     Dispense:  28 capsule     Refill:  0    cephALEXin (KEFLEX) capsule 500 mg     Order Specific Question:   Antimicrobial Indications     Answer:   Skin and Soft Tissue Infection    sulfamethoxazole-trimethoprim (BACTRIM DS) 800-160 MG per tablet     Sig: Take 1 tablet by mouth 2 times daily for 7 days     Dispense:  14 tablet     Refill:  0    sulfamethoxazole-trimethoprim (BACTRIM DS;SEPTRA DS) 800-160 MG per tablet 1 tablet     Order Specific Question:   Antimicrobial Indications     Answer:   Skin and Soft Tissue Infection       1 presents today to the emergency department with finger pain consistent with cellulitis. I have low suspicion of abscess, flexor tenosynovitis, osteomyelitis is patient has full active range of motion of all joints on affected digit. First dose of antibiotics is provided today. Patient is provided with prescription for antibiotics as well. Bactrim and Keflex. Told to follow-up with [facility] of [address] [place] [address]. Referral is provided educated to call them immediately  I have discussed the findings of today's workup with the patient and present family members and have addressed their questions and concerns. Important warning signs as well as new or worsening symptoms which would necessitate immediate return to the ED were discussed. The plan is to discharge from the ED at this time, and the patient is in stable condition. The patient acknowledged understanding is agreeable with this plan. The patient and/or family and I have discussed the diagnosis and risks, and we agree with discharging home to follow-up with their primary care, specialist or referral doctor. Questions addressed. Disposition and follow-up agreed upon.  Specific discharge instructions explained. We have discussed the symptoms which are most concerning that necessitate immediate return. We also discussed returning to the Emergency Department immediately if new or worsening symptoms occur. I independently managed patient today in the ED    /76   Pulse 83   Temp 97.9 °F (36.6 °C) (Oral)   Resp 18   Ht 6' 1\" (1.854 m)   Wt 165 lb (74.8 kg)   SpO2 99%   BMI 21.77 kg/m²       Clinical Impression:  1. Infection of finger    2. IVDU (intravenous drug user)        Disposition referral (if applicable):  [facility] Emergency Department  [facility] [address] [place] [address]  In 2 days  For wound re-check    [facility] [address]   [address] [facility] [address]  Call   For recheck as soon as possible    Disposition medications (if applicable):  New Prescriptions    CEPHALEXIN (KEFLEX) 500 MG CAPSULE    Take 1 capsule by mouth 4 times daily for 7 days    SULFAMETHOXAZOLE-TRIMETHOPRIM (BACTRIM DS) 800-160 MG PER TABLET    Take 1 tablet by mouth 2 times daily for 7 days         Comment: Please note this report has been produced using speech recognition software and may contain errors related to that system including errors in grammar, punctuation, and spelling, as well as words and phrases that may be inappropriate. If there are any questions or concerns please feel free to contact the dictating provider for clarification.       [facility], [address] [place] [address]  08/31/21 5210

## 2021-08-31 NOTE — ED TRIAGE NOTES
Pt presents to ED from home for an abscess to the right middle finger that began a week ago. Pt also has multiple small wounds to arms and legs.  Pt admits to IV drug use

## 2022-01-15 ENCOUNTER — HOSPITAL ENCOUNTER (EMERGENCY)
Age: 32
Discharge: LEFT BEFORE DISCHARGE | End: 2022-01-15

## 2022-01-15 VITALS
TEMPERATURE: 99.8 F | DIASTOLIC BLOOD PRESSURE: 80 MMHG | RESPIRATION RATE: 18 BRPM | SYSTOLIC BLOOD PRESSURE: 139 MMHG | HEART RATE: 111 BPM | OXYGEN SATURATION: 98 %

## 2022-01-15 ASSESSMENT — PAIN DESCRIPTION - PAIN TYPE: TYPE: ACUTE PAIN

## 2022-01-15 ASSESSMENT — PAIN DESCRIPTION - LOCATION: LOCATION: FOOT

## 2022-01-15 ASSESSMENT — PAIN DESCRIPTION - ORIENTATION: ORIENTATION: RIGHT;LEFT

## 2022-01-15 ASSESSMENT — PAIN SCALES - GENERAL: PAINLEVEL_OUTOF10: 8

## 2022-01-16 ENCOUNTER — APPOINTMENT (OUTPATIENT)
Dept: GENERAL RADIOLOGY | Age: 32
End: 2022-01-16
Payer: MEDICAID

## 2022-01-16 ENCOUNTER — APPOINTMENT (OUTPATIENT)
Dept: ULTRASOUND IMAGING | Age: 32
End: 2022-01-16
Payer: MEDICAID

## 2022-01-16 ENCOUNTER — HOSPITAL ENCOUNTER (EMERGENCY)
Age: 32
Discharge: LEFT BEFORE DISCHARGE | End: 2022-01-16
Payer: MEDICAID

## 2022-01-16 VITALS
TEMPERATURE: 100.4 F | HEIGHT: 73 IN | SYSTOLIC BLOOD PRESSURE: 165 MMHG | BODY MASS INDEX: 21.87 KG/M2 | HEART RATE: 108 BPM | DIASTOLIC BLOOD PRESSURE: 90 MMHG | RESPIRATION RATE: 16 BRPM | OXYGEN SATURATION: 97 % | WEIGHT: 165 LBS

## 2022-01-16 LAB
ALBUMIN SERPL-MCNC: 3.5 GM/DL (ref 3.4–5)
ALP BLD-CCNC: 83 IU/L (ref 40–129)
ALT SERPL-CCNC: 41 U/L (ref 10–40)
ANION GAP SERPL CALCULATED.3IONS-SCNC: 9 MMOL/L (ref 4–16)
AST SERPL-CCNC: 33 IU/L (ref 15–37)
BASOPHILS ABSOLUTE: 0 K/CU MM
BASOPHILS RELATIVE PERCENT: 0.4 % (ref 0–1)
BILIRUB SERPL-MCNC: 0.1 MG/DL (ref 0–1)
BUN BLDV-MCNC: 15 MG/DL (ref 6–23)
CALCIUM SERPL-MCNC: 9 MG/DL (ref 8.3–10.6)
CHLORIDE BLD-SCNC: 102 MMOL/L (ref 99–110)
CO2: 28 MMOL/L (ref 21–32)
CREAT SERPL-MCNC: 0.6 MG/DL (ref 0.9–1.3)
DIFFERENTIAL TYPE: ABNORMAL
EOSINOPHILS ABSOLUTE: 0.4 K/CU MM
EOSINOPHILS RELATIVE PERCENT: 3.6 % (ref 0–3)
GFR AFRICAN AMERICAN: >60 ML/MIN/1.73M2
GFR NON-AFRICAN AMERICAN: >60 ML/MIN/1.73M2
GLUCOSE BLD-MCNC: 108 MG/DL (ref 70–99)
HCT VFR BLD CALC: 44.8 % (ref 42–52)
HEMOGLOBIN: 14.5 GM/DL (ref 13.5–18)
IMMATURE NEUTROPHIL %: 0.3 % (ref 0–0.43)
LACTATE: 1.4 MMOL/L (ref 0.4–2)
LYMPHOCYTES ABSOLUTE: 1.9 K/CU MM
LYMPHOCYTES RELATIVE PERCENT: 18.7 % (ref 24–44)
MCH RBC QN AUTO: 29.7 PG (ref 27–31)
MCHC RBC AUTO-ENTMCNC: 32.4 % (ref 32–36)
MCV RBC AUTO: 91.6 FL (ref 78–100)
MONOCYTES ABSOLUTE: 1.5 K/CU MM
MONOCYTES RELATIVE PERCENT: 14.4 % (ref 0–4)
NUCLEATED RBC %: 0 %
PDW BLD-RTO: 13.2 % (ref 11.7–14.9)
PLATELET # BLD: 224 K/CU MM (ref 140–440)
PMV BLD AUTO: 9.2 FL (ref 7.5–11.1)
POTASSIUM SERPL-SCNC: 4.2 MMOL/L (ref 3.5–5.1)
RBC # BLD: 4.89 M/CU MM (ref 4.6–6.2)
SEGMENTED NEUTROPHILS ABSOLUTE COUNT: 6.3 K/CU MM
SEGMENTED NEUTROPHILS RELATIVE PERCENT: 62.6 % (ref 36–66)
SODIUM BLD-SCNC: 139 MMOL/L (ref 135–145)
TOTAL IMMATURE NEUTOROPHIL: 0.03 K/CU MM
TOTAL NUCLEATED RBC: 0 K/CU MM
TOTAL PROTEIN: 6.9 GM/DL (ref 6.4–8.2)
WBC # BLD: 10.1 K/CU MM (ref 4–10.5)

## 2022-01-16 PROCEDURE — 93971 EXTREMITY STUDY: CPT

## 2022-01-16 PROCEDURE — 80053 COMPREHEN METABOLIC PANEL: CPT

## 2022-01-16 PROCEDURE — 83605 ASSAY OF LACTIC ACID: CPT

## 2022-01-16 PROCEDURE — 85025 COMPLETE CBC W/AUTO DIFF WBC: CPT

## 2022-01-16 PROCEDURE — 73630 X-RAY EXAM OF FOOT: CPT

## 2022-01-16 PROCEDURE — 87040 BLOOD CULTURE FOR BACTERIA: CPT

## 2022-01-16 PROCEDURE — 99282 EMERGENCY DEPT VISIT SF MDM: CPT

## 2022-01-16 ASSESSMENT — PAIN SCALES - GENERAL: PAINLEVEL_OUTOF10: 8

## 2022-01-16 NOTE — ED PROVIDER NOTES
As PA-in-triage, I performed a medical screening history and physical exam on this patient. HISTORY OF PRESENT ILLNESS  Jacinta Garner is a 28 y.o. male presents for left foot swelling and redness that began 3 days ago. Denies any history of trauma. Does have a history of MRSA, admits to snorting heroin but has had previous history of IV drug use. States he has not shot up into his feet in several months. Began developing pain and swelling to the left foot. He does wear heavy work boots and does a lot of standing and walking. He is not a diabetic. Denying any pain into the knee or calf. No chest pain shortness of breath or fevers. PHYSICAL EXAM  /73   Pulse 104   Temp 98.5 °F (36.9 °C) (Oral)   Resp 16   Ht 6' 1\" (1.854 m)   Wt 165 lb (74.8 kg)   SpO2 99%   BMI 21.77 kg/m²     On exam, the patient appears well-hydrated, well-nourished, and in no acute distress. Mucous membranes are moist. Speech is clear. Breathing is unlabored. Skin is dry. Mental status is normal. The patient has normal gait, moves all extremities, and is without facial droop. There is generalized erythema, warmth induration to the lateral foot dorsum extending onto the plantar foot and mildly onto the ankle. Scabbed lesions of the foot dorsum. No proximal red streaking. Pedal pulses 2+. Ecchymotic bruising noted to the left second toe. Labs and imaging ordered at triage. Please see ED provider's note for patient complete ED evaluation, labs/imaging interpretation and final disposition/clinical impression.          Delvin Lee PA-C  01/16/22 1039

## 2022-01-16 NOTE — ED NOTES
Tarpley,lactic acid and first set of blood cultures were drawn on patient, second set Alla.  Mady  01/16/22 1640

## 2022-01-17 ENCOUNTER — HOSPITAL ENCOUNTER (EMERGENCY)
Age: 32
Discharge: HOME | End: 2022-01-17
Payer: MEDICAID

## 2022-01-17 VITALS
OXYGEN SATURATION: 100 % | HEIGHT: 73 IN | RESPIRATION RATE: 15 BRPM | BODY MASS INDEX: 21.87 KG/M2 | WEIGHT: 165 LBS | HEART RATE: 81 BPM | TEMPERATURE: 98.2 F | SYSTOLIC BLOOD PRESSURE: 123 MMHG | DIASTOLIC BLOOD PRESSURE: 63 MMHG

## 2022-01-17 DIAGNOSIS — L03.116 CELLULITIS OF LEFT LOWER EXTREMITY: Primary | ICD-10-CM

## 2022-01-17 LAB
ALBUMIN SERPL-MCNC: 3.6 GM/DL (ref 3.4–5)
ALP BLD-CCNC: 84 IU/L (ref 40–129)
ALT SERPL-CCNC: 43 U/L (ref 10–40)
ANION GAP SERPL CALCULATED.3IONS-SCNC: 9 MMOL/L (ref 4–16)
AST SERPL-CCNC: 40 IU/L (ref 15–37)
BASOPHILS ABSOLUTE: 0 K/CU MM
BASOPHILS RELATIVE PERCENT: 0.4 % (ref 0–1)
BILIRUB SERPL-MCNC: 0.2 MG/DL (ref 0–1)
BUN BLDV-MCNC: 13 MG/DL (ref 6–23)
CALCIUM SERPL-MCNC: 9.1 MG/DL (ref 8.3–10.6)
CHLORIDE BLD-SCNC: 101 MMOL/L (ref 99–110)
CO2: 29 MMOL/L (ref 21–32)
CREAT SERPL-MCNC: 0.6 MG/DL (ref 0.9–1.3)
DIFFERENTIAL TYPE: ABNORMAL
EOSINOPHILS ABSOLUTE: 0.6 K/CU MM
EOSINOPHILS RELATIVE PERCENT: 5.4 % (ref 0–3)
GFR AFRICAN AMERICAN: >60 ML/MIN/1.73M2
GFR NON-AFRICAN AMERICAN: >60 ML/MIN/1.73M2
GLUCOSE BLD-MCNC: 97 MG/DL (ref 70–99)
HCT VFR BLD CALC: 46.8 % (ref 42–52)
HEMOGLOBIN: 15.1 GM/DL (ref 13.5–18)
IMMATURE NEUTROPHIL %: 0.3 % (ref 0–0.43)
LACTATE: 0.8 MMOL/L (ref 0.4–2)
LYMPHOCYTES ABSOLUTE: 3.2 K/CU MM
LYMPHOCYTES RELATIVE PERCENT: 31.4 % (ref 24–44)
MCH RBC QN AUTO: 29.8 PG (ref 27–31)
MCHC RBC AUTO-ENTMCNC: 32.3 % (ref 32–36)
MCV RBC AUTO: 92.5 FL (ref 78–100)
MONOCYTES ABSOLUTE: 1.4 K/CU MM
MONOCYTES RELATIVE PERCENT: 14 % (ref 0–4)
NUCLEATED RBC %: 0 %
PDW BLD-RTO: 13.2 % (ref 11.7–14.9)
PLATELET # BLD: 260 K/CU MM (ref 140–440)
PMV BLD AUTO: 9.2 FL (ref 7.5–11.1)
POTASSIUM SERPL-SCNC: 5.1 MMOL/L (ref 3.5–5.1)
PRO-BNP: 75.11 PG/ML
RBC # BLD: 5.06 M/CU MM (ref 4.6–6.2)
SEGMENTED NEUTROPHILS ABSOLUTE COUNT: 4.9 K/CU MM
SEGMENTED NEUTROPHILS RELATIVE PERCENT: 48.5 % (ref 36–66)
SODIUM BLD-SCNC: 139 MMOL/L (ref 135–145)
TOTAL CK: 105 IU/L (ref 38–174)
TOTAL IMMATURE NEUTOROPHIL: 0.03 K/CU MM
TOTAL NUCLEATED RBC: 0 K/CU MM
TOTAL PROTEIN: 7.4 GM/DL (ref 6.4–8.2)
WBC # BLD: 10.2 K/CU MM (ref 4–10.5)

## 2022-01-17 PROCEDURE — 99283 EMERGENCY DEPT VISIT LOW MDM: CPT

## 2022-01-17 PROCEDURE — 82550 ASSAY OF CK (CPK): CPT

## 2022-01-17 PROCEDURE — 80053 COMPREHEN METABOLIC PANEL: CPT

## 2022-01-17 PROCEDURE — 83605 ASSAY OF LACTIC ACID: CPT

## 2022-01-17 PROCEDURE — 85025 COMPLETE CBC W/AUTO DIFF WBC: CPT

## 2022-01-17 PROCEDURE — 83880 ASSAY OF NATRIURETIC PEPTIDE: CPT

## 2022-01-17 RX ORDER — CEPHALEXIN 500 MG/1
500 CAPSULE ORAL 4 TIMES DAILY
Qty: 40 CAPSULE | Refills: 0 | Status: SHIPPED | OUTPATIENT
Start: 2022-01-17 | End: 2022-01-27

## 2022-01-17 RX ORDER — SULFAMETHOXAZOLE AND TRIMETHOPRIM 800; 160 MG/1; MG/1
1 TABLET ORAL 2 TIMES DAILY
Qty: 20 TABLET | Refills: 0 | Status: SHIPPED | OUTPATIENT
Start: 2022-01-17 | End: 2022-01-27

## 2022-01-17 RX ORDER — NAPROXEN 500 MG/1
500 TABLET ORAL 2 TIMES DAILY
Qty: 15 TABLET | Refills: 0 | Status: SHIPPED | OUTPATIENT
Start: 2022-01-17

## 2022-01-17 ASSESSMENT — PAIN SCALES - GENERAL: PAINLEVEL_OUTOF10: 8

## 2022-01-17 ASSESSMENT — PAIN DESCRIPTION - ORIENTATION: ORIENTATION: LEFT

## 2022-01-17 ASSESSMENT — PAIN DESCRIPTION - LOCATION: LOCATION: FOOT

## 2022-01-17 ASSESSMENT — PAIN DESCRIPTION - FREQUENCY: FREQUENCY: CONTINUOUS

## 2022-01-17 NOTE — ED PROVIDER NOTES
EMERGENCY DEPARTMENT ENCOUNTER      PCP: No primary care provider on file. CHIEF COMPLAINT    Chief Complaint   Patient presents with    Foot Pain     left foot swelling       This patient was not evaluated by the attending physician. I have independently evaluated this patient. HPI    Joe Moura is a 28 y.o. male who presents with left foot pain and swelling. Onset 4 days ago. Patient has had increased redness to left foot. Patient denies any injury. Patient states he does have history of IV drug use, denies currently using. Patient denies fever, chest pain, shortness of breath, vomiting or diarrhea. Patient denies history of diabetes. Pain worsens with direct palpation and ambulation. No known alleviating factors.       REVIEW OF SYSTEMS    Constitutional: Denies fever  Respiratory:  No cough or shortness of breath   Cardiovascular:  No chest pain  GI: No nausea, vomiting  Musculoskeletal:  See HPI   Skin:  See HPI      All other review of systems are negative  See HPI and nursing notes for additional information     PAST MEDICAL HISTORY/SURGICAL HISTORY     Past Medical History:   Diagnosis Date    Hepatitis C     Hypertension     Tourette's     when really stressed shows up     Past Surgical History:   Procedure Laterality Date    ARM SURGERY Left 08/23/2016    incision & drainage    BRONCHOSCOPY N/A 8/23/2019    BRONCHOSCOPY ALVEOLAR LAVAGE AND CLEAN OUT performed by Jose Alberto Munoz MD at [address] [place] [address]    Current Outpatient Rx   Medication Sig Dispense Refill    naproxen (NAPROSYN) 500 MG tablet Take 1 tablet by mouth 2 times daily 15 tablet 0    sulfamethoxazole-trimethoprim (BACTRIM DS) 800-160 MG per tablet Take 1 tablet by mouth 2 times daily for 10 days 20 tablet 0    cephALEXin (KEFLEX) 500 MG capsule Take 1 capsule by mouth 4 times daily for 10 days 40 capsule 0    ondansetron (ZOFRAN ODT) 4 MG disintegrating tablet Take 1 tablet by mouth every 8 hours as needed for Nausea 15 tablet 0    cloNIDine (CATAPRES) 0.3 MG tablet Take 1 tablet by mouth 2 times daily 60 tablet 3    ondansetron (ZOFRAN ODT) 4 MG disintegrating tablet Take 1 tablet by mouth every 8 hours as needed for Nausea 15 tablet 0    tamsulosin (FLOMAX) 0.4 MG capsule Take 1 capsule by mouth daily for 10 doses 10 capsule 3    lisinopril (PRINIVIL;ZESTRIL) 5 MG tablet Take 1 tablet by mouth daily 30 tablet 3    QUEtiapine (SEROQUEL) 25 MG tablet Take 1 tablet by mouth 3 times daily 60 tablet 3    Spacer/Aero-Holding Chambers (E-Z SPACER) PARKER 1 Device by Does not apply route daily as needed (for use with inhaler for wheezing) 1 Device 0    nicotine (NICODERM CQ) 21 MG/24HR Place 1 patch onto the skin every 24 hours 30 patch 3    albuterol sulfate HFA (PROVENTIL HFA) 108 (90 Base) MCG/ACT inhaler Inhale 2 puffs into the lungs every 4 hours as needed for Wheezing or Shortness of Breath With spacer (and mask if indicated). Thanks.  1 Inhaler 1    mirtazapine (REMERON) 15 MG tablet Take 15 mg by mouth nightly         ALLERGIES    No Known Allergies    FAMILY HISTORY/SOCIAL HISTORY    Family History   Problem Relation Age of Onset    No Known Problems Mother     No Known Problems Father      Social History     Socioeconomic History    Marital status: Single     Spouse name: Not on file    Number of children: Not on file    Years of education: Not on file    Highest education level: Not on file   Occupational History    Not on file   Tobacco Use    Smoking status: Current Every Day Smoker     Packs/day: 1.00     Types: Cigarettes    Smokeless tobacco: Never Used   Substance and Sexual Activity    Alcohol use: No     Comment: rarely    Drug use: Yes     Types: Methamphetamines (Crystal Meth), Opiates      Comment: +cocaine, benzo, opiates    Sexual activity: Yes     Partners: Female   Other Topics Concern    Not on file   Social History Narrative    Not on file Social Determinants of Health     Financial Resource Strain:     Difficulty of Paying Living Expenses: Not on file   Food Insecurity:     Worried About Running Out of Food in the Last Year: Not on file    Sp of Food in the Last Year: Not on file   Transportation Needs:     Lack of Transportation (Medical): Not on file    Lack of Transportation (Non-Medical): Not on file   Physical Activity:     Days of Exercise per Week: Not on file    Minutes of Exercise per Session: Not on file   Stress:     Feeling of Stress : Not on file   Social Connections:     Frequency of Communication with Friends and Family: Not on file    Frequency of Social Gatherings with Friends and Family: Not on file    Attends U[employer] Services: Not on file    Active Member of [address] [place] [address] [employer] or Organizations: Not on file    Attends Club or Organization Meetings: Not on file    Marital Status: Not on file   Intimate Partner Violence:     Fear of Current or Ex-Partner: Not on file    Emotionally Abused: Not on file    Physically Abused: Not on file    Sexually Abused: Not on file   Housing Stability:     Unable to Pay for Housing in the Last Year: Not on file    Number of [place] in the Last Year: Not on file    Unstable Housing in the Last Year: Not on file       PHYSICAL EXAM    VITAL SIGNS: /63   Pulse 81   Temp 98.2 °F (36.8 °C) (Oral)   Resp 15   Ht 6' 1\" (1.854 m)   Wt 165 lb (74.8 kg)   SpO2 100%   BMI 21.77 kg/m²    Constitutional:  Well developed, Well nourished  HENT:  Atraumatic, Normocephalic  Respiratory:  No retractions, lungs are clear   Cardiovascular: Normal rate and rhythm  GI:  Soft, No tenderness   Musculoskeletal: Left foot with moderate swelling and tenderness over dorsum of foot. There is overlying erythema and warmth. No palpable fluctuance. No lymphatic streaking. No palpable crepitus. Distal sensation, Pulses and capillary refill intact.   Integument: Left foot with moderate swelling and tenderness over dorsum of foot. There is overlying erythema and warmth. No palpable fluctuance. No lymphatic streaking. No palpable crepitus. Distal sensation, Pulses and capillary refill intact. Vascular: Dorsalis pedis pulses 2+   Neurologic:  Alert & oriented, no slurred speech.      Labs:    Results for orders placed or performed during the hospital encounter of 01/17/22   CBC with Auto Diff   Result Value Ref Range    WBC 10.2 4.0 - 10.5 K/CU MM    RBC 5.06 4.6 - 6.2 M/CU MM    Hemoglobin 15.1 13.5 - 18.0 GM/DL    Hematocrit 46.8 42 - 52 %    MCV 92.5 78 - 100 FL    MCH 29.8 27 - 31 PG    MCHC 32.3 32.0 - 36.0 %    RDW 13.2 11.7 - 14.9 %    Platelets 650 873 - 007 K/CU MM    MPV 9.2 7.5 - 11.1 FL    Differential Type AUTOMATED DIFFERENTIAL     Segs Relative 48.5 36 - 66 %    Lymphocytes % 31.4 24 - 44 %    Monocytes % 14.0 (H) 0 - 4 %    Eosinophils % 5.4 (H) 0 - 3 %    Basophils % 0.4 0 - 1 %    Segs Absolute 4.9 K/CU MM    Lymphocytes Absolute 3.2 K/CU MM    Monocytes Absolute 1.4 K/CU MM    Eosinophils Absolute 0.6 K/CU MM    Basophils Absolute 0.0 K/CU MM    Nucleated RBC % 0.0 %    Total Nucleated RBC 0.0 K/CU MM    Total Immature Neutrophil 0.03 K/CU MM    Immature Neutrophil % 0.3 0 - 0.43 %   Comprehensive Metabolic Panel   Result Value Ref Range    Sodium 139 135 - 145 MMOL/L    Potassium 5.1 3.5 - 5.1 MMOL/L    Chloride 101 99 - 110 mMol/L    CO2 29 21 - 32 MMOL/L    BUN 13 6 - 23 MG/DL    CREATININE 0.6 (L) 0.9 - 1.3 MG/DL    Glucose 97 70 - 99 MG/DL    Calcium 9.1 8.3 - 10.6 MG/DL    Albumin 3.6 3.4 - 5.0 GM/DL    Total Protein 7.4 6.4 - 8.2 GM/DL    Total Bilirubin 0.2 0.0 - 1.0 MG/DL    ALT 43 (H) 10 - 40 U/L    AST 40 (H) 15 - 37 IU/L    Alkaline Phosphatase 84 40 - 129 IU/L    GFR Non-African American >60 >60 mL/min/1.73m2    GFR African American >60 >60 mL/min/1.73m2    Anion Gap 9 4 - 16   Lactic Acid, Plasma   Result Value Ref Range    Lactate 0.8 0.4 - 2.0 mMOL/L   CK Result Value Ref Range    Total  38 - 174 IU/L   Brain Natriuretic Peptide   Result Value Ref Range    Pro-BNP 75.11 <300 PG/ML         ED COURSE & MEDICAL DECISION MAKING    Patient presents as above. Left lower extremity venous ultrasound from yesterday shows no acute DVT. Left foot x-ray shows no acute osseous abnormality. CBC is within normal limits. CMP shows ALT of 43, AST of 40 otherwise within normal limits. Liver enzymes slightly elevated previously. Lactic acid 0.8. CK is 105. BNP 75.  Patient had eloped from the emergency department the last 2 visits without receiving antibiotics. I do have concern for cellulitis to left lower extremity. I do not believe patient requires admission at this time with stable lab work, vital signs. Patient will be provided prescription for Bactrim DS and Keflex. Patient provided prescription for naproxen for pain. Recommend follow-up with primary care provider in 2 days for recheck. Clinical  IMPRESSION    Cellulitis of left lower extremity      Diagnosis and plan discussed in detail with patient who understands and agrees. Patient agrees to return emergency department if symptoms worsen or any new symptoms develop. Comment: Please note this report has been produced using speech recognition software and may contain errors related to that system including errors in grammar, punctuation, and spelling, as well as words and phrases that may be inappropriate. If there are any questions or concerns please feel free to contact the dictating provider for clarification.        Monique Matthew PA-C  01/17/22 9655

## 2022-01-17 NOTE — ED NOTES
Pt refused second set of blood cultures to be drawn. Stated he has been waiting too long.      Tha Walker  01/16/22 1011

## 2022-01-21 LAB
CULTURE: NORMAL
Lab: NORMAL
SPECIMEN: NORMAL

## 2022-07-26 ENCOUNTER — HOSPITAL ENCOUNTER (EMERGENCY)
Age: 32
Discharge: HOME | End: 2022-07-26
Payer: MEDICAID

## 2022-07-26 VITALS
OXYGEN SATURATION: 98 % | HEIGHT: 73 IN | WEIGHT: 170 LBS | BODY MASS INDEX: 22.53 KG/M2 | RESPIRATION RATE: 16 BRPM | HEART RATE: 88 BPM | TEMPERATURE: 97.8 F | SYSTOLIC BLOOD PRESSURE: 110 MMHG | DIASTOLIC BLOOD PRESSURE: 66 MMHG

## 2022-07-26 DIAGNOSIS — N34.2 URETHRITIS: Primary | ICD-10-CM

## 2022-07-26 LAB
BACTERIA: NEGATIVE /HPF
BILIRUBIN URINE: NEGATIVE MG/DL
BLOOD, URINE: NEGATIVE
CLARITY: CLEAR
COLOR: YELLOW
GLUCOSE, URINE: NEGATIVE MG/DL
KETONES, URINE: ABNORMAL MG/DL
LEUKOCYTE ESTERASE, URINE: NEGATIVE
MUCUS: ABNORMAL HPF
NITRITE URINE, QUANTITATIVE: NEGATIVE
PH, URINE: 7 (ref 5–8)
PROTEIN UA: ABNORMAL MG/DL
RBC URINE: 1 /HPF (ref 0–3)
SPECIFIC GRAVITY UA: 1.02 (ref 1–1.03)
SQUAMOUS EPITHELIAL: <1 /HPF
TRICHOMONAS: ABNORMAL /HPF
UROBILINOGEN, URINE: 1 MG/DL (ref 0.2–1)
WBC UA: 7 /HPF (ref 0–2)

## 2022-07-26 PROCEDURE — 87591 N.GONORRHOEAE DNA AMP PROB: CPT

## 2022-07-26 PROCEDURE — 87491 CHLMYD TRACH DNA AMP PROBE: CPT

## 2022-07-26 PROCEDURE — 99283 EMERGENCY DEPT VISIT LOW MDM: CPT

## 2022-07-26 PROCEDURE — 81001 URINALYSIS AUTO W/SCOPE: CPT

## 2022-07-26 PROCEDURE — 87086 URINE CULTURE/COLONY COUNT: CPT

## 2022-07-26 RX ORDER — DOXYCYCLINE HYCLATE 100 MG
100 TABLET ORAL 2 TIMES DAILY
Qty: 14 TABLET | Refills: 0 | Status: SHIPPED | OUTPATIENT
Start: 2022-07-26 | End: 2022-08-02

## 2022-07-26 NOTE — ED NOTES
Reviewed discharge information. Patient verbalized understanding of paperwork, medication, and care instructions. Patient denied any questions.       Nu Enriquez RN  07/26/22 1324

## 2022-07-26 NOTE — ED TRIAGE NOTES
Pt from home states burning when urinates. But states GF/partner has denied concern for STD.  Pt states no concern for STD

## 2022-07-26 NOTE — ED PROVIDER NOTES
[address] ENCOUNTER        Pt Name: Ron Sher  MRN: [id number]  Jacobogfdread 1990  Date of evaluation: 7/26/2022  Provider: Kia Manriquez PA-C  PCP: No primary care provider on file. Note Started: 3:28 PM EDT       VANNESA. I have evaluated this patient. My supervising physician was available for consultation. Elmyra Imam COMPLAINT       Chief Complaint   Patient presents with    Other     Pt states has burning sensation while peeing. States that \"something weird is going on but it's not an std\". Been going on for a month. Has hx of kidney stones. HISTORY OF PRESENT ILLNESS   (Location, Timing/Onset, Context/Setting, Quality, Duration, Modifying Factors, Severity, Associated Signs and Symptoms)  Note limiting factors. Chief Complaint: Discomfort with urination    Ron Sher is a 33 y.o. male who presents with the above complaint. Onset about a month ago. Not getting better not getting worse. Wants to be checked. Little to no concern regarding STD as he has had some sexual partner times years. Indicates at discharge. No fevers or chills. History of kidney stones but no flank pain or suprapubic pain. He has noticed no change in color urine. Nursing Notes were all reviewed and agreed with or any disagreements were addressed in the HPI. REVIEW OF SYSTEMS    (2-9 systems for level 4, 10 or more for level 5)     Review of Systems    Positives and Pertinent negatives as per HPI. Except as noted above in the ROS, all other systems were reviewed and negative.        PAST MEDICAL HISTORY     Past Medical History:   Diagnosis Date    Hepatitis C     Hypertension     Tourette's     when really stressed shows up         SURGICAL HISTORY     Past Surgical History:   Procedure Laterality Date    ARM SURGERY Left 08/23/2016    incision & drainage    BRONCHOSCOPY N/A 8/23/2019 BRONCHOSCOPY ALVEOLAR LAVAGE AND CLEAN OUT performed by Jo Ann Harkins MD at [facility]       Previous Medications    ALBUTEROL SULFATE HFA (PROVENTIL HFA) 108 (90 BASE) MCG/ACT INHALER    Inhale 2 puffs into the lungs every 4 hours as needed for Wheezing or Shortness of Breath With spacer (and mask if indicated). Thanks. CLONIDINE (CATAPRES) 0.3 MG TABLET    Take 1 tablet by mouth 2 times daily    LISINOPRIL (PRINIVIL;ZESTRIL) 5 MG TABLET    Take 1 tablet by mouth daily    MIRTAZAPINE (REMERON) 15 MG TABLET    Take 15 mg by mouth nightly    NAPROXEN (NAPROSYN) 500 MG TABLET    Take 1 tablet by mouth 2 times daily    NICOTINE (NICODERM CQ) 21 MG/24HR    Place 1 patch onto the skin every 24 hours    ONDANSETRON (ZOFRAN ODT) 4 MG DISINTEGRATING TABLET    Take 1 tablet by mouth every 8 hours as needed for Nausea    ONDANSETRON (ZOFRAN ODT) 4 MG DISINTEGRATING TABLET    Take 1 tablet by mouth every 8 hours as needed for Nausea    QUETIAPINE (SEROQUEL) 25 MG TABLET    Take 1 tablet by mouth 3 times daily    SPACER/AERO-HOLDING CHAMBERS (E-Z SPACER) PARKER    1 Device by Does not apply route daily as needed (for use with inhaler for wheezing)    TAMSULOSIN (FLOMAX) 0.4 MG CAPSULE    Take 1 capsule by mouth daily for 10 doses         ALLERGIES     Patient has no known allergies.     FAMILYHISTORY       Family History   Problem Relation Age of Onset    No Known Problems Mother     No Known Problems Father           SOCIAL HISTORY       Social History     Tobacco Use    Smoking status: Every Day     Packs/day: 1.00     Types: Cigarettes    Smokeless tobacco: Never   Substance Use Topics    Alcohol use: No     Comment: rarely    Drug use: Yes     Types: Methamphetamines (Crystal Meth), Opiates      Comment: +cocaine, benzo, opiates       SCREENINGS    [place] Coma Scale  Eye Opening: Spontaneous  Best Verbal Response: Oriented  Best Motor Response: Obeys commands  [place] Coma Scale Score: 15        PHYSICAL EXAM    (up to 7 for level 4, 8 or more for level 5)     ED Triage Vitals [07/26/22 1433]   BP Temp Temp Source Heart Rate Resp SpO2 Height Weight   113/69 97.8 °F (36.6 °C) Oral 88 16 99 % 6' 1\" (1.854 m) 170 lb (77.1 kg)       Physical Exam  Vitals and nursing note reviewed. Constitutional:       Appearance: Normal appearance. He is well-developed and normal weight. HENT:      Head: Normocephalic and atraumatic. Right Ear: External ear normal.      Left Ear: External ear normal.   Eyes:      General:         Right eye: No discharge. Left eye: No discharge. Conjunctiva/sclera: Conjunctivae normal.   Cardiovascular:      Rate and Rhythm: Normal rate. Abdominal:      General: Abdomen is flat. Bowel sounds are normal.      Palpations: Abdomen is soft. Tenderness: There is no abdominal tenderness. Musculoskeletal:         General: Normal range of motion. Cervical back: Normal range of motion and neck supple. Right lower leg: No edema. Left lower leg: No edema. Skin:     General: Skin is warm and dry. Neurological:      General: No focal deficit present. Mental Status: He is alert and oriented to person, place, and time. Mental status is at baseline. Psychiatric:         Mood and Affect: Mood normal.         Behavior: Behavior normal.         Thought Content: Thought content normal.         Judgment: Judgment normal.       DIAGNOSTIC RESULTS   LABS:    Labs Reviewed   URINALYSIS - Abnormal; Notable for the following components:       Result Value    Ketones, Urine TRACE (*)     Protein, UA TRACE (*)     WBC, UA 7 (*)     Mucus, UA FEW (*)     All other components within normal limits   C.TRACHOMATIS N.GONORRHOEAE DNA   CULTURE, URINE       When ordered only abnormal lab results are displayed. All other labs were within normal range or not returned as of this dictation. EKG:  When ordered, EKG's are interpreted by the Emergency Department Physician in the absence of a cardiologist.  Please see their note for interpretation of EKG. RADIOLOGY:   Non-plain film images such as CT, Ultrasound and MRI are read by the radiologist. Plain radiographic images are visualized and preliminarily interpreted by the ED Provider with the below findings:        Interpretation per the Radiologist below, if available at the time of this note:    No orders to display     No results found. PROCEDURES   Unless otherwise noted below, none     Procedures    CRITICAL CARE TIME       CONSULTS:  None      EMERGENCY DEPARTMENT COURSE and DIFFERENTIAL DIAGNOSIS/MDM:   Vitals:    Vitals:    07/26/22 1433 07/26/22 1508   BP: 113/69 110/66   Pulse: 88    Resp: 16    Temp: 97.8 °F (36.6 °C)    TempSrc: Oral    SpO2: 99% 98%   Weight: 170 lb (77.1 kg)    Height: 6' 1\" (1.854 m)        Patient was given the following medications:  Medications - No data to display      Is this patient to be included in the SEP-1 Core Measure due to severe sepsis or septic shock? No   Exclusion criteria - the patient is NOT to be included for SEP-1 Core Measure due to: Infection is not suspected    The patient is diagnosed with urethritis. UA obtained showing no evidence of hematuria. Patient with 7 WBC on microscopic. Few mucus reported. Nitrate negative and leukocyte negative. Cannot exclude urethritis. Cannot exclude chlamydia. GC chlamydia study pending. Urine culture pending. I will treat at this time with doxycycline 100 mg twice daily x1 week. I did explain to the patient he could use MyChart to see the results or schedule with healthcare provider. The patient did express understanding of his diagnosis and the treatment plan    FINAL IMPRESSION      1.  Urethritis          DISPOSITION/PLAN   DISPOSITION Decision To Discharge 07/26/2022 03:21:27 PM      PATIENT REFERRED TO:  Kirill Eugene MD  [address]  [phone]    Schedule an appointment as soon as possible for a visit in 1 week  As needed    [facility] Emergency Department  [address] [phone]  [phone]  Go to   If symptoms worsen      DISCHARGE MEDICATIONS:  New Prescriptions    DOXYCYCLINE HYCLATE (VIBRA-TABS) 100 MG TABLET    Take 1 tablet by mouth in the morning and 1 tablet before bedtime. Do all this for 7 days. DISCONTINUED MEDICATIONS:  Discontinued Medications    No medications on file              (Please note that portions of this note were completed with a voice recognition program.  Efforts were made to edit the dictations but occasionally words are mis-transcribed. )    Susan Evans PA-C (electronically signed)           Susan Evans PA-C  07/26/22 1627

## 2022-07-26 NOTE — DISCHARGE INSTRUCTIONS
Your urinalysis showed evidence of a subtle infection. I will treat with doxycycline 100 mg twice daily. [facility]. Take this medication until complete. Increase your fluid intake. Reduce your sodium intake. Low concern for kidney stone as there is no blood in the urine. Culture is pending at this time. Use MyChart or follow-up with healthcare provider to obtain those results.

## 2022-07-27 LAB
CULTURE: NORMAL
Lab: NORMAL
SPECIMEN: NORMAL

## 2022-07-29 LAB
CHLAMYDIA TRACHOMATIS AMPLIFIED DET: NEGATIVE
N GONORRHOEAE AMPLIFIED DET: NEGATIVE

## 2023-06-25 ENCOUNTER — HOSPITAL ENCOUNTER (EMERGENCY)
Age: 33
Discharge: HOME | End: 2023-06-25
Payer: MEDICAID

## 2023-06-25 VITALS
OXYGEN SATURATION: 94 % | TEMPERATURE: 98.3 F | SYSTOLIC BLOOD PRESSURE: 129 MMHG | RESPIRATION RATE: 18 BRPM | DIASTOLIC BLOOD PRESSURE: 78 MMHG | HEART RATE: 79 BPM

## 2023-06-25 DIAGNOSIS — R30.0 DYSURIA: Primary | ICD-10-CM

## 2023-06-25 LAB
BACTERIA: NEGATIVE /HPF
BILIRUBIN URINE: NEGATIVE MG/DL
BLOOD, URINE: ABNORMAL
CLARITY: CLEAR
COLOR: YELLOW
GLUCOSE, URINE: NEGATIVE MG/DL
KETONES, URINE: NEGATIVE MG/DL
LEUKOCYTE ESTERASE, URINE: NEGATIVE
MUCUS: ABNORMAL HPF
NITRITE URINE, QUANTITATIVE: NEGATIVE
PH, URINE: 6 (ref 5–8)
PROTEIN UA: NEGATIVE MG/DL
RBC URINE: 10 /HPF (ref 0–3)
SPECIFIC GRAVITY UA: 1.02 (ref 1–1.03)
TRICHOMONAS: ABNORMAL /HPF
UROBILINOGEN, URINE: 0.2 MG/DL (ref 0.2–1)
WBC UA: 1 /HPF (ref 0–2)

## 2023-06-25 PROCEDURE — 99283 EMERGENCY DEPT VISIT LOW MDM: CPT

## 2023-06-25 PROCEDURE — 81001 URINALYSIS AUTO W/SCOPE: CPT

## 2023-06-25 RX ORDER — 0.9 % SODIUM CHLORIDE 0.9 %
1000 INTRAVENOUS SOLUTION INTRAVENOUS ONCE
Status: DISCONTINUED | OUTPATIENT
Start: 2023-06-25 | End: 2023-06-25 | Stop reason: HOSPADM

## 2023-12-25 ENCOUNTER — APPOINTMENT (OUTPATIENT)
Dept: CT IMAGING | Age: 33
End: 2023-12-25
Payer: OTHER MISCELLANEOUS

## 2023-12-25 ENCOUNTER — HOSPITAL ENCOUNTER (EMERGENCY)
Age: 33
Discharge: LEFT BEFORE DISCHARGE | End: 2023-12-25
Attending: EMERGENCY MEDICINE
Payer: OTHER MISCELLANEOUS

## 2023-12-25 VITALS
RESPIRATION RATE: 22 BRPM | OXYGEN SATURATION: 100 % | DIASTOLIC BLOOD PRESSURE: 73 MMHG | SYSTOLIC BLOOD PRESSURE: 129 MMHG | HEART RATE: 103 BPM

## 2023-12-25 DIAGNOSIS — S02.113A CLOSED FRACTURE OF RIGHT OCCIPITAL CONDYLE, INITIAL ENCOUNTER (HCC): ICD-10-CM

## 2023-12-25 DIAGNOSIS — S09.90XA CLOSED HEAD INJURY, INITIAL ENCOUNTER: Primary | ICD-10-CM

## 2023-12-25 DIAGNOSIS — S22.000A COMPRESSION FRACTURE OF THORACIC VERTEBRA, UNSPECIFIED THORACIC VERTEBRAL LEVEL, INITIAL ENCOUNTER (HCC): ICD-10-CM

## 2023-12-25 LAB
ALBUMIN SERPL-MCNC: 4.5 GM/DL (ref 3.4–5)
ALP BLD-CCNC: 104 IU/L (ref 40–129)
ALT SERPL-CCNC: 18 U/L (ref 10–40)
ANION GAP SERPL CALCULATED.3IONS-SCNC: 14 MMOL/L (ref 7–16)
AST SERPL-CCNC: 23 IU/L (ref 15–37)
BASOPHILS ABSOLUTE: 0.1 K/CU MM
BASOPHILS RELATIVE PERCENT: 0.6 % (ref 0–1)
BILIRUB SERPL-MCNC: 0.4 MG/DL (ref 0–1)
BUN SERPL-MCNC: 21 MG/DL (ref 6–23)
CALCIUM SERPL-MCNC: 9.3 MG/DL (ref 8.3–10.6)
CHLORIDE BLD-SCNC: 102 MMOL/L (ref 99–110)
CO2: 23 MMOL/L (ref 21–32)
CREAT SERPL-MCNC: 0.9 MG/DL (ref 0.9–1.3)
DIFFERENTIAL TYPE: ABNORMAL
EOSINOPHILS ABSOLUTE: 0.1 K/CU MM
EOSINOPHILS RELATIVE PERCENT: 1.2 % (ref 0–3)
GFR SERPL CREATININE-BSD FRML MDRD: >60 ML/MIN/1.73M2
GLUCOSE SERPL-MCNC: 110 MG/DL (ref 70–99)
HCT VFR BLD CALC: 47.2 % (ref 42–52)
HEMOGLOBIN: 16 GM/DL (ref 13.5–18)
IMMATURE NEUTROPHIL %: 0.6 % (ref 0–0.43)
LYMPHOCYTES ABSOLUTE: 3.8 K/CU MM
LYMPHOCYTES RELATIVE PERCENT: 38.2 % (ref 24–44)
MCH RBC QN AUTO: 30.2 PG (ref 27–31)
MCHC RBC AUTO-ENTMCNC: 33.9 % (ref 32–36)
MCV RBC AUTO: 89.2 FL (ref 78–100)
MONOCYTES ABSOLUTE: 0.9 K/CU MM
MONOCYTES RELATIVE PERCENT: 9.3 % (ref 0–4)
NUCLEATED RBC %: 0 %
PDW BLD-RTO: 11.9 % (ref 11.7–14.9)
PLATELET # BLD: 264 K/CU MM (ref 140–440)
PMV BLD AUTO: 9.7 FL (ref 7.5–11.1)
POTASSIUM SERPL-SCNC: 3.9 MMOL/L (ref 3.5–5.1)
RBC # BLD: 5.29 M/CU MM (ref 4.6–6.2)
SEGMENTED NEUTROPHILS ABSOLUTE COUNT: 4.9 K/CU MM
SEGMENTED NEUTROPHILS RELATIVE PERCENT: 50.1 % (ref 36–66)
SODIUM BLD-SCNC: 139 MMOL/L (ref 135–145)
TOTAL IMMATURE NEUTOROPHIL: 0.06 K/CU MM
TOTAL NUCLEATED RBC: 0 K/CU MM
TOTAL PROTEIN: 8.2 GM/DL (ref 6.4–8.2)
WBC # BLD: 9.8 K/CU MM (ref 4–10.5)

## 2023-12-25 PROCEDURE — 72125 CT NECK SPINE W/O DYE: CPT

## 2023-12-25 PROCEDURE — 99284 EMERGENCY DEPT VISIT MOD MDM: CPT

## 2023-12-25 PROCEDURE — 96372 THER/PROPH/DIAG INJ SC/IM: CPT

## 2023-12-25 PROCEDURE — 70450 CT HEAD/BRAIN W/O DYE: CPT

## 2023-12-25 PROCEDURE — 71250 CT THORAX DX C-: CPT

## 2023-12-25 PROCEDURE — 80053 COMPREHEN METABOLIC PANEL: CPT

## 2023-12-25 PROCEDURE — 6360000002 HC RX W HCPCS: Performed by: EMERGENCY MEDICINE

## 2023-12-25 PROCEDURE — 85025 COMPLETE CBC W/AUTO DIFF WBC: CPT

## 2023-12-25 RX ORDER — FENTANYL CITRATE 50 UG/ML
25 INJECTION, SOLUTION INTRAMUSCULAR; INTRAVENOUS ONCE
Status: DISCONTINUED | OUTPATIENT
Start: 2023-12-25 | End: 2023-12-25

## 2023-12-25 RX ORDER — 0.9 % SODIUM CHLORIDE 0.9 %
1000 INTRAVENOUS SOLUTION INTRAVENOUS ONCE
Status: DISCONTINUED | OUTPATIENT
Start: 2023-12-25 | End: 2023-12-25

## 2023-12-25 RX ORDER — MORPHINE SULFATE 4 MG/ML
4 INJECTION, SOLUTION INTRAMUSCULAR; INTRAVENOUS ONCE
Status: COMPLETED | OUTPATIENT
Start: 2023-12-25 | End: 2023-12-25

## 2023-12-25 RX ADMIN — MORPHINE SULFATE 4 MG: 4 INJECTION, SOLUTION INTRAMUSCULAR; INTRAVENOUS at 13:32
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
## 2023-12-25 NOTE — ED NOTES
5 attempts at IV starts done unsuccessfully by multiple RNs. IV attempted under ultrasound unsuccessful as well.

## 2023-12-25 NOTE — ED PROVIDER NOTES
I signed into this patient at the request of Dr. Vicki Martins in order to address facial abrasions and assess for any repair of potential lacerations. UNfortunately the patient elected to elope from the department. Ultimately I did not evaluate this patient.      Maryanna Runner, KWAME - CNP  12/25/23 1927
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
## 2023-12-26 NOTE — ED NOTES
Went to patient room to have patient sign AMA form. Patient no longer in room. Unable to get patient to sign AMA form.

## 2024-08-16 ENCOUNTER — HOSPITAL ENCOUNTER (EMERGENCY)
Age: 34
Discharge: LEFT BEFORE DISCHARGE | End: 2024-08-16

## 2024-08-16 VITALS
RESPIRATION RATE: 19 BRPM | TEMPERATURE: 97.6 F | OXYGEN SATURATION: 100 % | SYSTOLIC BLOOD PRESSURE: 136 MMHG | HEART RATE: 61 BPM | DIASTOLIC BLOOD PRESSURE: 89 MMHG